# Patient Record
Sex: FEMALE | Race: WHITE | NOT HISPANIC OR LATINO | Employment: OTHER | URBAN - METROPOLITAN AREA
[De-identification: names, ages, dates, MRNs, and addresses within clinical notes are randomized per-mention and may not be internally consistent; named-entity substitution may affect disease eponyms.]

---

## 2017-09-06 ENCOUNTER — LAB (OUTPATIENT)
Dept: LAB | Facility: HOSPITAL | Age: 67
End: 2017-09-06

## 2017-09-06 ENCOUNTER — OFFICE VISIT (OUTPATIENT)
Dept: ORTHOPEDIC SURGERY | Facility: CLINIC | Age: 67
End: 2017-09-06

## 2017-09-06 VITALS — BODY MASS INDEX: 34.93 KG/M2 | WEIGHT: 244 LBS | HEIGHT: 70 IN | TEMPERATURE: 97.8 F

## 2017-09-06 DIAGNOSIS — M25.471 PAIN AND SWELLING OF ANKLE, RIGHT: ICD-10-CM

## 2017-09-06 DIAGNOSIS — T81.31XA WOUND DEHISCENCE, INITIAL ENCOUNTER: ICD-10-CM

## 2017-09-06 DIAGNOSIS — M25.571 PAIN AND SWELLING OF ANKLE, RIGHT: ICD-10-CM

## 2017-09-06 DIAGNOSIS — M25.471 PAIN AND SWELLING OF ANKLE, RIGHT: Primary | ICD-10-CM

## 2017-09-06 DIAGNOSIS — M19.079 ARTHRITIS OF ANKLE: ICD-10-CM

## 2017-09-06 DIAGNOSIS — M25.571 PAIN AND SWELLING OF ANKLE, RIGHT: Primary | ICD-10-CM

## 2017-09-06 PROBLEM — T81.30XA WOUND DEHISCENCE: Status: ACTIVE | Noted: 2017-09-06

## 2017-09-06 LAB
BASOPHILS # BLD AUTO: 0.03 10*3/MM3 (ref 0–0.2)
BASOPHILS NFR BLD AUTO: 0.4 % (ref 0–1.5)
CRP SERPL-MCNC: 0.81 MG/DL (ref 0–0.5)
DEPRECATED RDW RBC AUTO: 49.8 FL (ref 37–54)
EOSINOPHIL # BLD AUTO: 0.28 10*3/MM3 (ref 0–0.7)
EOSINOPHIL NFR BLD AUTO: 4 % (ref 0.3–6.2)
ERYTHROCYTE [DISTWIDTH] IN BLOOD BY AUTOMATED COUNT: 13.9 % (ref 11.7–13)
ERYTHROCYTE [SEDIMENTATION RATE] IN BLOOD: 20 MM/HR (ref 0–30)
HCT VFR BLD AUTO: 38.2 % (ref 35.6–45.5)
HGB BLD-MCNC: 12.2 G/DL (ref 11.9–15.5)
IMM GRANULOCYTES # BLD: 0.02 10*3/MM3 (ref 0–0.03)
IMM GRANULOCYTES NFR BLD: 0.3 % (ref 0–0.5)
LYMPHOCYTES # BLD AUTO: 1.18 10*3/MM3 (ref 0.9–4.8)
LYMPHOCYTES NFR BLD AUTO: 16.9 % (ref 19.6–45.3)
MCH RBC QN AUTO: 31 PG (ref 26.9–32)
MCHC RBC AUTO-ENTMCNC: 31.9 G/DL (ref 32.4–36.3)
MCV RBC AUTO: 97.2 FL (ref 80.5–98.2)
MONOCYTES # BLD AUTO: 0.87 10*3/MM3 (ref 0.2–1.2)
MONOCYTES NFR BLD AUTO: 12.5 % (ref 5–12)
NEUTROPHILS # BLD AUTO: 4.59 10*3/MM3 (ref 1.9–8.1)
NEUTROPHILS NFR BLD AUTO: 65.9 % (ref 42.7–76)
PLATELET # BLD AUTO: 282 10*3/MM3 (ref 140–500)
PMV BLD AUTO: 10.4 FL (ref 6–12)
RBC # BLD AUTO: 3.93 10*6/MM3 (ref 3.9–5.2)
WBC NRBC COR # BLD: 6.97 10*3/MM3 (ref 4.5–10.7)

## 2017-09-06 PROCEDURE — 86140 C-REACTIVE PROTEIN: CPT

## 2017-09-06 PROCEDURE — 73610 X-RAY EXAM OF ANKLE: CPT | Performed by: ORTHOPAEDIC SURGERY

## 2017-09-06 PROCEDURE — 85652 RBC SED RATE AUTOMATED: CPT

## 2017-09-06 PROCEDURE — 36415 COLL VENOUS BLD VENIPUNCTURE: CPT

## 2017-09-06 PROCEDURE — 99204 OFFICE O/P NEW MOD 45 MIN: CPT | Performed by: ORTHOPAEDIC SURGERY

## 2017-09-06 PROCEDURE — 85025 COMPLETE CBC W/AUTO DIFF WBC: CPT

## 2017-09-06 RX ORDER — POLYMYXIN B SULFATE AND TRIMETHOPRIM 1; 10000 MG/ML; [USP'U]/ML
SOLUTION OPHTHALMIC
COMMUNITY
Start: 2017-08-02 | End: 2022-07-11

## 2017-09-06 RX ORDER — METOPROLOL SUCCINATE 25 MG/1
TABLET, EXTENDED RELEASE ORAL
COMMUNITY
Start: 2017-06-30 | End: 2022-07-11

## 2017-09-06 RX ORDER — ESTRADIOL 1 MG/1
1 TABLET ORAL DAILY
COMMUNITY
Start: 2017-06-30

## 2017-09-06 RX ORDER — HYDROCODONE BITARTRATE AND ACETAMINOPHEN 7.5; 325 MG/1; MG/1
1 TABLET ORAL EVERY 6 HOURS PRN
COMMUNITY
Start: 2017-08-07 | End: 2022-08-22 | Stop reason: HOSPADM

## 2017-09-06 RX ORDER — SOTALOL HYDROCHLORIDE 120 MG/1
TABLET ORAL
COMMUNITY
Start: 2017-06-30 | End: 2022-07-11

## 2017-09-07 PROBLEM — M25.473 PAIN AND SWELLING OF ANKLE: Status: ACTIVE | Noted: 2017-09-07

## 2017-09-07 PROBLEM — M25.579 PAIN AND SWELLING OF ANKLE: Status: ACTIVE | Noted: 2017-09-07

## 2017-09-07 NOTE — PROGRESS NOTES
New Patient Complaint      Patient: Fe Arevalo  YOB: 1950 67 y.o. female  Medical Record Number: [id number]    Chief Complaints: My ankle hurts    History of Present Illness:     Patient is had a 5 week history of moderate to severe stabbing aching burning constant pain anterior aspect of her right ankle with swelling worse with standing sitting driving and walking and improved some with ice and rest.    This began about 5 weeks ago when she was doing a lot of camping and had some swelling to her ankle is also stepping awkwardly a lot up onto a step dorsiflexing her ankle.  She subsequently developed some right down of the scar over the anterior aspect of her right ankle.  Active that she had some mild intermittent aching pain associated with arthritis to the ankle.  She's not had any systemic illness or other wounds and has not had any fevers or chills redness or drainage to this area other than some very slight clear drops on a bandage.     She states that the ankle swells throughout the day but swelling essentially resolves overnight with elevation she's been using some very light weight compression hose.    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    HPI    Allergies: Allergies not on file    Medications:   No current outpatient prescriptions on file prior to visit.     No current facility-administered medications on file prior to visit.        Past Medical History:   Diagnosis Date   • A-fib    • Hypertension    • Joint pain    • Joint swelling    • Osteomyelitis      Past Surgical History:   Procedure Laterality Date   • FOOT SURGERY      x7   • HYSTERECTOMY  2004     Social History     Occupational History   • Not on file.     Social History Main Topics   • Smoking status: Never Smoker   • Smokeless tobacco: Not on file   • Alcohol use No   • Drug use: Not on file   •  "Sexual activity: Not on file      Social History     Social History Narrative   • No narrative on file     Family History   Problem Relation Age of Onset   • Heart disease Mother    • Lung disease Father    • Emphysema Father        Review of Systems: 14 point review of systems performed, positive pertinent findings identified in HPI. All remaining systems negative     Review of Systems      Physical Exam:   Vitals:    09/06/17 1042   Temp: 97.8 °F (36.6 °C)   Weight: 244 lb (111 kg)   Height: 70\" (177.8 cm)     Physical Exam   Constitutional: pleasant, well developed   Eyes: sclera non icteric  Hearing : adequate for exam  Cardiovascular: palpable pulses in right foot, right calf/ thigh NT without sign of DVT  Respiratoy: breathing unlabored   Neurological: grossly sensate to LT throughout right LE  Psychiatric: oriented with normal mood and affect.   Lymphatic: No palpable popliteal lymphadenopathy right LE  Skin: intact throughout right leg/foot  Musculoskeletal: Nonantalgic gait.  Right ankle shows only mild swelling to the area of her central scar anteriorly has about 1 mm area of breakdown in width over about a 15 mm length area and is very superficial and only minimally tender to palpation there is some slight induration around this but no erythema or evidence of cellulitis.  She has 5° of dorsiflexion beyond neutral and 35-40° of plantarflexion without significant discomfort or irritability to the ankle.  She is nontender over the fibula  Physical Exam  Ortho Exam    Radiology: 3 views the right ankle ordered to evaluate pain reviewed and compared with x-rays from 2012.  There is mild to moderate arthritic changes some spurring over the anterior aspect of the ankle.  I don't see any change in the fibula as far as lucencies.  There is some plantar calcaneal spurring and calcification at the insertion of the Achilles as well as some arthritic changes dorsum of the naviculocuneiform and talonavicular joints " that is unchanged    Assessment/Plan: 1.  Right ankle swelling with anterior wound breakdown.    Had a long discussion with her today that at this point is been going on for about 5 weeks and I don't see any clinical evidence of septic arthritis and I would think if this were in fact the case that would've gotten significantly and progressively worse which it has not.  I think it is due to some local swelling from activity and continued wound irritation.    I went ahead and checked some baseline labs on her CBC with differential, sedimentation rate, C-reactive protein.    She was fitted with medium weight compression hose today for daytime use and we'll pain this area twice daily with Betadine apply small bandage to it.  I've also given her the number and placed referral to [facility].    We will check her back in 2 weeks no x-rays are necessary unless she's had increased pain.  If anything worsens in the interim she'll let me know

## 2017-09-13 ENCOUNTER — APPOINTMENT (OUTPATIENT)
Dept: WOUND CARE | Facility: HOSPITAL | Age: 67
End: 2017-09-13
Attending: SURGERY

## 2022-07-11 ENCOUNTER — PRE-ADMISSION TESTING (OUTPATIENT)
Dept: PREADMISSION TESTING | Facility: HOSPITAL | Age: 72
End: 2022-07-11

## 2022-07-11 ENCOUNTER — HOSPITAL ENCOUNTER (OUTPATIENT)
Dept: GENERAL RADIOLOGY | Facility: HOSPITAL | Age: 72
Discharge: HOME | End: 2022-07-11

## 2022-07-11 VITALS
WEIGHT: 255.1 LBS | HEART RATE: 73 BPM | OXYGEN SATURATION: 100 % | BODY MASS INDEX: 37.78 KG/M2 | SYSTOLIC BLOOD PRESSURE: 144 MMHG | HEIGHT: 69 IN | TEMPERATURE: 97.7 F | DIASTOLIC BLOOD PRESSURE: 73 MMHG

## 2022-07-11 LAB
ALBUMIN SERPL-MCNC: 4 G/DL (ref 3.5–5.2)
ALBUMIN/GLOB SERPL: 1.3 G/DL
ALP SERPL-CCNC: 61 U/L (ref 39–117)
ALT SERPL W P-5'-P-CCNC: 12 U/L (ref 1–33)
ANION GAP SERPL CALCULATED.3IONS-SCNC: 7 MMOL/L (ref 5–15)
APTT PPP: 38.8 SECONDS (ref 22.7–35.4)
AST SERPL-CCNC: 12 U/L (ref 1–32)
BACTERIA UR QL AUTO: ABNORMAL /HPF
BASOPHILS # BLD AUTO: 0.04 10*3/MM3 (ref 0–0.2)
BASOPHILS NFR BLD AUTO: 0.5 % (ref 0–1.5)
BILIRUB SERPL-MCNC: 0.3 MG/DL (ref 0–1.2)
BILIRUB UR QL STRIP: NEGATIVE
BUN SERPL-MCNC: 38 MG/DL (ref 8–23)
BUN/CREAT SERPL: 33.3 (ref 7–25)
CALCIUM SPEC-SCNC: 10.5 MG/DL (ref 8.6–10.5)
CHLORIDE SERPL-SCNC: 102 MMOL/L (ref 98–107)
CLARITY UR: ABNORMAL
CO2 SERPL-SCNC: 27 MMOL/L (ref 22–29)
COLOR UR: YELLOW
CREAT SERPL-MCNC: 1.14 MG/DL (ref 0.57–1)
DEPRECATED RDW RBC AUTO: 42.2 FL (ref 37–54)
EGFRCR SERPLBLD CKD-EPI 2021: 51.9 ML/MIN/1.73
EOSINOPHIL # BLD AUTO: 0.47 10*3/MM3 (ref 0–0.4)
EOSINOPHIL NFR BLD AUTO: 6.2 % (ref 0.3–6.2)
ERYTHROCYTE [DISTWIDTH] IN BLOOD BY AUTOMATED COUNT: 12.1 % (ref 12.3–15.4)
GLOBULIN UR ELPH-MCNC: 3.1 GM/DL
GLUCOSE SERPL-MCNC: 81 MG/DL (ref 65–99)
GLUCOSE UR STRIP-MCNC: NEGATIVE MG/DL
HBA1C MFR BLD: 5.4 % (ref 4.8–5.6)
HCT VFR BLD AUTO: 33.9 % (ref 34–46.6)
HGB BLD-MCNC: 10.8 G/DL (ref 12–15.9)
HGB UR QL STRIP.AUTO: NEGATIVE
HYALINE CASTS UR QL AUTO: ABNORMAL /LPF
IMM GRANULOCYTES # BLD AUTO: 0.04 10*3/MM3 (ref 0–0.05)
IMM GRANULOCYTES NFR BLD AUTO: 0.5 % (ref 0–0.5)
INR PPP: 2.33 (ref 0.9–1.1)
KETONES UR QL STRIP: NEGATIVE
LEUKOCYTE ESTERASE UR QL STRIP.AUTO: ABNORMAL
LYMPHOCYTES # BLD AUTO: 0.83 10*3/MM3 (ref 0.7–3.1)
LYMPHOCYTES NFR BLD AUTO: 10.9 % (ref 19.6–45.3)
MCH RBC QN AUTO: 30.5 PG (ref 26.6–33)
MCHC RBC AUTO-ENTMCNC: 31.9 G/DL (ref 31.5–35.7)
MCV RBC AUTO: 95.8 FL (ref 79–97)
MONOCYTES # BLD AUTO: 0.75 10*3/MM3 (ref 0.1–0.9)
MONOCYTES NFR BLD AUTO: 9.8 % (ref 5–12)
NEUTROPHILS NFR BLD AUTO: 5.49 10*3/MM3 (ref 1.7–7)
NEUTROPHILS NFR BLD AUTO: 72.1 % (ref 42.7–76)
NITRITE UR QL STRIP: NEGATIVE
NRBC BLD AUTO-RTO: 0 /100 WBC (ref 0–0.2)
PH UR STRIP.AUTO: <=5 [PH] (ref 5–8)
PLATELET # BLD AUTO: 250 10*3/MM3 (ref 140–450)
PMV BLD AUTO: 10.2 FL (ref 6–12)
POTASSIUM SERPL-SCNC: 4.3 MMOL/L (ref 3.5–5.2)
PROT SERPL-MCNC: 7.1 G/DL (ref 6–8.5)
PROT UR QL STRIP: NEGATIVE
PROTHROMBIN TIME: 25 SECONDS (ref 11.7–14.2)
RBC # BLD AUTO: 3.54 10*6/MM3 (ref 3.77–5.28)
RBC # UR STRIP: ABNORMAL /HPF
REF LAB TEST METHOD: ABNORMAL
SODIUM SERPL-SCNC: 136 MMOL/L (ref 136–145)
SP GR UR STRIP: 1.01 (ref 1–1.03)
SQUAMOUS #/AREA URNS HPF: ABNORMAL /HPF
UROBILINOGEN UR QL STRIP: ABNORMAL
WBC # UR STRIP: ABNORMAL /HPF
WBC NRBC COR # BLD: 7.62 10*3/MM3 (ref 3.4–10.8)

## 2022-07-11 PROCEDURE — 73560 X-RAY EXAM OF KNEE 1 OR 2: CPT

## 2022-07-11 PROCEDURE — 83036 HEMOGLOBIN GLYCOSYLATED A1C: CPT

## 2022-07-11 PROCEDURE — 85730 THROMBOPLASTIN TIME PARTIAL: CPT

## 2022-07-11 PROCEDURE — 80053 COMPREHEN METABOLIC PANEL: CPT

## 2022-07-11 PROCEDURE — 85610 PROTHROMBIN TIME: CPT

## 2022-07-11 PROCEDURE — 85025 COMPLETE CBC W/AUTO DIFF WBC: CPT

## 2022-07-11 PROCEDURE — 81001 URINALYSIS AUTO W/SCOPE: CPT

## 2022-07-11 PROCEDURE — 36415 COLL VENOUS BLD VENIPUNCTURE: CPT

## 2022-07-11 RX ORDER — HYDRALAZINE HYDROCHLORIDE 25 MG/1
25 TABLET, FILM COATED ORAL 2 TIMES DAILY
COMMUNITY

## 2022-07-11 RX ORDER — MECLIZINE HCL 25MG 25 MG/1
25 TABLET, CHEWABLE ORAL DAILY
COMMUNITY
End: 2022-07-11

## 2022-07-11 RX ORDER — WARFARIN SODIUM 5 MG/1
7.5 TABLET ORAL
COMMUNITY

## 2022-07-11 RX ORDER — METOPROLOL TARTRATE 50 MG/1
50 TABLET, FILM COATED ORAL 2 TIMES DAILY
COMMUNITY

## 2022-07-11 RX ORDER — MECLIZINE HYDROCHLORIDE 25 MG/1
25 TABLET ORAL DAILY
COMMUNITY

## 2022-07-11 RX ORDER — MULTIPLE VITAMINS W/ MINERALS TAB 9MG-400MCG
1 TAB ORAL DAILY
COMMUNITY

## 2022-07-11 RX ORDER — ACETAMINOPHEN 160 MG
2000 TABLET,DISINTEGRATING ORAL DAILY
COMMUNITY

## 2022-07-11 RX ORDER — WARFARIN SODIUM 5 MG/1
5 TABLET ORAL
COMMUNITY

## 2022-07-11 RX ORDER — LISINOPRIL AND HYDROCHLOROTHIAZIDE 20; 12.5 MG/1; MG/1
1 TABLET ORAL DAILY
Status: ON HOLD | COMMUNITY
End: 2022-09-16

## 2022-07-11 ASSESSMENT — KOOS JR
KOOS JR SCORE: 22
KOOS JR SCORE: 31.307

## 2022-07-11 NOTE — DISCHARGE INSTRUCTIONS
Take the following medications the morning of surgery:  MECLIZINE, HYDRALAZINE, METORPOLOL      ARRIVE TO OUTPATIENT SURGERY CENTER 7/15/22 AT 5:15AM    If you are on prescription narcotic pain medication to control your pain you may also take that medication the morning of surgery.    General Instructions:  Do not eat solid food after midnight the night before surgery.  You may drink clear liquids day of surgery but must stop at least one hour before your hospital arrival time.  It is beneficial for you to have a clear drink that contains carbohydrates the day of surgery.  We suggest a 12 to 20 ounce bottle of Gatorade or Powerade for non-diabetic patients or a 12 to 20 ounce bottle of G2 or Powerade Zero for diabetic patients. (Pediatric patients, are not advised to drink a 12 to 20 ounce carbohydrate drink)    Clear liquids are liquids you can see through.  Nothing red in color.     Plain water                               Sports drinks  Sodas                                   Gelatin (Jell-O)  Fruit juices without pulp such as white grape juice and apple juice  Popsicles that contain no fruit or yogurt  Tea or coffee (no cream or milk added)  Gatorade / Powerade  G2 / Powerade Zero    Infants may have breast milk up to four hours before surgery.  Infants drinking formula may drink formula up to six hours before surgery.   Patients who avoid smoking, chewing tobacco and alcohol for 4 weeks prior to surgery have a reduced risk of post-operative complications.  Quit smoking as many days before surgery as you can.  Do not smoke, use chewing tobacco or drink alcohol the day of surgery.   If applicable bring your C-PAP/ BI-PAP machine.  Bring any papers given to you in the doctor’s office.  Wear clean comfortable clothes.  Do not wear contact lenses, false eyelashes or make-up.  Bring a case for your glasses.   Bring crutches or walker if applicable.  Remove all piercings.  Leave jewelry and any other valuables at  home.  Hair extensions with metal clips must be removed prior to surgery.  The Pre-Admission Testing nurse will instruct you to bring medications if unable to obtain an accurate list in Pre-Admission Testing.      Preventing a Surgical Site Infection:  For 2 to 3 days before surgery, avoid shaving with a razor because the razor can irritate skin and make it easier to develop an infection.    Any areas of open skin can increase the risk of a post-operative wound infection by allowing bacteria to enter and travel throughout the body.  Notify your surgeon if you have any skin wounds / rashes even if it is not near the expected surgical site.  The area will need assessed to determine if surgery should be delayed until it is healed.  The night prior to surgery shower using a fresh bar of anti-bacterial soap (such as Dial) and clean washcloth.  Sleep in a clean bed with clean clothing.  Do not allow pets to sleep with you.  Shower on the morning of surgery using a fresh bar of anti-bacterial soap (such as Dial) and clean washcloth.  Dry with a clean towel and dress in clean clothing.  Ask your surgeon if you will be receiving antibiotics prior to surgery.  Make sure you, your family, and all healthcare providers clean their hands with soap and water or an alcohol based hand  before caring for you or your wound.    Day of surgery:  Your arrival time is approximately two hours before your scheduled surgery time.  Upon arrival, a Pre-op nurse and Anesthesiologist will review your health history, obtain vital signs, and answer questions you may have.  The only belongings needed at this time will be a list of your home medications and if applicable your C-PAP/BI-PAP machine.  A Pre-op nurse will start an IV and you may receive medication in preparation for surgery, including something to help you relax.     Please be aware that surgery does come with discomfort.  We want to make every effort to control your discomfort  so please discuss any uncontrolled symptoms with your nurse.   Your doctor will most likely have prescribed pain medications.      If you are going home after surgery you will receive individualized written care instructions before being discharged.  A responsible adult must drive you to and from the hospital on the day of your surgery and stay with you for 24 hours.  Discharge prescriptions can be filled by the hospital pharmacy during regular pharmacy hours.  If you are having surgery late in the day/evening your prescription may be e-prescribed to your pharmacy.  Please verify your pharmacy hours or chose a 24 hour pharmacy to avoid not having access to your prescription because your pharmacy has closed for the day.    If you are staying overnight following surgery, you will be transported to your hospital room following the recovery period.  [facility] has all private rooms.    If you have any questions please call Pre-Admission Testing at [phone].  Deductibles and co-payments are collected on the day of service. Please be prepared to pay the required co-pay, deductible or deposit on the day of service as defined by your plan.    Patient Education for Self-Quarantine Process    Following your COVID testing, we strongly recommend that you wear a mask when you are with other people and practice social distancing.   Limit your activities to only required outings.  Wash your hands with soap and water frequently for at least 20 seconds.   Avoid touching your eyes, nose and mouth with unwashed hands.  Do not share anything - utensils, drinking glasses, food from the same bowl.   Sanitize household surfaces daily. Include all high touch areas (door handles, light switches, phones, countertops, etc.)    Call your surgeon immediately if you experience any of the following symptoms:  Sore Throat  Shortness of Breath or difficulty breathing  Cough  Chills  Body soreness or muscle  pain  Headache  Fever  New loss of taste or smell  Do not arrive for your surgery ill.  Your procedure will need to be rescheduled to another time.  You will need to call your physician before the day of surgery to avoid any unnecessary exposure to hospital staff as well as other patients.       CHLORHEXIDINE CLOTH INSTRUCTIONS  The morning of surgery follow these instructions using the Chlorhexidine cloths you've been given.  These steps reduce bacteria on the body.  Do not use the cloths near your eyes, ears mouth, genitalia or on open wounds.  Throw the cloths away after use but do not try to flush them down a toilet.      Open and remove one cloth at a time from the package.    Leave the cloth unfolded and begin the bathing.  Massage the skin with the cloths using gentle pressure to remove bacteria.  Do not scrub harshly.   Follow the steps below with one 2% CHG cloth per area (6 total cloths).  One cloth for neck, shoulders and chest.  One cloth for both arms, hands, fingers and underarms (do underarms last).  One cloth for the abdomen followed by groin.  One cloth for right leg and foot including between the toes.  One cloth for left leg and foot including between the toes.  The last cloth is to be used for the back of the neck, back and buttocks.    Allow the CHG to air dry 3 minutes on the skin which will give it time to work and decrease the chance of irritation.  The skin may feel sticky until it is dry.  Do not rinse with water or any other liquid or you will lose the beneficial effects of the CHG.  If mild skin irritation occurs, do rinse the skin to remove the CHG.  Report this to the nurse at time of admission.  Do not apply lotions, creams, ointments, deodorants or perfumes after using the clothes. Dress in clean clothes before coming to the hospital.    BACTROBAN NASAL OINTMENT  There are many germs normally in your nose. Bactroban is an ointment that will help reduce these germs. Please follow these  instructions for Bactroban use:      ____The day before surgery in the morning  Date________    ____The day before surgery in the evening              Date________    ____The day of surgery in the morning    Date________    **Squirt ½ package of Bactroban Ointment onto a cotton applicator and apply to inside of 1st nostril.  Squirt the remaining Bactroban and apply to the inside of the other nostril.

## 2022-07-13 ENCOUNTER — LAB (OUTPATIENT)
Dept: LAB | Facility: HOSPITAL | Age: 72
End: 2022-07-13

## 2022-07-13 LAB — SARS-COV-2 ORF1AB RESP QL NAA+PROBE: NOT DETECTED

## 2022-07-13 PROCEDURE — U0005 INFEC AGEN DETEC AMPLI PROBE: HCPCS

## 2022-07-13 PROCEDURE — C9803 HOPD COVID-19 SPEC COLLECT: HCPCS

## 2022-07-13 PROCEDURE — U0004 COV-19 TEST NON-CDC HGH THRU: HCPCS

## 2022-07-15 ENCOUNTER — ANESTHESIA (OUTPATIENT)
Dept: PERIOP | Facility: HOSPITAL | Age: 72
End: 2022-07-15

## 2022-07-15 ENCOUNTER — APPOINTMENT (OUTPATIENT)
Dept: GENERAL RADIOLOGY | Facility: HOSPITAL | Age: 72
End: 2022-07-15

## 2022-07-15 ENCOUNTER — HOSPITAL ENCOUNTER (OUTPATIENT)
Facility: HOSPITAL | Age: 72
Discharge: HOME | End: 2022-07-16
Attending: ORTHOPAEDIC SURGERY | Admitting: ORTHOPAEDIC SURGERY

## 2022-07-15 ENCOUNTER — ANESTHESIA EVENT (OUTPATIENT)
Dept: PERIOP | Facility: HOSPITAL | Age: 72
End: 2022-07-15

## 2022-07-15 PROBLEM — Z96.659 STATUS POST KNEE REPLACEMENT: Status: ACTIVE | Noted: 2022-07-15

## 2022-07-15 LAB
ANION GAP SERPL CALCULATED.3IONS-SCNC: 12 MMOL/L (ref 5–15)
BUN SERPL-MCNC: 37 MG/DL (ref 8–23)
BUN/CREAT SERPL: 22 (ref 7–25)
CALCIUM SPEC-SCNC: 9.7 MG/DL (ref 8.6–10.5)
CHLORIDE SERPL-SCNC: 102 MMOL/L (ref 98–107)
CO2 SERPL-SCNC: 18 MMOL/L (ref 22–29)
CREAT SERPL-MCNC: 1.68 MG/DL (ref 0.57–1)
DEPRECATED RDW RBC AUTO: 42 FL (ref 37–54)
EGFRCR SERPLBLD CKD-EPI 2021: 32.6 ML/MIN/1.73
ERYTHROCYTE [DISTWIDTH] IN BLOOD BY AUTOMATED COUNT: 12.1 % (ref 12.3–15.4)
GLUCOSE BLDC GLUCOMTR-MCNC: 163 MG/DL (ref 70–130)
GLUCOSE SERPL-MCNC: 182 MG/DL (ref 65–99)
HCT VFR BLD AUTO: 32.6 % (ref 34–46.6)
HGB BLD-MCNC: 10.4 G/DL (ref 12–15.9)
INR PPP: 1.18 (ref 0.9–1.1)
INR PPP: 1.23 (ref 0.9–1.1)
MCH RBC QN AUTO: 30.7 PG (ref 26.6–33)
MCHC RBC AUTO-ENTMCNC: 31.9 G/DL (ref 31.5–35.7)
MCV RBC AUTO: 96.2 FL (ref 79–97)
PLATELET # BLD AUTO: 211 10*3/MM3 (ref 140–450)
PMV BLD AUTO: 10.6 FL (ref 6–12)
POTASSIUM SERPL-SCNC: 5.2 MMOL/L (ref 3.5–5.2)
PROTHROMBIN TIME: 14.8 SECONDS (ref 11.7–14.2)
PROTHROMBIN TIME: 15.3 SECONDS (ref 11.7–14.2)
RBC # BLD AUTO: 3.39 10*6/MM3 (ref 3.77–5.28)
SODIUM SERPL-SCNC: 132 MMOL/L (ref 136–145)
WBC NRBC COR # BLD: 10.3 10*3/MM3 (ref 3.4–10.8)

## 2022-07-15 PROCEDURE — 97161 PT EVAL LOW COMPLEX 20 MIN: CPT

## 2022-07-15 PROCEDURE — C1776 JOINT DEVICE (IMPLANTABLE): HCPCS | Performed by: ORTHOPAEDIC SURGERY

## 2022-07-15 PROCEDURE — 80048 BASIC METABOLIC PNL TOTAL CA: CPT | Performed by: ORTHOPAEDIC SURGERY

## 2022-07-15 PROCEDURE — 25010000002 HYDROMORPHONE PER 4 MG: Performed by: NURSE ANESTHETIST, CERTIFIED REGISTERED

## 2022-07-15 PROCEDURE — 85027 COMPLETE CBC AUTOMATED: CPT | Performed by: ORTHOPAEDIC SURGERY

## 2022-07-15 PROCEDURE — 25010000002 FENTANYL CITRATE (PF) 50 MCG/ML SOLUTION: Performed by: NURSE ANESTHETIST, CERTIFIED REGISTERED

## 2022-07-15 PROCEDURE — 25010000002 CEFAZOLIN IN DEXTROSE 2-4 GM/100ML-% SOLUTION: Performed by: ORTHOPAEDIC SURGERY

## 2022-07-15 PROCEDURE — 97116 GAIT TRAINING THERAPY: CPT

## 2022-07-15 PROCEDURE — 25010000002 ROPIVACAINE PER 1 MG: Performed by: ORTHOPAEDIC SURGERY

## 2022-07-15 PROCEDURE — 25010000002 NEOSTIGMINE 5 MG/10ML SOLUTION: Performed by: NURSE ANESTHETIST, CERTIFIED REGISTERED

## 2022-07-15 PROCEDURE — 25010000002 PROPOFOL 10 MG/ML EMULSION: Performed by: NURSE ANESTHETIST, CERTIFIED REGISTERED

## 2022-07-15 PROCEDURE — C1713 ANCHOR/SCREW BN/BN,TIS/BN: HCPCS | Performed by: ORTHOPAEDIC SURGERY

## 2022-07-15 PROCEDURE — G0378 HOSPITAL OBSERVATION PER HR: HCPCS

## 2022-07-15 PROCEDURE — 73560 X-RAY EXAM OF KNEE 1 OR 2: CPT

## 2022-07-15 PROCEDURE — 25010000002 EPINEPHRINE 1 MG/ML SOLUTION 30 ML VIAL: Performed by: ORTHOPAEDIC SURGERY

## 2022-07-15 PROCEDURE — 85610 PROTHROMBIN TIME: CPT | Performed by: ORTHOPAEDIC SURGERY

## 2022-07-15 PROCEDURE — 25010000002 CLONIDINE PER 1 MG: Performed by: ORTHOPAEDIC SURGERY

## 2022-07-15 PROCEDURE — 25010000002 CEFAZOLIN PER 500 MG: Performed by: ORTHOPAEDIC SURGERY

## 2022-07-15 PROCEDURE — 25010000002 KETOROLAC TROMETHAMINE PER 15 MG: Performed by: ORTHOPAEDIC SURGERY

## 2022-07-15 PROCEDURE — 82962 GLUCOSE BLOOD TEST: CPT

## 2022-07-15 PROCEDURE — 25010000002 DEXAMETHASONE PER 1 MG: Performed by: NURSE ANESTHETIST, CERTIFIED REGISTERED

## 2022-07-15 DEVICE — JOURNEY 7.5 ROUND RESURF PAT 32MM STANDARD
Type: IMPLANTABLE DEVICE | Site: KNEE | Status: FUNCTIONAL
Brand: JOURNEY

## 2022-07-15 DEVICE — DEV CONTRL TISS STRATAFIX SPIRAL MNCRYL UD 3/0 PLS 30CM: Type: IMPLANTABLE DEVICE | Site: KNEE | Status: FUNCTIONAL

## 2022-07-15 DEVICE — JOURNEY II BCS XLPE ARTICULAR                                    INSERT SIZE 3-4 RIGHT 11MM
Type: IMPLANTABLE DEVICE | Site: KNEE | Status: FUNCTIONAL
Brand: JOURNEY

## 2022-07-15 DEVICE — CMT BONE PALACOS R HI/VISC 1X40: Type: IMPLANTABLE DEVICE | Site: KNEE | Status: FUNCTIONAL

## 2022-07-15 DEVICE — IMPLANTABLE DEVICE: Type: IMPLANTABLE DEVICE | Site: KNEE | Status: FUNCTIONAL

## 2022-07-15 DEVICE — JOURNEY TIBIAL BASEPLATE NONPOROUS                                    RIGHT SIZE 4
Type: IMPLANTABLE DEVICE | Site: KNEE | Status: FUNCTIONAL
Brand: JOURNEY

## 2022-07-15 DEVICE — JOURNEY II BCS FEMORAL OXINIUM                                    RIGHT SIZE 6
Type: IMPLANTABLE DEVICE | Site: KNEE | Status: FUNCTIONAL
Brand: JOURNEY

## 2022-07-15 RX ORDER — OXYCODONE AND ACETAMINOPHEN 7.5; 325 MG/1; MG/1
1 TABLET ORAL EVERY 4 HOURS PRN
Status: DISCONTINUED | OUTPATIENT
Start: 2022-07-15 | End: 2022-07-15 | Stop reason: HOSPADM

## 2022-07-15 RX ORDER — ONDANSETRON 4 MG/1
4 TABLET, FILM COATED ORAL EVERY 6 HOURS PRN
Status: DISCONTINUED | OUTPATIENT
Start: 2022-07-15 | End: 2022-07-16 | Stop reason: HOSPADM

## 2022-07-15 RX ORDER — GLYCOPYRROLATE 0.2 MG/ML
INJECTION INTRAMUSCULAR; INTRAVENOUS AS NEEDED
Status: DISCONTINUED | OUTPATIENT
Start: 2022-07-15 | End: 2022-07-15 | Stop reason: SURG

## 2022-07-15 RX ORDER — CEFAZOLIN SODIUM 2 G/100ML
2 INJECTION, SOLUTION INTRAVENOUS EVERY 8 HOURS
Status: COMPLETED | OUTPATIENT
Start: 2022-07-15 | End: 2022-07-15

## 2022-07-15 RX ORDER — HYDROCODONE BITARTRATE AND ACETAMINOPHEN 7.5; 325 MG/1; MG/1
1 TABLET ORAL ONCE AS NEEDED
Status: COMPLETED | OUTPATIENT
Start: 2022-07-15 | End: 2022-07-15

## 2022-07-15 RX ORDER — DOCUSATE SODIUM 100 MG/1
100 CAPSULE, LIQUID FILLED ORAL 2 TIMES DAILY PRN
Status: DISCONTINUED | OUTPATIENT
Start: 2022-07-15 | End: 2022-07-16 | Stop reason: HOSPADM

## 2022-07-15 RX ORDER — WARFARIN SODIUM 5 MG/1
5 TABLET ORAL
Status: DISCONTINUED | OUTPATIENT
Start: 2022-07-17 | End: 2022-07-16 | Stop reason: HOSPADM

## 2022-07-15 RX ORDER — FLUMAZENIL 0.1 MG/ML
0.2 INJECTION INTRAVENOUS AS NEEDED
Status: DISCONTINUED | OUTPATIENT
Start: 2022-07-15 | End: 2022-07-15 | Stop reason: HOSPADM

## 2022-07-15 RX ORDER — TRANEXAMIC ACID 100 MG/ML
INJECTION, SOLUTION INTRAVENOUS AS NEEDED
Status: DISCONTINUED | OUTPATIENT
Start: 2022-07-15 | End: 2022-07-15 | Stop reason: SURG

## 2022-07-15 RX ORDER — PROPOFOL 10 MG/ML
VIAL (ML) INTRAVENOUS AS NEEDED
Status: DISCONTINUED | OUTPATIENT
Start: 2022-07-15 | End: 2022-07-15 | Stop reason: SURG

## 2022-07-15 RX ORDER — CEFAZOLIN SODIUM 2 G/100ML
2 INJECTION, SOLUTION INTRAVENOUS ONCE
Status: COMPLETED | OUTPATIENT
Start: 2022-07-15 | End: 2022-07-15

## 2022-07-15 RX ORDER — ENOXAPARIN SODIUM 100 MG/ML
30 INJECTION SUBCUTANEOUS EVERY 24 HOURS
Status: DISCONTINUED | OUTPATIENT
Start: 2022-07-16 | End: 2022-07-16 | Stop reason: HOSPADM

## 2022-07-15 RX ORDER — UREA 10 %
1 LOTION (ML) TOPICAL NIGHTLY PRN
Status: DISCONTINUED | OUTPATIENT
Start: 2022-07-15 | End: 2022-07-16 | Stop reason: HOSPADM

## 2022-07-15 RX ORDER — FENTANYL CITRATE 50 UG/ML
INJECTION, SOLUTION INTRAMUSCULAR; INTRAVENOUS AS NEEDED
Status: DISCONTINUED | OUTPATIENT
Start: 2022-07-15 | End: 2022-07-15 | Stop reason: SURG

## 2022-07-15 RX ORDER — PROMETHAZINE HYDROCHLORIDE 25 MG/1
25 SUPPOSITORY RECTAL ONCE AS NEEDED
Status: DISCONTINUED | OUTPATIENT
Start: 2022-07-15 | End: 2022-07-15 | Stop reason: HOSPADM

## 2022-07-15 RX ORDER — ONDANSETRON 2 MG/ML
4 INJECTION INTRAMUSCULAR; INTRAVENOUS EVERY 6 HOURS PRN
Status: DISCONTINUED | OUTPATIENT
Start: 2022-07-15 | End: 2022-07-16 | Stop reason: HOSPADM

## 2022-07-15 RX ORDER — SODIUM CHLORIDE 9 MG/ML
100 INJECTION, SOLUTION INTRAVENOUS CONTINUOUS
Status: DISCONTINUED | OUTPATIENT
Start: 2022-07-15 | End: 2022-07-16 | Stop reason: HOSPADM

## 2022-07-15 RX ORDER — ONDANSETRON 2 MG/ML
4 INJECTION INTRAMUSCULAR; INTRAVENOUS ONCE AS NEEDED
Status: DISCONTINUED | OUTPATIENT
Start: 2022-07-15 | End: 2022-07-15 | Stop reason: HOSPADM

## 2022-07-15 RX ORDER — LIDOCAINE HYDROCHLORIDE 20 MG/ML
INJECTION, SOLUTION INFILTRATION; PERINEURAL AS NEEDED
Status: DISCONTINUED | OUTPATIENT
Start: 2022-07-15 | End: 2022-07-15 | Stop reason: SURG

## 2022-07-15 RX ORDER — SODIUM CHLORIDE 0.9 % (FLUSH) 0.9 %
3 SYRINGE (ML) INJECTION EVERY 12 HOURS SCHEDULED
Status: DISCONTINUED | OUTPATIENT
Start: 2022-07-15 | End: 2022-07-15 | Stop reason: HOSPADM

## 2022-07-15 RX ORDER — WARFARIN SODIUM 7.5 MG/1
7.5 TABLET ORAL
Status: DISCONTINUED | OUTPATIENT
Start: 2022-07-16 | End: 2022-07-16 | Stop reason: HOSPADM

## 2022-07-15 RX ORDER — EPHEDRINE SULFATE 50 MG/ML
5 INJECTION, SOLUTION INTRAVENOUS ONCE AS NEEDED
Status: DISCONTINUED | OUTPATIENT
Start: 2022-07-15 | End: 2022-07-15 | Stop reason: HOSPADM

## 2022-07-15 RX ORDER — DIPHENHYDRAMINE HYDROCHLORIDE 50 MG/ML
12.5 INJECTION INTRAMUSCULAR; INTRAVENOUS
Status: DISCONTINUED | OUTPATIENT
Start: 2022-07-15 | End: 2022-07-15 | Stop reason: HOSPADM

## 2022-07-15 RX ORDER — DIPHENHYDRAMINE HCL 25 MG
25 CAPSULE ORAL
Status: DISCONTINUED | OUTPATIENT
Start: 2022-07-15 | End: 2022-07-15 | Stop reason: HOSPADM

## 2022-07-15 RX ORDER — OXYCODONE HYDROCHLORIDE 5 MG/1
5 TABLET ORAL
Status: ACTIVE | OUTPATIENT
Start: 2022-07-15 | End: 2022-07-16

## 2022-07-15 RX ORDER — FAMOTIDINE 20 MG/1
40 TABLET, FILM COATED ORAL DAILY
Status: DISCONTINUED | OUTPATIENT
Start: 2022-07-15 | End: 2022-07-16 | Stop reason: HOSPADM

## 2022-07-15 RX ORDER — HYDRALAZINE HYDROCHLORIDE 20 MG/ML
5 INJECTION INTRAMUSCULAR; INTRAVENOUS
Status: DISCONTINUED | OUTPATIENT
Start: 2022-07-15 | End: 2022-07-15 | Stop reason: HOSPADM

## 2022-07-15 RX ORDER — LABETALOL HYDROCHLORIDE 5 MG/ML
5 INJECTION, SOLUTION INTRAVENOUS
Status: DISCONTINUED | OUTPATIENT
Start: 2022-07-15 | End: 2022-07-15 | Stop reason: HOSPADM

## 2022-07-15 RX ORDER — NALOXONE HCL 0.4 MG/ML
0.2 VIAL (ML) INJECTION AS NEEDED
Status: DISCONTINUED | OUTPATIENT
Start: 2022-07-15 | End: 2022-07-15 | Stop reason: HOSPADM

## 2022-07-15 RX ORDER — CELECOXIB 200 MG/1
200 CAPSULE ORAL ONCE
Status: COMPLETED | OUTPATIENT
Start: 2022-07-15 | End: 2022-07-15

## 2022-07-15 RX ORDER — FAMOTIDINE 10 MG/ML
20 INJECTION, SOLUTION INTRAVENOUS ONCE
Status: COMPLETED | OUTPATIENT
Start: 2022-07-15 | End: 2022-07-15

## 2022-07-15 RX ORDER — DIPHENHYDRAMINE HCL 25 MG
50 CAPSULE ORAL EVERY 6 HOURS PRN
Status: DISCONTINUED | OUTPATIENT
Start: 2022-07-15 | End: 2022-07-16 | Stop reason: HOSPADM

## 2022-07-15 RX ORDER — DEXAMETHASONE SODIUM PHOSPHATE 4 MG/ML
INJECTION, SOLUTION INTRA-ARTICULAR; INTRALESIONAL; INTRAMUSCULAR; INTRAVENOUS; SOFT TISSUE AS NEEDED
Status: DISCONTINUED | OUTPATIENT
Start: 2022-07-15 | End: 2022-07-15 | Stop reason: SURG

## 2022-07-15 RX ORDER — PROMETHAZINE HYDROCHLORIDE 25 MG/1
25 TABLET ORAL ONCE AS NEEDED
Status: DISCONTINUED | OUTPATIENT
Start: 2022-07-15 | End: 2022-07-15 | Stop reason: HOSPADM

## 2022-07-15 RX ORDER — WARFARIN SODIUM 10 MG/1
10 TABLET ORAL
Status: COMPLETED | OUTPATIENT
Start: 2022-07-15 | End: 2022-07-15

## 2022-07-15 RX ORDER — DIPHENHYDRAMINE HYDROCHLORIDE 50 MG/ML
25 INJECTION INTRAMUSCULAR; INTRAVENOUS EVERY 6 HOURS PRN
Status: DISCONTINUED | OUTPATIENT
Start: 2022-07-15 | End: 2022-07-16 | Stop reason: HOSPADM

## 2022-07-15 RX ORDER — NALOXONE HCL 0.4 MG/ML
0.1 VIAL (ML) INJECTION
Status: DISCONTINUED | OUTPATIENT
Start: 2022-07-15 | End: 2022-07-16 | Stop reason: HOSPADM

## 2022-07-15 RX ORDER — NEOSTIGMINE METHYLSULFATE 0.5 MG/ML
INJECTION, SOLUTION INTRAVENOUS AS NEEDED
Status: DISCONTINUED | OUTPATIENT
Start: 2022-07-15 | End: 2022-07-15 | Stop reason: SURG

## 2022-07-15 RX ORDER — OXYCODONE HYDROCHLORIDE AND ACETAMINOPHEN 5; 325 MG/1; MG/1
1 TABLET ORAL EVERY 4 HOURS PRN
Status: DISCONTINUED | OUTPATIENT
Start: 2022-07-15 | End: 2022-07-16 | Stop reason: HOSPADM

## 2022-07-15 RX ORDER — SODIUM CHLORIDE 0.9 % (FLUSH) 0.9 %
3-10 SYRINGE (ML) INJECTION AS NEEDED
Status: DISCONTINUED | OUTPATIENT
Start: 2022-07-15 | End: 2022-07-15 | Stop reason: HOSPADM

## 2022-07-15 RX ORDER — MAGNESIUM HYDROXIDE 1200 MG/15ML
LIQUID ORAL AS NEEDED
Status: DISCONTINUED | OUTPATIENT
Start: 2022-07-15 | End: 2022-07-15 | Stop reason: HOSPADM

## 2022-07-15 RX ORDER — FENTANYL CITRATE 50 UG/ML
50 INJECTION, SOLUTION INTRAMUSCULAR; INTRAVENOUS
Status: DISCONTINUED | OUTPATIENT
Start: 2022-07-15 | End: 2022-07-15 | Stop reason: HOSPADM

## 2022-07-15 RX ORDER — MIDAZOLAM HYDROCHLORIDE 1 MG/ML
0.5 INJECTION INTRAMUSCULAR; INTRAVENOUS
Status: DISCONTINUED | OUTPATIENT
Start: 2022-07-15 | End: 2022-07-15 | Stop reason: HOSPADM

## 2022-07-15 RX ORDER — ACETAMINOPHEN 500 MG
1000 TABLET ORAL ONCE
Status: COMPLETED | OUTPATIENT
Start: 2022-07-15 | End: 2022-07-15

## 2022-07-15 RX ORDER — ROCURONIUM BROMIDE 10 MG/ML
INJECTION, SOLUTION INTRAVENOUS AS NEEDED
Status: DISCONTINUED | OUTPATIENT
Start: 2022-07-15 | End: 2022-07-15 | Stop reason: SURG

## 2022-07-15 RX ORDER — OXYCODONE HYDROCHLORIDE AND ACETAMINOPHEN 5; 325 MG/1; MG/1
2 TABLET ORAL EVERY 4 HOURS PRN
Status: DISCONTINUED | OUTPATIENT
Start: 2022-07-15 | End: 2022-07-16 | Stop reason: HOSPADM

## 2022-07-15 RX ORDER — SODIUM CHLORIDE, SODIUM LACTATE, POTASSIUM CHLORIDE, CALCIUM CHLORIDE 600; 310; 30; 20 MG/100ML; MG/100ML; MG/100ML; MG/100ML
9 INJECTION, SOLUTION INTRAVENOUS CONTINUOUS
Status: DISCONTINUED | OUTPATIENT
Start: 2022-07-15 | End: 2022-07-16 | Stop reason: HOSPADM

## 2022-07-15 RX ORDER — HYDRALAZINE HYDROCHLORIDE 25 MG/1
25 TABLET, FILM COATED ORAL 2 TIMES DAILY
Status: DISCONTINUED | OUTPATIENT
Start: 2022-07-15 | End: 2022-07-16 | Stop reason: HOSPADM

## 2022-07-15 RX ORDER — HYDROMORPHONE HYDROCHLORIDE 1 MG/ML
0.5 INJECTION, SOLUTION INTRAMUSCULAR; INTRAVENOUS; SUBCUTANEOUS
Status: DISCONTINUED | OUTPATIENT
Start: 2022-07-15 | End: 2022-07-15 | Stop reason: HOSPADM

## 2022-07-15 RX ORDER — METOPROLOL TARTRATE 50 MG/1
50 TABLET, FILM COATED ORAL 2 TIMES DAILY
Status: DISCONTINUED | OUTPATIENT
Start: 2022-07-15 | End: 2022-07-16 | Stop reason: HOSPADM

## 2022-07-15 RX ADMIN — CEFAZOLIN 2 G: 10 INJECTION, POWDER, FOR SOLUTION INTRAVENOUS at 07:02

## 2022-07-15 RX ADMIN — WARFARIN 10 MG: 10 TABLET ORAL at 17:54

## 2022-07-15 RX ADMIN — HYDROMORPHONE HYDROCHLORIDE 0.5 MG: 1 INJECTION, SOLUTION INTRAMUSCULAR; INTRAVENOUS; SUBCUTANEOUS at 11:18

## 2022-07-15 RX ADMIN — CEFAZOLIN SODIUM 2 G: 2 INJECTION, SOLUTION INTRAVENOUS at 16:10

## 2022-07-15 RX ADMIN — HYDROCODONE BITARTRATE AND ACETAMINOPHEN 1 TABLET: 7.5; 325 TABLET ORAL at 08:59

## 2022-07-15 RX ADMIN — CEFAZOLIN SODIUM 2 G: 2 INJECTION, SOLUTION INTRAVENOUS at 22:28

## 2022-07-15 RX ADMIN — FENTANYL CITRATE 50 MCG: 0.05 INJECTION, SOLUTION INTRAMUSCULAR; INTRAVENOUS at 07:12

## 2022-07-15 RX ADMIN — DEXAMETHASONE SODIUM PHOSPHATE 8 MG: 4 INJECTION, SOLUTION INTRAMUSCULAR; INTRAVENOUS at 07:23

## 2022-07-15 RX ADMIN — OXYCODONE AND ACETAMINOPHEN 2 TABLET: 5; 325 TABLET ORAL at 19:51

## 2022-07-15 RX ADMIN — METOPROLOL TARTRATE 50 MG: 50 TABLET, FILM COATED ORAL at 22:27

## 2022-07-15 RX ADMIN — FENTANYL CITRATE 50 MCG: 50 INJECTION INTRAMUSCULAR; INTRAVENOUS at 08:59

## 2022-07-15 RX ADMIN — FAMOTIDINE 20 MG: 10 INJECTION, SOLUTION INTRAVENOUS at 06:24

## 2022-07-15 RX ADMIN — ACETAMINOPHEN 1000 MG: 500 TABLET ORAL at 06:24

## 2022-07-15 RX ADMIN — PROPOFOL 200 MG: 10 INJECTION, EMULSION INTRAVENOUS at 07:12

## 2022-07-15 RX ADMIN — SODIUM CHLORIDE, POTASSIUM CHLORIDE, SODIUM LACTATE AND CALCIUM CHLORIDE 9 ML/HR: 600; 310; 30; 20 INJECTION, SOLUTION INTRAVENOUS at 06:09

## 2022-07-15 RX ADMIN — GLYCOPYRROLATE 0.2 MG: 0.2 INJECTION INTRAMUSCULAR; INTRAVENOUS at 08:22

## 2022-07-15 RX ADMIN — NEOSTIGMINE METHYLSULFATE 4 MG: 0.5 INJECTION INTRAVENOUS at 08:24

## 2022-07-15 RX ADMIN — TRANEXAMIC ACID 1000 MG: 1 INJECTION, SOLUTION INTRAVENOUS at 07:23

## 2022-07-15 RX ADMIN — OXYCODONE AND ACETAMINOPHEN 2 TABLET: 5; 325 TABLET ORAL at 14:39

## 2022-07-15 RX ADMIN — CELECOXIB 200 MG: 200 CAPSULE ORAL at 06:24

## 2022-07-15 RX ADMIN — PROPOFOL 25 MCG/KG/MIN: 10 INJECTION, EMULSION INTRAVENOUS at 07:12

## 2022-07-15 RX ADMIN — LISINOPRIL: 20 TABLET ORAL at 16:11

## 2022-07-15 RX ADMIN — ROCURONIUM BROMIDE 50 MG: 50 INJECTION INTRAVENOUS at 07:12

## 2022-07-15 RX ADMIN — FENTANYL CITRATE 50 MCG: 0.05 INJECTION, SOLUTION INTRAMUSCULAR; INTRAVENOUS at 07:32

## 2022-07-15 RX ADMIN — HYDRALAZINE HYDROCHLORIDE 25 MG: 25 TABLET, FILM COATED ORAL at 22:27

## 2022-07-15 RX ADMIN — LIDOCAINE HYDROCHLORIDE 80 MG: 20 INJECTION, SOLUTION INFILTRATION; PERINEURAL at 07:12

## 2022-07-15 RX ADMIN — GLYCOPYRROLATE 0.4 MG: 0.2 INJECTION INTRAMUSCULAR; INTRAVENOUS at 08:24

## 2022-07-15 NOTE — PROGRESS NOTES
[facility] Clinical Pharmacy Services: Warfarin Dosing/Monitoring Consult    Fe Arevalo is a 72 y.o. female, estimated creatinine clearance is 62.4 mL/min (A) (by C-G formula based on SCr of 1.14 mg/dL (H)). weighing  .    Results from last 7 days   Lab Units 07/15/22  0559 07/11/22  0741   INR  1.23* 2.33*   APTT seconds  --  38.8*   HEMOGLOBIN g/dL  --  10.8*   HEMATOCRIT %  --  33.9*   PLATELETS 10*3/mm3  --  250     Prior to admission anticoagulation: per patient warfarin 5 mg on W/Sun and warfarin 7.5 mg all other days     Hospital Anticoagulation:  Consulting provider: Dr. Hammonds   Start date: PTA medication; resumed 7/15/22  Indication: Atrial fibrillation   Target INR: 2 - 3  Expected duration: Indefinite    Bridge Therapy: No      Potential food or drug interactions:     Education complete?/Date: No; plan for follow up TBD    Assessment/Plan:  INR subtherapeutic at 1.23 (Goal 2-3) after holding since 7/10 for planned orthopedic surgical intervention. Based on normal home regimen and duration of hold, I will plan for warfarin 10 mg x 1 this evening with likely resumption of home regimen tomorrow.   Monitor for any signs or symptoms of bleeding  Follow up daily INRs and dose adjustments    Pharmacy will continue to follow until discharge or discontinuation of warfarin.     Evette Barillas [facility]  Clinical Pharmacist

## 2022-07-15 NOTE — ANESTHESIA PROCEDURE NOTES
Airway  Urgency: elective    Date/Time: 7/15/2022 7:15 AM  Airway not difficult    General Information and Staff    Patient location during procedure: OR  CRNA/CAA: Connie Trujillo CRNA    Indications and Patient Condition  Indications for airway management: airway protection    Preoxygenated: yes  Mask difficulty assessment: 2 - vent by mask + OA or adjuvant +/- NMBA    Final Airway Details  Final airway type: endotracheal airway      Successful airway: ETT  Cuffed: yes   Successful intubation technique: direct laryngoscopy  Endotracheal tube insertion site: oral  Blade: Vaz  Blade size: 2  ETT size (mm): 7.0  Cormack-Lehane Classification: grade I - full view of glottis  Placement verified by: chest auscultation and capnometry   Cuff volume (mL): 8  Number of attempts at approach: 1  Assessment: lips, teeth, and gum same as pre-op and atraumatic intubation    Additional Comments  PreO2 with 100% O2;  FeO2 >85%;  sniff position; easy mask ventilation;  Intubated with no difficultly after eyes taped; Appears atraumatic;  Lips and teeth intact as preop condition;  Airway secured. Connected to ventilator.

## 2022-07-15 NOTE — THERAPY EVALUATION
Patient Name: Fe Arevalo  : 1950    MRN: [id number]                              Today's Date: 7/15/2022       Admit Date: 7/15/2022    Visit Dx: No diagnosis found.  Patient Active Problem List   Diagnosis   • Arthritis of ankle   • Wound dehiscence   • Pain and swelling of ankle   • Status post knee replacement     Past Medical History:   Diagnosis Date   • A-fib (HCC)    • Histoplasmosis     STATES BILAT EYES:  INJECTIONS INTO HER EYES BILAT EVERY 7 WEEKS.  DENIES VISION LOSS   • History of blood clots 2021    PT STATES WAS DIAGNOSED WITH BLOOD CLOT BEHIND HER HEART AUG 2021-PT STATES THIS IS RESOLVED AND HAD CARDIAC ABLATION 2022   • Hypertension    • Lab test positive for detection of COVID-19 virus 2022   • Osteoarthritis of right knee    • Osteomyelitis (HCC) 2006    MRSA RIGHT FOOT   • PONV (postoperative nausea and vomiting)    • Right knee pain     WEAKNESS AND LIMITED MOBILITY     Past Surgical History:   Procedure Laterality Date   • CARDIAC ABLATION  04/29/2022    X2.  PT HAS ATRICLIP DANISHA EXCLUSION SYSTEM IN PLACE THAT PT STATES IS NOT WORKING   • CHOLECYSTECTOMY     • COLONOSCOPY     • FOOT SURGERY Right     x7 DT OSTEOMYELITIS   • HYSTERECTOMY     •  SHUNT INSERTION        General Information     Row Name 07/15/22 1451          Physical Therapy Time and Intention    Document Type evaluation  -     Mode of Treatment individual therapy;physical therapy  -     Row Name 07/15/22 1451          General Information    Patient Profile Reviewed yes  -     Prior Level of Function independent:;gait;transfer;bed mobility  -     Existing Precautions/Restrictions fall  -     Barriers to Rehab none identified  -     Row Name 07/15/22 1451          Living Environment    People in Home spouse  -     Row Name 07/15/22 1451          Home Main Entrance    Number of Stairs, Main Entrance one  -     Row Name 07/15/22 1451          Stairs Within Home, Primary    Number of  Stairs, Within Home, Primary none  -     Row Name 07/15/22 1451          Cognition    Orientation Status (Cognition) oriented x 4  -     Row Name 07/15/22 1451          Safety Issues, Functional Mobility    Impairments Affecting Function (Mobility) endurance/activity tolerance;strength;pain;range of motion (ROM)  -           User Key  (r) = Recorded By, (t) = Taken By, (c) = Cosigned By    Initials Name Provider Type     Yvette May, PT Physical Therapist               Mobility     Row Name 07/15/22 1452          Bed Mobility    Bed Mobility supine-sit  -     Supine-Sit [place] (Bed Mobility) modified independence;verbal cues  -     Assistive Device (Bed Mobility) bed rails;head of bed elevated  -     Row Name 07/15/22 1452          Sit-Stand Transfer    Sit-Stand [place] (Transfers) minimum assist (75% patient effort);2 person assist;verbal cues  -     Assistive Device (Sit-Stand Transfers) walker, front-wheeled  -     Comment, (Sit-Stand Transfer) Bed raised as pt reports her bed at home is higher, still required min A x2 and cues to push up from bed  -[facility] Name 07/15/22 1452          Gait/Stairs (Locomotion)    [place] Level (Gait) verbal cues;contact guard;1 person to manage equipment  -     Assistive Device (Gait) walker, front-wheeled  -     Distance in Feet (Gait) 25ft  [facility]     Deviations/Abnormal Patterns (Gait) antalgic;monica decreased;gait speed decreased;stride length decreased;weight shifting decreased  -     Bilateral Gait Deviations forward flexed posture;heel strike decreased  -     [place] Level (Stairs) not tested  -     Comment, (Gait/Stairs) Tolerated gait well, distance limited 2/2 fatigue and increased pain. Noted forward flexed posture and short step length with cues for sequencing and taking a larger step with LLE  -[facility] Name 07/15/22 1452          Mobility    Extremity Weight-bearing Status right lower extremity  -     Right  Lower Extremity (Weight-bearing Status) weight-bearing as tolerated (WBAT)  -           User Key  (r) = Recorded By, (t) = Taken By, (c) = Cosigned By    Initials Name Provider Type     Yvette May PT Physical Therapist               Obj/Interventions     Row Name 07/15/22 1456          Range of Motion Comprehensive    General Range of Motion lower extremity range of motion deficits identified  -     Comment, General Range of Motion Expected post-op ROM deficits  -BH     Row Name 07/15/22 1456          Strength Comprehensive (MMT)    General Manual Muscle Testing (MMT) Assessment lower extremity strength deficits identified  -     Comment, General Manual Muscle Testing (MMT) Assessment Expected post-op strength deficits, BLE  -BH     Row Name 07/15/22 1456          Motor Skills    Therapeutic Exercise --  10 reps TKA protocol  -BH     Row Name 07/15/22 1456          Balance    Balance Assessment sitting static balance;sitting dynamic balance;standing static balance;standing dynamic balance  -     Static Sitting Balance modified independence  -     Dynamic Sitting Balance modified independence  -     Position, Sitting Balance unsupported;sitting edge of bed  -     Static Standing Balance standby assist  -     Dynamic Standing Balance contact guard  -     Position/Device Used, Standing Balance walker, front-wheeled  -BH     Row Name 07/15/22 1456          Sensory Assessment (Somatosensory)    Sensory Assessment (Somatosensory) LE sensation intact  -           User Key  (r) = Recorded By, (t) = Taken By, (c) = Cosigned By    Initials Name Provider Type     Yvette May PT Physical Therapist               Goals/Plan     Row Name 07/15/22 1501          Bed Mobility Goal 1 (PT)    Activity/Assistive Device (Bed Mobility Goal 1, PT) bed mobility activities, all  -     [place] Level/Cues Needed (Bed Mobility Goal 1, PT) independent  -     Time Frame (Bed Mobility Goal 1, PT) 3  days  -[facility] Name 07/15/22 1501          Transfer Goal 1 (PT)    Activity/Assistive Device (Transfer Goal 1, PT) transfers, all  -     [place] Level/Cues Needed (Transfer Goal 1, PT) supervision required  -     Time Frame (Transfer Goal 1, PT) 3 days  -[facility] Name 07/15/22 1501          Gait Training Goal 1 (PT)    Activity/Assistive Device (Gait Training Goal 1, PT) gait (walking locomotion)  -     [place] Level (Gait Training Goal 1, PT) modified independence  -     Distance (Gait Training Goal 1, PT) 120ft  -     Time Frame (Gait Training Goal 1, PT) 3 days  -[facility] Name 07/15/22 1501          Stairs Goal 1 (PT)    Activity/Assistive Device (Stairs Goal 1, PT) stairs, all skills  -     [place] Level/Cues Needed (Stairs Goal 1, PT) standby assist  -     Number of Stairs (Stairs Goal 1, PT) 1  -     Time Frame (Stairs Goal 1, PT) 3 days  -[facility] Name 07/15/22 1501          Therapy Assessment/Plan (PT)    Planned Therapy Interventions (PT) balance training;bed mobility training;gait training;home exercise program;patient/family education;strengthening;stair training;ROM (range of motion);transfer training  -           User Key  (r) = Recorded By, (t) = Taken By, (c) = Cosigned By    Initials Name Provider Type     Yvette May, PT Physical Therapist               Clinical Impression     [facility] Name 07/15/22 2483          Pain    Pretreatment Pain Rating 7/10  -     Posttreatment Pain Rating 9/10  -     Pain Location - Side/Orientation Right  -     Pain Location incisional  -     Pain Location - knee  -     Pain Intervention(s) Repositioned;Cold applied;Ambulation/increased activity;Nursing Notified;Medication (See MAR)  -[facility] Name 07/15/22 9729          Plan of Care Review    Plan of Care Reviewed With patient;son  -     Outcome Evaluation Pt is a 71 yo F POD 0 R TKA. Pt lives with her  and reports independence at BL with no AD, but has a  rwx. Pt has 1 LORI with no steps inside and denies any recent falls. Pt presents to PT with impaired strength, endurance, and pain limiting overall mobility. Pt tolerated TKA exercises well, and transferred to EOB with mod I. Pt stood with min A x2 - bed raise to mimic bed at home and cues to push up from bed instead of pull on rwx. Pt ambulated 25ft in room with rwx and CGA - cues for upright posture and increasing step length on LLE as well as sequencing with movement of rwx. Pt agreeable to sitting UIC and left with all needs met - fresh ice and nsg present with pain meds. Pt will continue to benefit from skilled PT to address functional deficits and improve overall mobility. Anticipate D/C home with HHPT.  -     Row Name 07/15/22 1454          Therapy Assessment/Plan (PT)    Patient/Family Therapy Goals Statement (PT) Return to OF  -     Rehab Potential (PT) good, to achieve stated therapy goals  -     Criteria for Skilled Interventions Met (PT) yes  -     Therapy Frequency (PT) daily  -     Row Name 07/15/22 1455          Vital Signs    O2 Delivery Pre Treatment room air  -     O2 Delivery Intra Treatment room air  -     O2 Delivery Post Treatment room air  -     Row Name 07/15/22 1450          Positioning and Restraints    Pre-Treatment Position in bed  -     Post Treatment Position chair  -     In Chair reclined;call light within reach;encouraged to call for assist;exit alarm on;with family/caregiver;with nsg  -           User Key  (r) = Recorded By, (t) = Taken By, (c) = Cosigned By    Initials Name Provider Type     Yvette May, PT Physical Therapist               Outcome Measures     Row Name 07/15/22 8732          How much help from another person do you currently need...    Turning from your back to your side while in flat bed without using bedrails? 3  -BH     Moving from lying on back to sitting on the side of a flat bed without bedrails? 3  -BH     Moving to and from a  bed to a chair (including a wheelchair)? 3  -     Standing up from a chair using your arms (e.g., wheelchair, bedside chair)? 3  -     Climbing 3-5 steps with a railing? 3  -     To walk in hospital room? 3  -     AM-PAC 6 Clicks Score (PT) 18  -     Highest level of mobility 6 --> Walked 10 steps or more  -     Row Name 07/15/22 1501          Functional Assessment    Outcome Measure Options AM-PAC 6 Clicks Basic Mobility (PT)  -           User Key  (r) = Recorded By, (t) = Taken By, (c) = Cosigned By    Initials Name Provider Type     Yvette May PT Physical Therapist                             Physical Therapy Education                 Title: PT OT SLP Therapies (Done)     Topic: Physical Therapy (Done)     Point: Mobility training (Done)     Learning Progress Summary           Patient Acceptance, E,TB,D, VU,NR by  at 7/15/2022 1501                   Point: Home exercise program (Done)     Learning Progress Summary           Patient Acceptance, E,TB,D, VU,NR by  at 7/15/2022 1501                   Point: Body mechanics (Done)     Learning Progress Summary           Patient Acceptance, E,TB,D, VU,NR by  at 7/15/2022 1501                   Point: Precautions (Done)     Learning Progress Summary           Patient Acceptance, E,TB,D, VU,NR by  at 7/15/2022 1501                               User Key     Initials Effective Dates Name Provider Type [facility] 04/08/22 -  Yvette May PT Physical Therapist PT              PT Recommendation and Plan  Planned Therapy Interventions (PT): balance training, bed mobility training, gait training, home exercise program, patient/family education, strengthening, stair training, ROM (range of motion), transfer training  Plan of Care Reviewed With: patient, son  Outcome Evaluation: Pt is a 71 yo F POD 0 R TKA. Pt lives with her  and reports independence at BL with no AD, but has a rwx. Pt has 1 LORI with no steps inside and denies any  recent falls. Pt presents to PT with impaired strength, endurance, and pain limiting overall mobility. Pt tolerated TKA exercises well, and transferred to EOB with mod I. Pt stood with min A x2 - bed raise to mimic bed at home and cues to push up from bed instead of pull on rwx. Pt ambulated 25ft in room with rwx and CGA - cues for upright posture and increasing step length on LLE as well as sequencing with movement of rwx. Pt agreeable to sitting UIC and left with all needs met - fresh ice and nsg present with pain meds. Pt will continue to benefit from skilled PT to address functional deficits and improve overall mobility. Anticipate D/C home with HHPT.     Time Calculation:    PT Charges     Row Name 07/15/22 1502             Time Calculation    Start Time 1425  -      Stop Time 1444  -      Time Calculation (min) 19 min  -BH      PT Received On 07/15/22  -      PT - Next Appointment 07/16/22  -      PT Goal Re-Cert Due Date 07/18/22  -              Time Calculation- PT    Total Timed Code Minutes- PT 15 minute(s)  -              Timed Charges    94475 - PT Therapeutic Exercise Minutes 5  -      77176 - Gait Training Minutes  8  -BH      80145 - PT Therapeutic Activity Minutes 2  -BH              Untimed Charges    PT Eval/Re-eval Minutes 5  -              Total Minutes    Timed Charges Total Minutes 15  -BH      Untimed Charges Total Minutes 5  -BH       Total Minutes 20  -BH            User Key  (r) = Recorded By, (t) = Taken By, (c) = Cosigned By    Initials Name Provider Type     Yvette May, PT Physical Therapist              Therapy Charges for Today     Code Description Service Date Service Provider Modifiers Qty    09143078189 HC GAIT TRAINING EA 15 MIN 7/15/2022 Yvette May, PT GP 1    26507397560 HC PT EVAL LOW COMPLEXITY 3 7/15/2022 Yvette May, PT GP 1    69394474685 HC PT THER SUPP EA 15 MIN 7/15/2022 Yvette May, PT GP 1          PT G-Codes  Outcome Measure  Options: AM-PAC 6 Clicks Basic Mobility (PT)  -[facility] 6 Clicks Score (PT): 18    Yvette May, PT  7/15/2022

## 2022-07-15 NOTE — CASE MANAGEMENT/SOCIAL WORK
Discharge Planning Assessment  [facility]     Patient Name: Fe Arevalo  MRN: [id number]  Today's Date: 7/15/2022    Admit Date: 7/15/2022     Discharge Needs Assessment    No documentation.                Discharge Plan     Row Name 07/15/22 6231       Plan    Plan Home with family support & [facility].    Patient/Family in Agreement with Plan yes    Plan Comments Spoke with the patient, verified current information and explained the role of the CCP. Patient lives with her /Jensen and has family support. She plans to d/c home with family support & HH. Careplan received from [facility] which plans for [facility]. Discussed with the pt who's agreeable. Referral sent in [place]. Patient said her family will transport her home at d/c. No other needs identified. CCP will follow.              Continued Care and Services - Admitted Since 7/15/2022     Home Medical Care     Service Provider Request Status Selected Services Address Phone Fax Patient Preferred    [facility]  Pending - Request Sent N/A [address] [facility] [address] [place] [address] [phone] --                Loulou SHELL, RN

## 2022-07-15 NOTE — PLAN OF CARE
Goal Outcome Evaluation:  Plan of Care Reviewed With: patient, son           Outcome Evaluation: Pt is a 71 yo F POD 0 R TKA. Pt lives with her  and reports independence at BL with no AD, but has a rwx. Pt has 1 LORI with no steps inside and denies any recent falls. Pt presents to PT with impaired strength, endurance, and pain limiting overall mobility. Pt tolerated TKA exercises well, and transferred to EOB with mod I. Pt stood with min A x2 - bed raise to mimic bed at home and cues to push up from bed instead of pull on rwx. Pt ambulated 25ft in room with rwx and CGA - cues for upright posture and increasing step length on LLE as well as sequencing with movement of rwx. Pt agreeable to sitting UIC and left with all needs met - fresh ice and nsg present with pain meds. Pt will continue to benefit from skilled PT to address functional deficits and improve overall mobility. Anticipate D/C home with HHPT.

## 2022-07-15 NOTE — OP NOTE
ROBOTIC Total Knee Replacement Operative Note  Dr. SHIRA Hammonds II  [phone]    PATIENT NAME: Fe Arevalo  MRN: [id number]  : 1950 AGE: 72 y.o. GENDER: female  DATE OF OPERATION: 7/15/2022  PREOPERATIVE DIAGNOSIS: End Stage Arthritis  POSTOPERATIVE DIAGNOSIS: Same  OPERATION PERFORMED: Right Robotic Assisted Total Knee Arthroplasty  SURGEON: Nacho Hammonds MD  Circulator: Ivania Matos RN  Scrub Person: Joselito Jones; Aundrea Wolfe  Vendor Representative: Apolinar Rios  ANESTHESIA: General  ASSISTANT: Gabriela Zavala. This case would not have been possible without another set of skilled surgical hands for retraction, use of instrumentation, and general assistance.  This assistance was vital to the success of the case.   ESTIMATED BLOOD LOSS: 50cc  SPONGE AND NEEDLE COUNT: Correct  INDICATIONS:   A discussion of operative versus nonoperative treatment was had with the patient and they failed conservative management. They elected to undergo total knee arthroplasty. The risks of surgery were discussed and included the risk of anesthesia, infection, damage to neurovascular structures, implant loosening/failure, fracture, hardware prominence, continued pain, early failure, the need for further procedures, medical complications, and others. No guarantees were made. The patient wished to proceed with surgery and a surgical consent was signed.    COMPONENTS:   · Journey II BCS Oxinium Femoral Component: Size 6  · Journey II Tibial Baseplate: 4   · Posterior Stabilized Insert: 11  · Patella: 32mm    POSTOP ROBOTIC ROM: Extension: 0 Flexion: 125    PERTINENT FINDINGS: Degenerative Arthritis    DETAILS OF PROCEDURE:  The patient was met in the preoperative area. The site was marked. The consent and H&P were reviewed. The patient was then wheeled back to the operative suite and transferred to the operative table. The patient underwent anesthesia. A tourniquet was placed on the upper thigh. Surgical  alcohol was used to thoroughly clean the entire operative extremity.     The leg was then prepped in the normal sterile fashion and surgical space suits were used for the entire operative team. New outer gloves were used by all sterile surgical team members after final draping. After a surgical timeout, the tourniquet was inflated.     I began by inserting 2 pins into the tibial and femoral shafts and attaching the tibial robotic .    In flexion, a midline knee incision was utilized centered on the patella and ending medial to the tibial tubercle. Dissection was carried down to the knee capsule. A medial parapatellar ararthrotomy was completed. The patellar fat pad was excised. The MCL was minimally elevated to gain adequate exposure to the knee.  The suprapatellar fat pad was also excised.  The patella was subluxed laterally. The patella was held vertical using 2 clamps, and was then cut using a saw. The patella was then sized, and the lug holes were drilled. Excess patellar bone was removed using a saw. The patella was then protected during this case using the metal patella shield.    The tibial and femoral guides were then attached to the pins.  I did utilize a femoral button but not a tibial button.    The ACL, meniscus, and PCL were then resected.  Next I proceeded with a standard mapping of the knee including all relevant anatomic positions, range of motion, and stressing of ligaments.  I then planned out the knee including implant sizes, rotation, and bony resection to appropriately balance the knee as needed.      After the mapping and planning was complete, I turned my attention to the distal femur.  Using the bur, I was able to remove the distal femoral bone and create the initial lug holes.  I then used the punch to create the pinholes for the 5-in-1 cutting block.  The 5-in-1 cutting block was then snapped into place and pinned.  I used a saw to resect the anterior posterior and chamfer cuts.   These were all removed using a rongeur.    I then turned my attention to the tibia.  Retractors were placed appropriately.  Using the robot for guidance, a cutting jig was attached to the tibia using 2 pins.  This was done just above the level of measured resection.  I then used a saw to cut the tibia and remove this bone.  At that point, I was able to use the bur to resect down to the actual intended target tibial resection line.  This was verified to be accurate afterwards on the robot screen.    At that point, the remaining meniscus and osteophytes were removed.  I then attached the femoral component which was well-seated. The femoral BCS box was then prepared for.  I then trialed the knee and was noted to be in excellent balance with good range of motion.    We next turned our attention back to the tibia to finish the tibial preparation. The tibia was measured and sized. The tibial plate was aligned with the rotation from the trialing process and verified to be positioned near the medial third of the tibial tubercle. The tibial surface was then prepared for the keel.     The knee was thoroughly irrigated with sterile saline using a pulse-lavage system while the final tibial baseplate, femoral component and patellar component were opened. Cement was prepared and mixed using standard techniques. Outer gloves were changed before implant handling to ensure no soft tissue or oily material was exposed to the surfaces of the final implants. The bony knee surfaces were dried and the implants were cemented in place, starting with the tibia, then the femur and finally the patella. Excess cement was removed at each step. A trial poly was utilized during cementation for compression. The tourniquet was taken down and adequate hemostasis was achieved. The knee was thoroughly irrigated once again.     The soft tissues about the knee were then injected with an anesthetic cocktail. Care was taken to avoid the peroneal nerve and  "the neurovascular bundle posteriorly. The cement was allowed to harden.  While the cement was hardening, I did remove all 4 pins and the and femoral button.  The tibial and femoral pin sites were closed.    After the cement was fully set, the knee was ranged with various thickness of polyethylene trials to ensure that the balance was appropriate after robotic resection. The knee was inspected for excess cement, which was removed. The real poly, of corresponding thickness was then opened and inserted into the knee. One final range of motion and stability test showed the knee to be in good condition with a well tracking patellar component.    The knee capsule was then closed with a running barbed suture as well as interrupted Ethibond sutures. The knee was then closed in layers.  A sterile dressing was applied.    The patient was awoken from anesthesia, moved to the [facility] and taken to the recovery room in stable condition. Sponge and needle count were correct. There were no complications. Patient tolerated the procedure well.    R \"Stevan\" Cassius DAVIES MD  Orthopaedic Surgery  [facility]  [phone]                0  "

## 2022-07-15 NOTE — DISCHARGE PLACEMENT REQUEST
"J Carlos Hernadez (72 y.o. Female)             Date of Birth   1950    Social Security Number       Address   [address] [place] [address]    Home Phone   [phone]    MRN   [id number]       Worship   Sikhism    Marital Status                               Admission Date   7/15/22    Admission Type   Elective    Admitting Provider   Nacho Hammonds II, MD    Attending Provider   Nacho Hammonds II, MD    Department, Room/Bed   [address], P876/1       Discharge Date       Discharge Disposition       Discharge Destination                               Attending Provider: Nacho Hammonds II, MD    Allergies: Pantoprazole, Morphine And Related, Adhesive Tape, Benadryl [Diphenhydramine], Ciprofloxacin, Codeine    Isolation: None   Infection: None   Code Status: CPR   Advance Care Planning Activity    Ht: 175.3 cm (69\")   Wt: 116 kg (255 lb)    Admission Cmt: None   Principal Problem: None                Active Insurance as of 7/15/2022     Primary Coverage     Payor Plan Insurance Group Employer/Plan Group    MEDICARE MEDICARE A & B      Payor Plan Address Payor Plan Phone Number Payor Plan Fax Number Effective Dates    [address] [phone]  3/1/2009 - None Entered    [facility] [id number]       Subscriber Name Subscriber Birth Date Member ID       J CARLOS HERNADEZ 1950 [id number]           Secondary Coverage     Payor Plan Insurance Group Employer/Plan Group    [place] [facility]      Payor Plan Address Payor Plan Phone Number Payor Plan Fax Number Effective Dates    [address] [place] [address] [place] [phone]  5/1/2017 - None Entered    [facility] [id number]       Subscriber Name Subscriber Birth Date Member ID       J CARLOS HERNADEZ 1950 [id number]                 Emergency Contacts      (Rel.) Home Phone Work Phone Mobile Phone    robert hernadez (Son) [phone] -- [phone]    Priya Lynn " (Daughter) [phone] -- --

## 2022-07-15 NOTE — PLAN OF CARE
Goal Outcome Evaluation:  Plan of Care Reviewed With: patient        Progress: improving  Outcome Evaluation: Pt is 72/F S/P of right TKA. PRN pain meds. VSS. Dressing C/D/I and MATTHEW flashing green. Up with assist x1 and walker. Voiding function intact. Plan to d/c home tomorrow with family. Will continue to monitor

## 2022-07-15 NOTE — H&P
Orthopaedic Surgery  History & Physical For Elective Total Knee  Dr. SHIRA Hammonds II  [phone]    HPI:  Patient is a 72 y.o. Not  or  female who presents with End-stage arthritis of the right knee. They failed conservative treatment of their knee pain and a thorough discussion of the risks and benefits of surgery was had. The patient wishes to continue with elective total knee replacement, they were scheduled and are here for surgery. They did get medical clearance as well as a thorough preoperative workup.    MEDICAL HISTORY  Past Medical History:   Diagnosis Date   • A-fib ([facility])    • Histoplasmosis     STATES BILAT EYES:  INJECTIONS INTO HER EYES BILAT EVERY 7 WEEKS.  DENIES VISION LOSS   • History of blood clots 08/2021    PT STATES WAS DIAGNOSED WITH BLOOD CLOT BEHIND HER HEART AUG 2021-PT STATES THIS IS RESOLVED AND HAD CARDIAC ABLATION APRIL 2022   • Hypertension    • Lab test positive for detection of COVID-19 virus 02/2022   • Osteoarthritis of right knee    • Osteomyelitis (HCC) 2006    MRSA RIGHT FOOT   • PONV (postoperative nausea and vomiting)    • Right knee pain     WEAKNESS AND LIMITED MOBILITY   ·   Past Surgical History:   Procedure Laterality Date   • CARDIAC ABLATION  04/29/2022    X2.  PT HAS ATRICLIP DANISHA EXCLUSION SYSTEM IN PLACE THAT PT STATES IS NOT WORKING   • CHOLECYSTECTOMY     • COLONOSCOPY     • FOOT SURGERY Right     x7 DT OSTEOMYELITIS   • HYSTERECTOMY  2004   •  SHUNT INSERTION     ·   Prior to Admission medications    Medication Sig Start Date End Date Taking? Authorizing Provider   Chlorhexidine Gluconate 2 % pads Apply  topically. AS DIRECTED PAT   Yes Herman Dye MD   hydrALAZINE (APRESOLINE) 25 MG tablet Take 25 mg by mouth 2 (Two) Times a Day.   Yes ProviderHerman MD   HYDROcodone-acetaminophen (NORCO) 7.5-325 MG per tablet Take 1 tablet by mouth Every 6 (Six) Hours As Needed. STATES TAKING THREE TIMES DAILY 8/7/17  Yes Marnie  MD Herman   lisinopril-hydrochlorothiazide (PRINZIDE,ZESTORETIC) 20-12.5 MG per tablet Take 1 tablet by mouth Daily.   Yes Herman Dye MD   meclizine (ANTIVERT) 25 MG tablet Take 25 mg by mouth Daily. MAY TAKE DURING THE DAY IF NEEDED   Yes Herman Dye MD   metoprolol tartrate (LOPRESSOR) 50 MG tablet Take 50 mg by mouth 2 (Two) Times a Day.   Yes Herman Dye MD   mupirocin (BACTROBAN) 2 % ointment Apply 1 application topically to the appropriate area as directed 3 (Three) Times a Day. AS DIRECTED PAT   Yes Herman Dye MD   Cholecalciferol (Vitamin D3) 50 MCG (2000 UT) capsule Take 2,000 Units by mouth Daily.    Herman Dye MD   estradiol (ESTRACE) 1 MG tablet Take 1 mg by mouth Daily. 6/30/17   Herman Dye MD   multivitamin with minerals tablet tablet Take 1 tablet by mouth Daily. HOLDING FOR SURGERY    Herman Dye MD   NON FORMULARY 1 dose Every Night. TOPICAL CREAM-USES AT NIGHT FOR RIGHT KNEE PAIN, STATES THIS ESSENTIAL OILS  HOLD FOR SURGERY    Herman Dye MD   warfarin (COUMADIN) 5 MG tablet Take 5 mg by mouth Daily. WEDNESDAY AND SUNDAY    Herman Dye MD   warfarin (COUMADIN) 5 MG tablet Take 7.5 mg by mouth Daily. ALL DAYS EXCEPT WEDNESDAY AND SUNDAY    Herman Dye MD   ·   Allergies   Allergen Reactions   • Pantoprazole Anaphylaxis and Angioedema   • Morphine And Related Other (See Comments) and Palpitations     Pt does not like the way it makes her feel.  Does not want it..     • Adhesive Tape Other (See Comments)     BLISTER   • Benadryl [Diphenhydramine] Palpitations   • Ciprofloxacin Rash   • Codeine Other (See Comments)     CHEST PAIN   ·   Most Recent Immunizations   Administered Date(s) Administered   • COVID-19 (MODERNA) 1st, 2nd, 3rd Dose Only 03/26/2021   • COVID-19 (MODERNA) BOOSTER 11/10/2021   ·   Social History     Tobacco Use   • Smoking status: Former Smoker     Packs/day: 0.50     Years:  10.00     Pack years: 5.00     Types: Cigarettes     Quit date:      Years since quittin.5   • Smokeless tobacco: Never Used   • Tobacco comment: SOCIALLY SMOKED   Substance Use Topics   • Alcohol use: Yes     Comment: RARELY   ·    Social History     Substance and Sexual Activity   Drug Use Never   ·     REVIEW OF SYSTEMS:  · Head: negative for headache  · Respiratory: negative for shortness of breath.   · Cardiovascular: negative for chest pain.   · Gastrointestinal: negative abdominal pain.   · Neurological: negative for LOC  · Psychiatric/Behavioral: negative for memory loss.   · All other systems reviewed and are negative    VITALS: /61 (BP Location: Left arm, Patient Position: Lying)   Pulse 78   Temp 97.6 °F (36.4 °C) (Oral)   Resp 16   SpO2 100%  There is no height or weight on file to calculate BMI.    PHYSICAL EXAM:   · CONSTITUTIONAL: A&Ox3, No acute distress  · LUNGS: Equal chest rise, no shortness of air  · CARDIOVASCULAR: palpable peripheral pulses  · SKIN: no skin lesions in the area examined  · LYMPH: no lymphadenopathy in the area examined  · EXTREMITY: Knee  · Pulses:  Brisk Capillary Refill  · Sensation: Intact to Saphenous, Sural, Deep Peroneal, Superficial Peroneal, and Tibial Nerves and grossly throughout extremity  · Motor: 5/5 EHL/FHL/TA/GS motor complexes    RADIOLOGY REVIEW:   XR Knee 1 or 2 View Right    Result Date: 2022  Advanced osteoarthritis at the right knee.  This report was finalized on 2022 10:19 AM by Dr. Apolinar David M.D.        LABS:   Results for the past 24 hours:   Recent Results (from the past 24 hour(s))   Protime-INR    Collection Time: 07/15/22  5:59 AM    Specimen: Arm, Left; Blood   Result Value Ref Range    Protime 15.3 (H) 11.7 - 14.2 Seconds    INR 1.23 (H) 0.90 - 1.10       IMPRESSION:  Patient is a 72 y.o. Not  or  female with end-stage arthritis of the right knee    PLAN:   · Surgery: Elective total knee  arthroplasty  · Consent: The risks and benefits of operative versus nonoperative treatment were discussed. The patient elected to undergo operative treatment of their knee arthritis. The risks discussed included but were not limited to blood clots, MI, stroke, other medical complications, infection, damage to neurovascular structures, continued pain, hardware prominence, loss of range of motion, need for further procedures, and and risk of anesthesia..  No guarantees were made   · Disposition: Elective right Total Knee Arthroplasty today.    Nacho Hammonds II, MD  Orthopaedic Surgery  [facility]

## 2022-07-15 NOTE — ANESTHESIA POSTPROCEDURE EVALUATION
Patient: Fe Arevalo    Procedure Summary     Date: 07/15/22 Room / Location:  [facility]  [facility]    Anesthesia Start: 0708 Anesthesia Stop: 0838    Procedure: TOTAL KNEE ARTHROPLASTY WITH ROBOT (Right Knee) Diagnosis:     Surgeons: Nacho Hammonds II, MD Provider: Alex Salmeron MD    Anesthesia Type: general ASA Status: 3          Anesthesia Type: general    Vitals  Vitals Value Taken Time   /54 07/15/22 1145   Temp 36.4 °C (97.5 °F) 07/15/22 0835   Pulse 72 07/15/22 1152   Resp 10 07/15/22 1130   SpO2 100 % 07/15/22 1152   Vitals shown include unvalidated device data.        Post Anesthesia Care and Evaluation    Patient location during evaluation: bedside  Patient participation: complete - patient participated  Level of consciousness: awake  Pain management: adequate    Airway patency: patent  Anesthetic complications: No anesthetic complications    Cardiovascular status: acceptable  Respiratory status: acceptable  Hydration status: acceptable    Comments: */73   Pulse 55   Temp 36.4 °C (97.5 °F) (Oral)   Resp 10   SpO2 98%

## 2022-07-15 NOTE — ANESTHESIA PREPROCEDURE EVALUATION
Anesthesia Evaluation     history of anesthetic complications: PONV  NPO Solid Status: > 8 hours  NPO Liquid Status: > 2 hours           Airway   Mallampati: II  Neck ROM: full  no difficulty expected  Dental    (+) upper dentures    Comment: Lower partial    Pulmonary - normal exam   (+) a smoker Former,   (-) COPD, asthma, sleep apnea    PE comment: nonlabored  Cardiovascular - normal exam    Rhythm: regular  Rate: normal    (+) hypertension, dysrhythmias (A-fib--s/p successful ablation in April '22, still on blood thinners (Lovenox bridge for surgery)) Atrial Fib,   (-) valvular problems/murmurs, past MI, CAD, angina      Neuro/Psych- negative ROS  (-) seizures, TIA, CVA  GI/Hepatic/Renal/Endo - negative ROS   (-) GERD, liver disease, no renal disease, diabetes, no thyroid disorder    Musculoskeletal     (+) arthralgias,       ROS comment: Osteomyelitis   Abdominal    Substance History      OB/GYN          Other   arthritis,      ROS/Med Hx Other: Occular histoplasmosis;  H/o asymptomatic COVID                  Anesthesia Plan    ASA 3     general     (Plan multi-modal PONV prophylaxis )  intravenous induction     Anesthetic plan, risks, benefits, and alternatives have been provided, discussed and informed consent has been obtained with: patient.        CODE STATUS:

## 2022-07-16 VITALS
SYSTOLIC BLOOD PRESSURE: 111 MMHG | HEIGHT: 69 IN | WEIGHT: 255 LBS | TEMPERATURE: 96.9 F | DIASTOLIC BLOOD PRESSURE: 61 MMHG | HEART RATE: 70 BPM | OXYGEN SATURATION: 100 % | BODY MASS INDEX: 37.77 KG/M2 | RESPIRATION RATE: 18 BRPM

## 2022-07-16 LAB
INR PPP: 1.29 (ref 0.9–1.1)
PROTHROMBIN TIME: 15.9 SECONDS (ref 11.7–14.2)

## 2022-07-16 PROCEDURE — 97530 THERAPEUTIC ACTIVITIES: CPT | Performed by: PHYSICAL THERAPIST

## 2022-07-16 PROCEDURE — 25010000002 ENOXAPARIN PER 10 MG: Performed by: ORTHOPAEDIC SURGERY

## 2022-07-16 PROCEDURE — 85610 PROTHROMBIN TIME: CPT | Performed by: ORTHOPAEDIC SURGERY

## 2022-07-16 PROCEDURE — G0378 HOSPITAL OBSERVATION PER HR: HCPCS

## 2022-07-16 RX ORDER — OXYCODONE HYDROCHLORIDE AND ACETAMINOPHEN 5; 325 MG/1; MG/1
1 TABLET ORAL EVERY 4 HOURS PRN
Qty: 50 TABLET | Refills: 0 | Status: SHIPPED | OUTPATIENT
Start: 2022-07-16 | End: 2022-08-22 | Stop reason: HOSPADM

## 2022-07-16 RX ADMIN — ENOXAPARIN SODIUM 30 MG: 30 INJECTION SUBCUTANEOUS at 09:24

## 2022-07-16 RX ADMIN — FAMOTIDINE 40 MG: 20 TABLET ORAL at 09:24

## 2022-07-16 RX ADMIN — OXYCODONE AND ACETAMINOPHEN 2 TABLET: 5; 325 TABLET ORAL at 11:49

## 2022-07-16 RX ADMIN — METOPROLOL TARTRATE 50 MG: 50 TABLET, FILM COATED ORAL at 09:24

## 2022-07-16 RX ADMIN — HYDRALAZINE HYDROCHLORIDE 25 MG: 25 TABLET, FILM COATED ORAL at 09:24

## 2022-07-16 RX ADMIN — OXYCODONE AND ACETAMINOPHEN 2 TABLET: 5; 325 TABLET ORAL at 00:10

## 2022-07-16 RX ADMIN — OXYCODONE AND ACETAMINOPHEN 2 TABLET: 5; 325 TABLET ORAL at 07:54

## 2022-07-16 RX ADMIN — LISINOPRIL: 20 TABLET ORAL at 09:24

## 2022-07-16 NOTE — THERAPY TREATMENT NOTE
Patient Name: Fe Arevalo  : 1950    MRN: [id number]                              Today's Date: 2022       Admit Date: 7/15/2022    Visit Dx: No diagnosis found.  Patient Active Problem List   Diagnosis   • Arthritis of ankle   • Wound dehiscence   • Pain and swelling of ankle   • Status post knee replacement     Past Medical History:   Diagnosis Date   • A-fib (HCC)    • Histoplasmosis     STATES BILAT EYES:  INJECTIONS INTO HER EYES BILAT EVERY 7 WEEKS.  DENIES VISION LOSS   • History of blood clots 2021    PT STATES WAS DIAGNOSED WITH BLOOD CLOT BEHIND HER HEART AUG 2021-PT STATES THIS IS RESOLVED AND HAD CARDIAC ABLATION 2022   • Hypertension    • Lab test positive for detection of COVID-19 virus 2022   • Osteoarthritis of right knee    • Osteomyelitis (HCC) 2006    MRSA RIGHT FOOT   • PONV (postoperative nausea and vomiting)    • Right knee pain     WEAKNESS AND LIMITED MOBILITY     Past Surgical History:   Procedure Laterality Date   • CARDIAC ABLATION  04/29/2022    X2.  PT HAS ATRICLIP DANISHA EXCLUSION SYSTEM IN PLACE THAT PT STATES IS NOT WORKING   • CHOLECYSTECTOMY     • COLONOSCOPY     • FOOT SURGERY Right     x7 DT OSTEOMYELITIS   • HYSTERECTOMY     •  SHUNT INSERTION        General Information     Row Name 22 1307          Physical Therapy Time and Intention    Document Type therapy note (daily note)  -GJ     Mode of Treatment individual therapy;physical therapy  -     Row Name 22 1300          General Information    Patient Profile Reviewed yes  -GJ     Existing Precautions/Restrictions no known precautions/restrictions  -GJ     Barriers to Rehab none identified  -GJ     Row Name 22 1301          Cognition    Orientation Status (Cognition) oriented x 3  -GJ           User Key  (r) = Recorded By, (t) = Taken By, (c) = Cosigned By    Initials Name Provider Type    Patricio Cutler, PT Physical Therapist               Mobility     Row Name 22  1307          Bed Mobility    Supine-Sit [place] (Bed Mobility) modified independence  -GJ     Row Name 07/16/22 1307          Sit-Stand Transfer    Sit-Stand [place] (Transfers) modified independence  -GJ     Assistive Device (Sit-Stand Transfers) walker, front-wheeled  -GJ     Row Name 07/16/22 1307          Gait/Stairs (Locomotion)    [place] Level (Gait) modified independence  -GJ     Assistive Device (Gait) walker, front-wheeled  -GJ     Distance in Feet (Gait) 250  -GJ           User Key  (r) = Recorded By, (t) = Taken By, (c) = Cosigned By    Initials Name Provider Type    Patricio Cutler, PT Physical Therapist               Obj/Interventions     Row Name 07/16/22 1308          Motor Skills    Therapeutic Exercise knee;ankle  -[facility] Name 07/16/22 1308          Knee (Therapeutic Exercise)    Knee (Therapeutic Exercise) AROM (active range of motion)  -GJ     Knee AROM (Therapeutic Exercise) bilateral;LAQ (long arc quad);15 repititions;sitting  -[facility] Name 07/16/22 1308          Ankle (Therapeutic Exercise)    Ankle (Therapeutic Exercise) AROM (active range of motion)  -GJ     Ankle AROM (Therapeutic Exercise) bilateral;dorsiflexion;plantarflexion;15 repititions;sitting  -           User Key  (r) = Recorded By, (t) = Taken By, (c) = Cosigned By    Initials Name Provider Type    Patricio Cutler, PT Physical Therapist               Goals/Plan    No documentation.                Clinical Impression     [facility] Name 07/16/22 1309          Pain    Pain Intervention(s) Medication (See MAR)  -     Row Name 07/16/22 1309          Plan of Care Review    Plan of Care Reviewed With patient;family  -[facility] Name 07/16/22 1309          Positioning and Restraints    Pre-Treatment Position in bed  -GJ     Post Treatment Position chair  -GJ     In Chair notified nsg;reclined;call light within reach;encouraged to call for assist;with family/caregiver;legs elevated  -           User Key  (r) =  Recorded By, (t) = Taken By, (c) = Cosigned By    Initials Name Provider Type    Patricio Cutler, PT Physical Therapist               Outcome Measures     Row Name 07/16/22 1310          How much help from another person do you currently need...    Turning from your back to your side while in flat bed without using bedrails? 3  -GJ     Moving from lying on back to sitting on the side of a flat bed without bedrails? 3  -GJ     Moving to and from a bed to a chair (including a wheelchair)? 3  -GJ     Standing up from a chair using your arms (e.g., wheelchair, bedside chair)? 3  -GJ     Climbing 3-5 steps with a railing? 3  -GJ     To walk in hospital room? 3  -GJ     AM-PAC 6 Clicks Score (PT) 18  -GJ     Highest level of mobility 6 --> Walked 10 steps or more  -     Row Name 07/16/22 1310          Functional Assessment    Outcome Measure Options AM-PAC 6 Clicks Basic Mobility (PT)  -           User Key  (r) = Recorded By, (t) = Taken By, (c) = Cosigned By    Initials Name Provider Type    Patricio Cutler, PT Physical Therapist                             Physical Therapy Education                 Title: PT OT SLP Therapies (Done)     Topic: Physical Therapy (Done)     Point: Mobility training (Done)     Learning Progress Summary           Patient Acceptance, E, VU by  at 7/16/2022 1310    Comment: answered questions    Acceptance, E,TB,D, VU,NR by  at 7/15/2022 1501   Family Acceptance, E, VU by  at 7/16/2022 1310    Comment: answered questions                   Point: Home exercise program (Done)     Learning Progress Summary           Patient Acceptance, E, VU by  at 7/16/2022 1310    Comment: answered questions    Acceptance, E,TB,D, VU,NR by  at 7/15/2022 1501   Family Acceptance, E, VU by  at 7/16/2022 1310    Comment: answered questions                   Point: Body mechanics (Done)     Learning Progress Summary           Patient Acceptance, E, VU by  at 7/16/2022 1310    Comment:  answered questions    Acceptance, E,TB,D, VU,NR by  at 7/15/2022 1501   Family Acceptance, E, VU by  at 7/16/2022 1310    Comment: answered questions                   Point: Precautions (Done)     Learning Progress Summary           Patient Acceptance, E, VU by  at 7/16/2022 1310    Comment: answered questions    Acceptance, E,TB,D, VU,NR by  at 7/15/2022 1501   Family Acceptance, E, VU by  at 7/16/2022 1310    Comment: answered questions                               User Key     Initials Effective Dates Name Provider Type Discipline     06/16/21 -  Patricio Mathew, PT Physical Therapist PT     04/08/22 -  Yvette May PT Physical Therapist PT              PT Recommendation and Plan     Plan of Care Reviewed With: patient, family     Time Calculation:    PT Charges     Row Name 07/16/22 1312             Time Calculation    Start Time 0855  -GJ      Stop Time 0912  -GJ      Time Calculation (min) 17 min  -GJ      PT Received On 07/16/22  -              Timed Charges    85338 - PT Therapeutic Activity Minutes 15  -GJ              Total Minutes    Timed Charges Total Minutes 15  -GJ       Total Minutes 15  -GJ            User Key  (r) = Recorded By, (t) = Taken By, (c) = Cosigned By    Initials Name Provider Type     Patricio Mathew, PT Physical Therapist              Therapy Charges for Today     Code Description Service Date Service Provider Modifiers Qty    78075010682 HC PT THERAPEUTIC ACT EA 15 MIN 7/16/2022 Patricio Mathew, PT GP 1          PT G-Codes  Outcome Measure Options: AM-PAC 6 Clicks Basic Mobility (PT)  AM-PAC 6 Clicks Score (PT): 18    Patricio Mathew, PT  7/16/2022

## 2022-07-16 NOTE — DISCHARGE SUMMARY
Orthopaedic Discharge Summary  Dr. SILVA “Stevan” Cassius II  [phone]    NAME: Fe LAUREANO Irina PCP: Dion Curry MD   :  MRN: [id number]  [id number] LOS:  ADMIT: 0 days  7/15/2022   AGE/SEX: 72 y.o. female DC:  today             · Admitting Diagnosis: Status post knee replacement [Z96.659]    · Surgery Performed: ND TOTAL KNEE ARTHROPLASTY [35314] (TOTAL KNEE ARTHROPLASTY WITH ROBOT)    · Discharge Medications:         Discharge Medications      New Medications      Instructions Start Date   oxyCODONE-acetaminophen 5-325 MG per tablet  Commonly known as: PERCOCET   1 tablet, Oral, Every 4 Hours PRN         Continue These Medications      Instructions Start Date   Chlorhexidine Gluconate 2 % pads   Apply externally, AS DIRECTED PAT      estradiol 1 MG tablet  Commonly known as: ESTRACE   1 mg, Oral, Daily      hydrALAZINE 25 MG tablet  Commonly known as: APRESOLINE   25 mg, Oral, 2 Times Daily      HYDROcodone-acetaminophen 7.5-325 MG per tablet  Commonly known as: NORCO   1 tablet, Oral, Every 6 Hours PRN, STATES TAKING THREE TIMES DAILY      lisinopril-hydrochlorothiazide 20-12.5 MG per tablet  Commonly known as: PRINZIDE,ZESTORETIC   1 tablet, Oral, Daily      meclizine 25 MG tablet  Commonly known as: ANTIVERT   25 mg, Oral, Daily, MAY TAKE DURING THE DAY IF NEEDED      metoprolol tartrate 50 MG tablet  Commonly known as: LOPRESSOR   50 mg, Oral, 2 Times Daily      multivitamin with minerals tablet tablet   1 tablet, Oral, Daily, HOLDING FOR SURGERY      mupirocin 2 % ointment  Commonly known as: BACTROBAN   1 application, Topical, 3 Times Daily, AS DIRECTED PAT      NON FORMULARY   1 dose, Nightly, TOPICAL CREAM-USES AT NIGHT FOR RIGHT KNEE PAIN, STATES THIS ESSENTIAL OILS HOLD FOR SURGERY      Vitamin D3 50 MCG (2000) capsule   2,000 Units, Oral, Daily      warfarin 5 MG tablet  Commonly known as: COUMADIN   5 mg, Oral, Daily Warfarin, WEDNESDAY AND       warfarin 5 MG tablet  Commonly known  "as: COUMADIN   7.5 mg, Oral, Daily Warfarin, ALL DAYS EXCEPT WEDNESDAY AND SUNDAY             · Vitals:     Vitals:    07/15/22 1316 07/15/22 1441 07/15/22 1900 07/15/22 2304   BP: 135/63 143/53 142/70 121/65   BP Location:   Right arm Right arm   Patient Position:   Lying Lying   Pulse: 58 73 71 76   Resp: 16 16 18 18   Temp: 96.9 °F (36.1 °C) 96.8 °F (36 °C) 97 °F (36.1 °C) 96.6 °F (35.9 °C)   TempSrc: Oral Oral Oral Oral   SpO2: 100% 100% 95% 99%   Weight:  116 kg (255 lb)     Height:  175.3 cm (69\")         · Labs:      Admission on 07/15/2022   Component Date Value Ref Range Status   • Protime 07/15/2022 15.3 (A) 11.7 - 14.2 Seconds Final   • INR 07/15/2022 1.23 (A) 0.90 - 1.10 Final   • Glucose 07/15/2022 182 (A) 65 - 99 mg/dL Final   • BUN 07/15/2022 37 (A) 8 - 23 mg/dL Final   • Creatinine 07/15/2022 1.68 (A) 0.57 - 1.00 mg/dL Final   • Sodium 07/15/2022 132 (A) 136 - 145 mmol/L Final   • Potassium 07/15/2022 5.2  3.5 - 5.2 mmol/L Final    Slight hemolysis detected by analyzer. Results may be affected.   • Chloride 07/15/2022 102  98 - 107 mmol/L Final   • CO2 07/15/2022 18.0 (A) 22.0 - 29.0 mmol/L Final   • Calcium 07/15/2022 9.7  8.6 - 10.5 mg/dL Final   • BUN/Creatinine Ratio 07/15/2022 22.0  7.0 - 25.0 Final   • Anion Gap 07/15/2022 12.0  5.0 - 15.0 mmol/L Final   • eGFR 07/15/2022 32.6 (A) >60.0 mL/min/1.73 Final    National Kidney Foundation and American Society of Nephrology (ASN) Task Force recommended calculation based on the Chronic Kidney Disease Epidemiology Collaboration (CKD-EPI) equation refit without adjustment for race.   • WBC 07/15/2022 10.30  3.40 - 10.80 10*3/mm3 Final   • RBC 07/15/2022 3.39 (A) 3.77 - 5.28 10*6/mm3 Final   • Hemoglobin 07/15/2022 10.4 (A) 12.0 - 15.9 g/dL Final   • Hematocrit 07/15/2022 32.6 (A) 34.0 - 46.6 % Final   • MCV 07/15/2022 96.2  79.0 - 97.0 fL Final   • MCH 07/15/2022 30.7  26.6 - 33.0 pg Final   • MCHC 07/15/2022 31.9  31.5 - 35.7 g/dL Final   • RDW " "07/15/2022 12.1 (A) 12.3 - 15.4 % Final   • RDW-SD 07/15/2022 42.0  37.0 - 54.0 fl Final   • MPV 07/15/2022 10.6  6.0 - 12.0 fL Final   • Platelets 07/15/2022 211  140 - 450 10*3/mm3 Final   • Protime 07/15/2022 14.8 (A) 11.7 - 14.2 Seconds Final   • INR 07/15/2022 1.18 (A) 0.90 - 1.10 Final   • Glucose 07/15/2022 163 (A) 70 - 130 mg/dL Final    Meter: [id number] : [id number] Vitaly MONROE   • Protime 07/16/2022 15.9 (A) 11.7 - 14.2 Seconds Final   • INR 07/16/2022 1.29 (A) 0.90 - 1.10 Final        No results found.    · Hospital Course:   72 y.o. female was admitted to [facility], [place] to services of Nacho Hammonds II, MD with Status post knee replacement [Z96.659] on 7/15/2022 and underwent NE TOTAL KNEE ARTHROPLASTY [03248] (TOTAL KNEE ARTHROPLASTY WITH ROBOT). Post-operatively the patient transferred to the floor where the patient underwent mobilization therapy. Opioids were titrated to achieve appropriate pain management to allow for participation in mobilization exercises. Vital signs and laboratory values are now within safe parameters for discharge. The dressings and/or incision is intact without signs or symptoms of active infection. Operative extremity neurovascular status remains intact as compared to the preoperative exam. Appropriate education re: incision care, activity levels, medications, and follow up visits was completed and all questions were answered. The patient is now deemed stable for discharge.    HOME: The patient progressed well with physical therapy. There were cleared for discharge to home. The patietn was sent home in good condition}.       R \"Stevan\" Cassius DAVIES MD  Orthopaedic Surgery  [facility]  [phone]                                               "

## 2022-07-16 NOTE — PLAN OF CARE
Goal Outcome Evaluation:  Plan of Care Reviewed With: patient, family            Ms. Arevalo is POD1 R TKA.  She modified independent with gait to 250 ft, demonstrated modified independent bed mobility. Anticipated DC location home with home health.

## 2022-07-16 NOTE — PLAN OF CARE
Goal Outcome Evaluation:               Pt is a post op day 1 of a rt total knee. Dressing is clean dry and intact. Pt continues with the ACE and MATTHEW. Pt has ambulated to the bathroom with an assist x1, voiding function intact. Pt continues with PO pain meds that provide relief. Pt educated on the importance of monitoring blood pressures related to comorbidity to HTN. Pt voiced understanding. Daughter remains at bedside. Pt is resting at this time, will continue to monitor.

## 2022-07-16 NOTE — DISCHARGE INSTRUCTIONS
Total Knee  Discharge Instructions  Dr. SHIRA Harris” Cassius II  [phone]    INCISION CARE  Wash your hands prior to dressing changes  MATTHEW Wound VAC: Postoperatively you had a MATTHEW Wound Vac placed on the incision. This was placed under sterile conditions in the operating room. It remains in place for 7 days postoperatively. After 7 days, the entire dressing must be removed, including all of the sticky adhesive. The dressing and battery pack provide gentle suction to the incision and provide several benefits over a traditional dressing:  It maintains the sterile environment of the OR and reduces the risk of infection  The suction removes unwanted buildup of blood/hematoma under the skin to reduce swelling  The suction also promotes fresh blood supply to the skin and soft tissue to speed up healing  The postoperative scar is reduced in size  Showering is permitted immediately after surgery, but the battery pack must be protected or removed during the shower.   After 7 days the MATTHEW Wound Vac is removed. If there is no drainage, no dressing is required. If there is some scant drainage a dry bandage can be applied and changed daily until seen in the office or until the drainage stops.   No creams or ointments to the incisions until 4 weeks post op.  Do not touch or pick at the incision  Check incision every day and notify surgeon immediately if any of the following signs or symptoms are seen:  Increase in redness  Increase in swelling around the incision and of the entire extremity  Increase in pain  NEW drainage or oozing from the incision  Pulling apart of the edges of the incision  Increase in overall body temperature (greater than 100.4 degrees)  Zip-Line: your incision was closed with a state of the art device.   Is a non-invasive and easy to use wound closure device that replaces sutures, staples and glue for surgical incisions  It minimizes scarring and eliminating “railroad” marks that come with staples or  sutures  It makes removal as atraumatic as peeling off a bandage  Can be removed at home or by a physical therapist or nurse at 14 days postoperatively    ACTIVITIES  Exercises:  Physical therapy will begin immediately while in the hospital. Patients going to a nursing home will get therapy as part of their care at the SNU/SNF facility. Patients going home may also have a therapist come to the house to help them mobilize until they can safely get to an outpatient therapy facility.  Elevate the affected leg most of the day during the first week post operatively. Caution must be taken to avoid pillow placement directly under the heel of the leg, as this can cause pressure ulcers even with a soft pillow. All pillows and blankets should be placed underneath of the thigh and calf so that the heel is free-floating.  Use cold packs for 20-30 minutes approximately 5 times per day.  You should perform the daily stretching and strengthening exercises as taught by the therapist as often as possible. This can be done many times a day.  Full weight bearing is allowed after surgery. It will be sore/painful to put weight on the leg, but this will help the bone to heal and prevent complications such as pneumonia, bed sores and blood clots. Mobilization is vital to the recovery process.  Activities of Daily Living:  No tub baths, hot tubs, or swimming pools for 4 weeks.  May shower and let water run over the incision immediately after surgery. The battery pack of the MATTHEW Wound Vac must be protected or removed while in the shower. After the MATTHEW is removed 7 days after surgery showering is permitted as long as there is no drainage from the wound.     Restrictions  Weight: It is ok to allow full weight bearing after surgery. Weight on the leg actually quickens the recovery process. While it will be sore/painful to put weight on the leg, it is safe to do so. Hip replacement after hip fracture has a much slower recover process. It can  take months to heal fully from a hip fracture and patients even make some slow benefits up to a year afterwards.   Driving: Many patients have questions about when it is safe to return to driving. The answer is that this is extremely variable. It depends on the extent of the surgery, as well as how quickly you heal. Certainly left leg surgeries make returning to driving easier while right leg surgeries require more extensive rehabilitation before driving can be safe. Until you can press down on the brake hard, and are off of all narcotics, driving is not permitted. Your surgeon cannot “clear” you to return to driving, only you can make the decision when you feel it is safe.    Medications  Anticoagulants: After upper extremity surgery most patients do not require an anticoagulant unless you have another injury that will be keeping you from mobilizing. Lower extremity surgery typically does require use of an anticoagulation medicine.   IF YOU HAD LOWER EXTREMITY SURGERY AND ARE NOT DISCHARGED HOME WITH ANY ANTICOAGULANT MEDICINE YOU SHOULD TAKE ASPIRIN 325mg DAILY FOR 30 DAYS POSTOPERATIVELY.  If you are discharged home with an anticoagulant such as Aspirin, Xarelto, Eliquis, Coumadin, or Lovenox, follow these simple instructions:   Notify surgeon immediately if any patsy bleeding is noted in the urine, stool, emesis, or from the nose or the incision. Blood in the stool will often appear as black rather than red. Blood in urine may appear as pink. Blood in emesis may appear as brown/black like coffee grounds.  You will need to apply pressure for longer periods of time to any cuts or abrasions to stop bleeding  Avoid alcohol while taking anticoagulants  Most anticoagulants are to be taken for 30 days postoperatively. After this time, you may stop using them unless instructed otherwise.   If you were already taking an anticoagulant (commonly Aspirin, Coumadin, or Plavix) you will likely be resuming your normal dose  postoperatively and will be continuing that medication at the discretion of the prescribing physician.  Stool Softeners: You will be at greater risk of constipation after surgery due to being less mobile and the pain medications.  Take stool softeners as needed. Over the counter Colace 100 mg 1-2 capsules twice daily can be taken.  If stools become too loose or too frequent, please decreases the dosage or stop the stool softener.  If constipation occurs despite use of stool softeners, you are to continue the stool softeners and add a laxative (Milk of Magnesia 1 ounce daily as needed)  Drink plenty of fluids, and eat fruits and vegetables during your recovery time. Getting up and mobilizing will help the bowels to recover their regular function, as will weaning off of all narcotics when the pain becomes tolerable.  Pain Medications: Utilized after surgery are narcotics. This is some general information about these medications.  CLASSIFICATION: Pain medications are called Opioids and are narcotics  LEGALITIES: It is illegal to share narcotics with others  DRIVING: it is illegal to drive while under the influence of narcotics. Doing so is a DUI.  POTENTIAL SIDE EFFECTS: nausea, vomiting, itching, dizziness, drowsiness, dry mouth, constipation, and difficulty urinating.  POTENTIAL ADVERSE EFFECTS:  Opioid tolerance can develop with use of pain medications and this simply means that it requires more and more of the medication to control pain. However, this is seen more in patients that use opioids for longer periods of time.  Opioid dependence can develop with use of Opioids. People with opioid dependence will experience withdrawal symptoms upon cessation of the medication.  Opioid addiction can develop with use of Opioids. The incidence of this is very unlikely in patients who take the medications as ordered and stop the medications as instructed.  Opioid overdose can be dangerous, but is unlikely when the medication  is taken as ordered and stopped when ordered. It is important not to mix opioids with alcohol as this can lead to over sedation and respiratory difficulty.  DOSAGE:  After the initial surgical pain begins to resolve, you may begin to decrease the pain medication. By the end of a few weeks, you should be off of pain medications.  Refills will not be given by the office during evening hours, on weekends, or after 6 weeks post-op. You are responsible for weaning off of pain medication. You can increase the time between narcotic pills, taking one every 4 then 6 then 8 hours and so on.  To seek refills on pain medications during the initial 6-week post-operative period, you must call the office to request the refill. The office will then notify you when to  the prescription. DO NOT wait until you are out of the medication to request a refill. Prescriptions will not be filled over the weekend and depending on the schedule, it may take a couple days for the prescription to be available. Someone will have to pick the prescription in person at the office.    FOLLOW-UP VISITS  You will need to follow up in the office with your surgeon in 3 weeks, or as instructed elsewhere in your discharge paperwork. Please call this number [phone] to schedule this appointment. If you are going to an SNF/SNU facility, they will arrange for you to follow up in the office.  If you have any concerns or suspected complications prior to your follow up visit, please call the office. Do not wait until your appointment time if you suspect complications. These will need to be addressed in the office promptly.      Nacho Hammonds II, MD  Orthopaedic Surgery  [facility]

## 2022-07-18 NOTE — CASE MANAGEMENT/SOCIAL WORK
Case Management Discharge Note      Final Note: Home with [facility] .         Selected Continued Care - Discharged on 7/16/2022 Admission date: 7/15/2022 - Discharge disposition: Home or Self Care    Destination    No services have been selected for the patient.              Durable Medical Equipment    No services have been selected for the patient.              Dialysis/Infusion    No services have been selected for the patient.              Home Medical Care Coordination complete.    Service Provider Selected Services Address Phone Fax Patient Preferred    [facility] [address] [address] [phone] --          Therapy    No services have been selected for the patient.              Community Resources    No services have been selected for the patient.              Community & DME    No services have been selected for the patient.                       Final Discharge Disposition Code: 06 - home with home health care

## 2022-08-16 ENCOUNTER — HOSPITAL ENCOUNTER (INPATIENT)
Facility: HOSPITAL | Age: 72
LOS: 5 days | Discharge: NURSING, REHAB OR LONG-TERM CARE | End: 2022-08-22
Attending: ORTHOPAEDIC SURGERY | Admitting: ORTHOPAEDIC SURGERY

## 2022-08-16 DIAGNOSIS — Z96.659 STATUS POST KNEE REPLACEMENT, UNSPECIFIED LATERALITY: Primary | ICD-10-CM

## 2022-08-16 LAB
ANION GAP SERPL CALCULATED.3IONS-SCNC: 10.1 MMOL/L (ref 5–15)
BASOPHILS # BLD AUTO: 0.05 10*3/MM3 (ref 0–0.2)
BASOPHILS NFR BLD AUTO: 0.6 % (ref 0–1.5)
BUN SERPL-MCNC: 41 MG/DL (ref 8–23)
BUN/CREAT SERPL: 24.3 (ref 7–25)
CALCIUM SPEC-SCNC: 10.8 MG/DL (ref 8.6–10.5)
CHLORIDE SERPL-SCNC: 96 MMOL/L (ref 98–107)
CO2 SERPL-SCNC: 23.9 MMOL/L (ref 22–29)
CREAT SERPL-MCNC: 1.69 MG/DL (ref 0.57–1)
CRP SERPL-MCNC: 4.72 MG/DL (ref 0–0.5)
DEPRECATED RDW RBC AUTO: 40.6 FL (ref 37–54)
EGFRCR SERPLBLD CKD-EPI 2021: 32.4 ML/MIN/1.73
EOSINOPHIL # BLD AUTO: 0.19 10*3/MM3 (ref 0–0.4)
EOSINOPHIL NFR BLD AUTO: 2.4 % (ref 0.3–6.2)
ERYTHROCYTE [DISTWIDTH] IN BLOOD BY AUTOMATED COUNT: 12.3 % (ref 12.3–15.4)
ERYTHROCYTE [SEDIMENTATION RATE] IN BLOOD: 48 MM/HR (ref 0–30)
GLUCOSE SERPL-MCNC: 108 MG/DL (ref 65–99)
HCT VFR BLD AUTO: 29.4 % (ref 34–46.6)
HGB BLD-MCNC: 9.7 G/DL (ref 12–15.9)
IMM GRANULOCYTES # BLD AUTO: 0.03 10*3/MM3 (ref 0–0.05)
IMM GRANULOCYTES NFR BLD AUTO: 0.4 % (ref 0–0.5)
INR PPP: 2.7 (ref 0.9–1.1)
LYMPHOCYTES # BLD AUTO: 0.76 10*3/MM3 (ref 0.7–3.1)
LYMPHOCYTES NFR BLD AUTO: 9.4 % (ref 19.6–45.3)
MCH RBC QN AUTO: 30.4 PG (ref 26.6–33)
MCHC RBC AUTO-ENTMCNC: 33 G/DL (ref 31.5–35.7)
MCV RBC AUTO: 92.2 FL (ref 79–97)
MONOCYTES # BLD AUTO: 0.81 10*3/MM3 (ref 0.1–0.9)
MONOCYTES NFR BLD AUTO: 10.1 % (ref 5–12)
NEUTROPHILS NFR BLD AUTO: 6.21 10*3/MM3 (ref 1.7–7)
NEUTROPHILS NFR BLD AUTO: 77.1 % (ref 42.7–76)
NRBC BLD AUTO-RTO: 0 /100 WBC (ref 0–0.2)
PLATELET # BLD AUTO: 314 10*3/MM3 (ref 140–450)
PMV BLD AUTO: 10.3 FL (ref 6–12)
POTASSIUM SERPL-SCNC: 4 MMOL/L (ref 3.5–5.2)
PROTHROMBIN TIME: 28 SECONDS (ref 11.7–14.2)
RBC # BLD AUTO: 3.19 10*6/MM3 (ref 3.77–5.28)
SODIUM SERPL-SCNC: 130 MMOL/L (ref 136–145)
WBC NRBC COR # BLD: 8.05 10*3/MM3 (ref 3.4–10.8)

## 2022-08-16 PROCEDURE — 87077 CULTURE AEROBIC IDENTIFY: CPT | Performed by: ORTHOPAEDIC SURGERY

## 2022-08-16 PROCEDURE — 85652 RBC SED RATE AUTOMATED: CPT | Performed by: ORTHOPAEDIC SURGERY

## 2022-08-16 PROCEDURE — 85025 COMPLETE CBC W/AUTO DIFF WBC: CPT | Performed by: ORTHOPAEDIC SURGERY

## 2022-08-16 PROCEDURE — 80048 BASIC METABOLIC PNL TOTAL CA: CPT | Performed by: ORTHOPAEDIC SURGERY

## 2022-08-16 PROCEDURE — 86140 C-REACTIVE PROTEIN: CPT | Performed by: ORTHOPAEDIC SURGERY

## 2022-08-16 PROCEDURE — G0378 HOSPITAL OBSERVATION PER HR: HCPCS

## 2022-08-16 PROCEDURE — U0005 INFEC AGEN DETEC AMPLI PROBE: HCPCS | Performed by: INTERNAL MEDICINE

## 2022-08-16 PROCEDURE — 87186 SC STD MICRODIL/AGAR DIL: CPT | Performed by: ORTHOPAEDIC SURGERY

## 2022-08-16 PROCEDURE — U0004 COV-19 TEST NON-CDC HGH THRU: HCPCS | Performed by: INTERNAL MEDICINE

## 2022-08-16 PROCEDURE — 87205 SMEAR GRAM STAIN: CPT | Performed by: ORTHOPAEDIC SURGERY

## 2022-08-16 PROCEDURE — 85610 PROTHROMBIN TIME: CPT | Performed by: ORTHOPAEDIC SURGERY

## 2022-08-16 PROCEDURE — 87070 CULTURE OTHR SPECIMN AEROBIC: CPT | Performed by: ORTHOPAEDIC SURGERY

## 2022-08-16 RX ORDER — MECLIZINE HYDROCHLORIDE 25 MG/1
25 TABLET ORAL DAILY
Status: DISCONTINUED | OUTPATIENT
Start: 2022-08-16 | End: 2022-08-22 | Stop reason: HOSPADM

## 2022-08-16 RX ORDER — METOPROLOL TARTRATE 50 MG/1
50 TABLET, FILM COATED ORAL 2 TIMES DAILY
Status: DISCONTINUED | OUTPATIENT
Start: 2022-08-16 | End: 2022-08-22 | Stop reason: HOSPADM

## 2022-08-16 RX ORDER — HYDROCODONE BITARTRATE AND ACETAMINOPHEN 7.5; 325 MG/1; MG/1
1 TABLET ORAL EVERY 4 HOURS PRN
Status: DISCONTINUED | OUTPATIENT
Start: 2022-08-16 | End: 2022-08-19

## 2022-08-16 RX ORDER — HYDRALAZINE HYDROCHLORIDE 25 MG/1
25 TABLET, FILM COATED ORAL 2 TIMES DAILY
Status: DISCONTINUED | OUTPATIENT
Start: 2022-08-16 | End: 2022-08-22 | Stop reason: HOSPADM

## 2022-08-16 RX ORDER — OXYCODONE HYDROCHLORIDE AND ACETAMINOPHEN 5; 325 MG/1; MG/1
1 TABLET ORAL EVERY 4 HOURS PRN
Status: DISCONTINUED | OUTPATIENT
Start: 2022-08-16 | End: 2022-08-16

## 2022-08-16 RX ADMIN — LISINOPRIL: 20 TABLET ORAL at 17:46

## 2022-08-16 RX ADMIN — MECLIZINE HYDROCHLORIDE 25 MG: 25 TABLET ORAL at 17:46

## 2022-08-16 RX ADMIN — HYDROCODONE BITARTRATE AND ACETAMINOPHEN 1 TABLET: 7.5; 325 TABLET ORAL at 19:42

## 2022-08-16 RX ADMIN — HYDROCODONE BITARTRATE AND ACETAMINOPHEN 1 TABLET: 7.5; 325 TABLET ORAL at 23:54

## 2022-08-16 RX ADMIN — METOPROLOL TARTRATE 50 MG: 50 TABLET, FILM COATED ORAL at 21:00

## 2022-08-16 NOTE — PLAN OF CARE
Goal Outcome Evaluation:  Plan of Care Reviewed With: patient        Progress: no change  Outcome Evaluation: Patient direct admit.  VSS. Educated on room and call light use.  Will continue to monitor.

## 2022-08-17 ENCOUNTER — ANESTHESIA EVENT (OUTPATIENT)
Dept: PERIOP | Facility: HOSPITAL | Age: 72
End: 2022-08-17

## 2022-08-17 PROBLEM — Z96.659 STATUS POST KNEE REPLACEMENT, UNSPECIFIED LATERALITY: Status: ACTIVE | Noted: 2022-08-17

## 2022-08-17 LAB
INR PPP: 2.58 (ref 0.9–1.1)
PROTHROMBIN TIME: 27 SECONDS (ref 11.7–14.2)
SARS-COV-2 ORF1AB RESP QL NAA+PROBE: NOT DETECTED

## 2022-08-17 PROCEDURE — 25010000002 VANCOMYCIN 5 G RECONSTITUTED SOLUTION: Performed by: ORTHOPAEDIC SURGERY

## 2022-08-17 PROCEDURE — 85610 PROTHROMBIN TIME: CPT | Performed by: ORTHOPAEDIC SURGERY

## 2022-08-17 PROCEDURE — 99222 1ST HOSP IP/OBS MODERATE 55: CPT | Performed by: NURSE PRACTITIONER

## 2022-08-17 RX ORDER — PHYTONADIONE 5 MG/1
5 TABLET ORAL ONCE
Status: DISCONTINUED | OUTPATIENT
Start: 2022-08-17 | End: 2022-08-17

## 2022-08-17 RX ORDER — LISINOPRIL 20 MG/1
20 TABLET ORAL
Status: DISCONTINUED | OUTPATIENT
Start: 2022-08-17 | End: 2022-08-17

## 2022-08-17 RX ORDER — PHYTONADIONE 5 MG/1
5 TABLET ORAL ONCE
Status: COMPLETED | OUTPATIENT
Start: 2022-08-17 | End: 2022-08-17

## 2022-08-17 RX ORDER — VANCOMYCIN HYDROCHLORIDE 1 G/200ML
1000 INJECTION, SOLUTION INTRAVENOUS EVERY 24 HOURS
Status: DISCONTINUED | OUTPATIENT
Start: 2022-08-18 | End: 2022-08-19

## 2022-08-17 RX ADMIN — HYDROCODONE BITARTRATE AND ACETAMINOPHEN 1 TABLET: 7.5; 325 TABLET ORAL at 12:20

## 2022-08-17 RX ADMIN — HYDROCODONE BITARTRATE AND ACETAMINOPHEN 1 TABLET: 7.5; 325 TABLET ORAL at 18:13

## 2022-08-17 RX ADMIN — HYDROCODONE BITARTRATE AND ACETAMINOPHEN 1 TABLET: 7.5; 325 TABLET ORAL at 08:36

## 2022-08-17 RX ADMIN — Medication 1500 MG: at 00:00

## 2022-08-17 RX ADMIN — HYDROCODONE BITARTRATE AND ACETAMINOPHEN 1 TABLET: 7.5; 325 TABLET ORAL at 04:37

## 2022-08-17 RX ADMIN — METOPROLOL TARTRATE 50 MG: 50 TABLET, FILM COATED ORAL at 08:36

## 2022-08-17 RX ADMIN — HYDROCODONE BITARTRATE AND ACETAMINOPHEN 1 TABLET: 7.5; 325 TABLET ORAL at 22:34

## 2022-08-17 RX ADMIN — PHYTONADIONE 5 MG: 5 TABLET ORAL at 21:26

## 2022-08-17 NOTE — PLAN OF CARE
Goal Outcome Evaluation:           Progress: no change   Pt was a direct admit from Dr Hammonds office. Pt originally has a total rt knee done on July 15th. Pt now admitted for a possible infection to the knee. Pt was very frustrated when she first arrived because she did not have any orders. Rt kneeis red and swollen with some drainage noted. Wound cultured and sent to the lab. Pt continues with PO pain meds that provide relief. Pt educated on the importance of monitoring blood pressures related to comorbidity of HTN. Pt voiced understanding. Pt is resting at this time, will continue to monitor.

## 2022-08-17 NOTE — CONSULTS
Patient Name: Fe Arevalo  :1950  72 y.o.    Date of Admission: 2022  Date of Consultation:  22  Encounter Provider: AFIA Toussaint  Place of Service: [facility] CARDIOLOGY  Referring Provider: Nacho Hammonds*  Patient Care Team:  Dion Curry MD as PCP - General Gonzalez, Guillermo Epps MD as Consulting Physician (Cardiac Electrophysiology)      Chief complaint: s/p TKA with infection    Reason for Consult: cardiac clearance     History of Present Illness: Ms. Arevalo is a 72 year old woman who is followed by Dr. Gonzalez at [facility] for atrial fibrillation. She had several cancelled ablations due to suspected DANISHA thrombus despite adequate anticoagulation. She was eventually referred to [facility] with a CT performed demonstrating no evidence of a DANISHA thrombus, just sluggish flow, and she ultimately underwent successful ablation 22 with Dr. Guillermo Gonzalez and discharged on Coumadin. She later followed up in clinic 22 where she had maintained SR and reported significant symptom improvement. She also required cardiac clearance for an upcoming TKA at that time.     She later underwent a R TKA with Dr. Hammonds 7/15/22, but developed a postoperatively infection/hematoma, initially treated with oral antibiotics. She was on lovenox post operatively. She later follow up with Dr. Hammonds 8/15/22 where she continued to have drainage from her incision, and was directly admitted [facility] for [facility] antibiotics.     Would cultures taken were positive for staph.     Sodium 130. Creatinine 1.69 (creatinine in July 1.68 - however prior to that 1.14) . CRP 4.72. INR 2.70. Hgb 9.7.     Vitamin K has been ordered for surgical intervention planned for tomorrow.     She has no PND or orthopnea. No palpitations. No CP or pressure.    Previous Cardiac Testing:    REGIS 21  Normal LV structure and systolic function motion  abnormalities observed.   The estimated ejection fraction is 55-60%.   Moderately dilated left atrium.   Spontaneous echo contrast was present in left atrial cavity and left atrial appendage.   Moderate pedunculated mobile thrombus was visualized in LA appendage.     Patent foramen ovale demonstrated by color flow Doppler and agitated saline   There is moderate right atrial enlargement.    Mild to moderate mitral regurgitation is present.      Moderate to severe tricuspid regurgitation      Past Medical History:   Diagnosis Date   • A-fib (HCC)    • Histoplasmosis     STATES BILAT EYES:  INJECTIONS INTO HER EYES BILAT EVERY 7 WEEKS.  DENIES VISION LOSS   • History of blood clots 08/2021    PT STATES WAS DIAGNOSED WITH BLOOD CLOT BEHIND HER HEART AUG 2021-PT STATES THIS IS RESOLVED AND HAD CARDIAC ABLATION APRIL 2022   • Hypertension    • Lab test positive for detection of COVID-19 virus 02/2022   • Osteoarthritis of right knee    • Osteomyelitis (HCC) 2006    MRSA RIGHT FOOT   • PONV (postoperative nausea and vomiting)    • Right knee pain     WEAKNESS AND LIMITED MOBILITY       Past Surgical History:   Procedure Laterality Date   • CARDIAC ABLATION  04/29/2022    X2.  PT HAS ATRICLIP DANISHA EXCLUSION SYSTEM IN PLACE THAT PT STATES IS NOT WORKING   • CHOLECYSTECTOMY     • COLONOSCOPY     • FOOT SURGERY Right     x7 DT OSTEOMYELITIS   • HYSTERECTOMY  2004   • TOTAL KNEE ARTHROPLASTY Right 7/15/2022    Procedure: TOTAL KNEE ARTHROPLASTY WITH ROBOT;  Surgeon: Nacho Hammonds II, MD;  Location: [facility];  Service: Robotics - Ortho;  Laterality: Right;   •  SHUNT INSERTION           Prior to Admission medications    Medication Sig Start Date End Date Taking? Authorizing Provider   Chlorhexidine Gluconate 2 % pads Apply  topically. AS DIRECTED PAT    Herman Dye MD   Cholecalciferol (Vitamin D3) 50 MCG (2000 UT) capsule Take 2,000 Units by mouth Daily.    Provider, MD Herman   estradiol  (ESTRACE) 1 MG tablet Take 1 mg by mouth Daily. 6/30/17   Herman Dye MD   hydrALAZINE (APRESOLINE) 25 MG tablet Take 25 mg by mouth 2 (Two) Times a Day.    Herman Dye MD   HYDROcodone-acetaminophen (NORCO) 7.5-325 MG per tablet Take 1 tablet by mouth Every 6 (Six) Hours As Needed. STATES TAKING THREE TIMES DAILY 8/7/17   Herman Dye MD   lisinopril-hydrochlorothiazide (PRINZIDE,ZESTORETIC) 20-12.5 MG per tablet Take 1 tablet by mouth Daily.    Herman Dye MD   meclizine (ANTIVERT) 25 MG tablet Take 25 mg by mouth Daily. MAY TAKE DURING THE DAY IF NEEDED    Herman Dye MD   metoprolol tartrate (LOPRESSOR) 50 MG tablet Take 50 mg by mouth 2 (Two) Times a Day.    Herman Dye MD   multivitamin with minerals tablet tablet Take 1 tablet by mouth Daily. HOLDING FOR SURGERY    Herman Dye MD   mupirocin (BACTROBAN) 2 % ointment Apply 1 application topically to the appropriate area as directed 3 (Three) Times a Day. AS DIRECTED PAT    Herman Dye MD   NON FORMULARY 1 dose Every Night. TOPICAL CREAM-USES AT NIGHT FOR RIGHT KNEE PAIN, STATES THIS ESSENTIAL OILS  HOLD FOR SURGERY    Herman Dye MD   oxyCODONE-acetaminophen (PERCOCET) 5-325 MG per tablet Take 1 tablet by mouth Every 4 (Four) Hours As Needed for Severe Pain . 7/16/22   Nacho Hammonds II, MD   warfarin (COUMADIN) 5 MG tablet Take 5 mg by mouth Daily. WEDNESDAY AND SUNDAY    Herman Dye MD   warfarin (COUMADIN) 5 MG tablet Take 7.5 mg by mouth Daily. ALL DAYS EXCEPT WEDNESDAY AND SUNDAY    Herman Dye MD       Allergies   Allergen Reactions   • Keflex [Cephalexin] Hives   • Pantoprazole Anaphylaxis and Angioedema   • Morphine And Related Other (See Comments) and Palpitations     Pt does not like the way it makes her feel.  Does not want it..     • Adhesive Tape Other (See Comments)     BLISTER   • Benadryl [Diphenhydramine] Palpitations   •  Ciprofloxacin Rash   • Codeine Other (See Comments)     CHEST PAIN       Social History     Socioeconomic History   • Marital status:    Tobacco Use   • Smoking status: Former Smoker     Packs/day: 0.50     Years: 10.00     Pack years: 5.00     Types: Cigarettes     Quit date:      Years since quittin.6   • Smokeless tobacco: Never Used   • Tobacco comment: SOCIALLY SMOKED   Vaping Use   • Vaping Use: Never used   Substance and Sexual Activity   • Alcohol use: Yes     Comment: RARELY   • Drug use: Never   • Sexual activity: Defer       Family History   Problem Relation Age of Onset   • Heart disease Mother    • Lung disease Father    • Emphysema Father    • Malig Hyperthermia Neg Hx        REVIEW OF SYSTEMS:   All systems reviewed.  Pertinent positives identified in HPI.  All other systems are negative.    I have reviewed and confirmed the accuracy of the ROS as documented by the MA/CIARA/RN AFIA Toussaint    Constitutional: She is oriented to person, place, and time. She appears well-developed. She does not appear ill.   HENT:   Head: Normocephalic and atraumatic. Head is without contusion.   Right Ear: Hearing normal. No drainage.   Left Ear: Hearing normal. No drainage.   Nose: No nasal deformity. No epistaxis.   Eyes: Lids are normal. Right eye exhibits no exudate. Left eye exhibits no exudate.  Neck: No JVD present.   Cardiovascular: Normal rate, regular rhythm and normal heart sounds.    Pulses:       Posterior tibial pulses are 2+ on the right side, and 2+ on the left side.   Pulmonary/Chest: Effort normal and breath sounds normal.   Abdominal: Soft. Normal appearance and bowel sounds are normal. There is no tenderness.   Musculoskeletal: Normal range of motion.        Right shoulder: She exhibits no deformity.        Left shoulder: She exhibits no deformity.   Neurological: She is alert and oriented to person, place, and time. She has normal strength.   Skin: Skin is warm, dry and  intact. No rash noted.   Psychiatric: She has a normal mood and affect. Her behavior is normal. Thought content normal.   Vitals reviewed      Objective:     Vitals:    08/16/22 2251 08/17/22 0309 08/17/22 0700 08/17/22 1100   BP: 119/56 120/55 112/66 92/43   BP Location: Left arm Left arm Left arm Left arm   Patient Position: Lying Lying Lying Lying   Pulse: 83 71 74 55   Resp: 18 18 16 16   Temp: 98.3 °F (36.8 °C) 98.3 °F (36.8 °C) 97 °F (36.1 °C) 97 °F (36.1 °C)   TempSrc: Oral Oral Oral Oral   SpO2: 99% 100% 99% 96%   Weight:       Height:         Body mass index is 33.99 kg/m².    Lab Review:     Results from last 7 days   Lab Units 08/16/22  1630   SODIUM mmol/L 130*   POTASSIUM mmol/L 4.0   CHLORIDE mmol/L 96*   CO2 mmol/L 23.9   BUN mg/dL 41*   CREATININE mg/dL 1.69*   CALCIUM mg/dL 10.8*   GLUCOSE mg/dL 108*         Results from last 7 days   Lab Units 08/16/22  1630   WBC 10*3/mm3 8.05   HEMOGLOBIN g/dL 9.7*   HEMATOCRIT % 29.4*   PLATELETS 10*3/mm3 314     Results from last 7 days   Lab Units 08/16/22  1630   INR  2.70*                        Previous EKG 7/6/16        Assessment and Plan:      1. Wound infection, recent right total knee replacement - for I&D tomorrow  2. Paroxysmal atrial fibrillation  3. Hypertension  4. Renal insufficiency   5. Hyponatremia   6. Mild to moderate mitral valve regurgitation  7. Moderate to severe tricuspid valve regurgitation  8. Chronic anticoagulation INR therapeutic.     She has a history of suspected DANISHA thrombus on REGIS. Sent to [facility] and cardiac CT just showed sluggish flow, no clot. No history of stroke. She had AF ablation in April.    Vitamin K IV ordered. Prefer oral. Will change. Repeat INR this evening and can repeat oral dose if INR remains above 2. Discussed with Dr. Ramires.     She is hypotensive and sodium low. She also has some renal insufficiency that appears new in July. She has never been told she has any kidney problems. Will stop  lisinopril/HCTZ. Repeat labs tomorrow.       Guadalupe Vizcaino, AFIA  08/17/22  15:04 EDT

## 2022-08-17 NOTE — PROGRESS NOTES
"[facility] Clinical Pharmacy Services: Vancomycin Pharmacokinetic Initial Consult Note    Fe Arevalo is a 72 y.o. female who is on day 1 of pharmacy to dose vancomycin.    Indication: Surgical Prophylaxis  Consulting Provider: Cassius  Planned Duration of Therapy: 4 days  Loading Dose Ordered or Given: 1500 mg on 8/17 at 0000  Target: -600 mg/L.hr   Other Antimicrobials: none    Vitals/Labs  Ht: 175.3 cm (69\"); Wt: 104 kg (230 lb 2.6 oz)  Temp Readings from Last 1 Encounters:   08/17/22 97 °F (36.1 °C) (Oral)    Estimated Creatinine Clearance: 39.8 mL/min (A) (by C-G formula based on SCr of 1.69 mg/dL (H)).    Results from last 7 days   Lab Units 08/16/22  1630   CREATININE mg/dL 1.69*   WBC 10*3/mm3 8.05     Assessment/Plan:    Vancomycin Dose: 1000 mg IV every  24  hours  Predictive AUC level for the dose ordered is 429 mg/L.hr, which is within the target of 400-600 mg/L.hr  Vanc Trough has been ordered for 8/19/22 at 1530     Pharmacy will follow patient's kidney function and will adjust doses and obtain levels as necessary. Thank you for involving pharmacy in this patient's care. Please contact pharmacy with any questions or concerns.                           Jensen De Jesus, [facility]  Clinical Pharmacist   "

## 2022-08-17 NOTE — H&P
Orthopaedic Surgery  History & Physical  Dr. SHIRA Hammonds II  [phone]    HPI:  Patient is a 72 y.o. Not  or  female who presents with concerns of the right knee infection.  This patient underwent right total knee arthroplasty 1 month ago.  She has had problems with superficial wound healing.  She has been put on oral antibiotics.  Due to some foul appearance of the wound is sent to the hospital from clinic yesterday.  She was placed on IV vancomycin.  A wound culture was taken that was positive for staph, sensitivities pending.  CRP and sedimentation rate was moderately elevated.  Patient with no deep knee pain and still has the ability to bend and extend the knee freely.  Only minimal amount of drainage noted.    MEDICAL HISTORY  Past Medical History:   Diagnosis Date   • A-fib (HCC)    • Histoplasmosis     STATES BILAT EYES:  INJECTIONS INTO HER EYES BILAT EVERY 7 WEEKS.  DENIES VISION LOSS   • History of blood clots 08/2021    PT STATES WAS DIAGNOSED WITH BLOOD CLOT BEHIND HER HEART AUG 2021-PT STATES THIS IS RESOLVED AND HAD CARDIAC ABLATION APRIL 2022   • Hypertension    • Lab test positive for detection of COVID-19 virus 02/2022   • Osteoarthritis of right knee    • Osteomyelitis (HCC) 2006    MRSA RIGHT FOOT   • PONV (postoperative nausea and vomiting)    • Right knee pain     WEAKNESS AND LIMITED MOBILITY   ·   Past Surgical History:   Procedure Laterality Date   • CARDIAC ABLATION  04/29/2022    X2.  PT HAS ATRICLIP DANISHA EXCLUSION SYSTEM IN PLACE THAT PT STATES IS NOT WORKING   • CHOLECYSTECTOMY     • COLONOSCOPY     • FOOT SURGERY Right     x7 DT OSTEOMYELITIS   • HYSTERECTOMY  2004   • TOTAL KNEE ARTHROPLASTY Right 7/15/2022    Procedure: TOTAL KNEE ARTHROPLASTY WITH ROBOT;  Surgeon: Nacho Hammonds II, MD;  Location: [facility] OR [facility] [place];  Service: Robotics - Ortho;  Laterality: Right;   •  SHUNT INSERTION     ·   Prior to Admission medications    Medication Sig Start Date End  Date Taking? Authorizing Provider   Chlorhexidine Gluconate 2 % pads Apply  topically. AS DIRECTED PAT    Herman Dye MD   Cholecalciferol (Vitamin D3) 50 MCG (2000 UT) capsule Take 2,000 Units by mouth Daily.    Herman Dye MD   estradiol (ESTRACE) 1 MG tablet Take 1 mg by mouth Daily. 6/30/17   Herman Dye MD   hydrALAZINE (APRESOLINE) 25 MG tablet Take 25 mg by mouth 2 (Two) Times a Day.    Herman Dye MD   HYDROcodone-acetaminophen (NORCO) 7.5-325 MG per tablet Take 1 tablet by mouth Every 6 (Six) Hours As Needed. STATES TAKING THREE TIMES DAILY 8/7/17   Herman Dye MD   lisinopril-hydrochlorothiazide (PRINZIDE,ZESTORETIC) 20-12.5 MG per tablet Take 1 tablet by mouth Daily.    Herman Dye MD   meclizine (ANTIVERT) 25 MG tablet Take 25 mg by mouth Daily. MAY TAKE DURING THE DAY IF NEEDED    Herman Dye MD   metoprolol tartrate (LOPRESSOR) 50 MG tablet Take 50 mg by mouth 2 (Two) Times a Day.    Herman Dye MD   multivitamin with minerals tablet tablet Take 1 tablet by mouth Daily. HOLDING FOR SURGERY    Herman Dye MD   mupirocin (BACTROBAN) 2 % ointment Apply 1 application topically to the appropriate area as directed 3 (Three) Times a Day. AS DIRECTED PAT    Herman Dye MD   NON FORMULARY 1 dose Every Night. TOPICAL CREAM-USES AT NIGHT FOR RIGHT KNEE PAIN, STATES THIS ESSENTIAL OILS  HOLD FOR SURGERY    Herman Dye MD   oxyCODONE-acetaminophen (PERCOCET) 5-325 MG per tablet Take 1 tablet by mouth Every 4 (Four) Hours As Needed for Severe Pain . 7/16/22   Nacho Hammonds II, MD   warfarin (COUMADIN) 5 MG tablet Take 5 mg by mouth Daily. WEDNESDAY AND SUNDAY    Herman Dye MD   warfarin (COUMADIN) 5 MG tablet Take 7.5 mg by mouth Daily. ALL DAYS EXCEPT WEDNESDAY AND SUNDAY    Herman Dye MD   ·   Allergies   Allergen Reactions   • Keflex [Cephalexin] Hives   • Pantoprazole  "Anaphylaxis and Angioedema   • Morphine And Related Other (See Comments) and Palpitations     Pt does not like the way it makes her feel.  Does not want it..     • Adhesive Tape Other (See Comments)     BLISTER   • Benadryl [Diphenhydramine] Palpitations   • Ciprofloxacin Rash   • Codeine Other (See Comments)     CHEST PAIN   ·   Most Recent Immunizations   Administered Date(s) Administered   • COVID-19 (MODERNA) 1st, 2nd, 3rd Dose Only 2021   • COVID-19 (MODERNA) BOOSTER 11/10/2021   • Pneumococcal Polysaccharide (PPSV23) 2022   • Tdap 2022   ·   Social History     Tobacco Use   • Smoking status: Former Smoker     Packs/day: 0.50     Years: 10.00     Pack years: 5.00     Types: Cigarettes     Quit date:      Years since quittin.6   • Smokeless tobacco: Never Used   • Tobacco comment: SOCIALLY SMOKED   Substance Use Topics   • Alcohol use: Yes     Comment: RARELY   ·    Social History     Substance and Sexual Activity   Drug Use Never   ·     REVIEW OF SYSTEMS:  · Head: negative for headache  · Respiratory: negative for shortness of breath.   · Cardiovascular: negative for chest pain.   · Gastrointestinal: negative abdominal pain.   · Neurological: negative for LOC  · Psychiatric/Behavioral: negative for memory loss.   · All other systems reviewed and are negative    VITALS: /55 (BP Location: Left arm, Patient Position: Lying)   Pulse 71   Temp 98.3 °F (36.8 °C) (Oral)   Resp 18   Ht 175.3 cm (69\")   Wt 104 kg (230 lb 2.6 oz)   SpO2 100%   BMI 33.99 kg/m²  Body mass index is 33.99 kg/m².    PHYSICAL EXAM:   · CONSTITUTIONAL: A&Ox3, No acute distress  · LUNGS: Equal chest rise, no shortness of air  · CARDIOVASCULAR: palpable peripheral pulses  · SKIN: no skin lesions in the area examined  · LYMPH: no lymphadenopathy in the area examined  · EXTREMITY: Right Lower Extremity  · Tenderness to Palpation: Tenderness to palpation at the Knee anteriorly  · Gross Deformity: There is a " large prepatellar swelling and hematoma with wound breakdown anteriorly extending up to 2 cm  · Pulses:  Brisk Capillary Refill  · Sensation: Intact to Saphenous, Sural, Deep Peroneal, Superficial Peroneal, and Tibial Nerves and grossly throughout extremity  · Motor: 5/5 EHL/FHL/TA/GS motor complexes    RADIOLOGY REVIEW:   No radiology results for the last 7 days    LABS:   Results for the past 24 hours:   Recent Results (from the past 24 hour(s))   Protime-INR    Collection Time: 08/16/22  4:30 PM    Specimen: Blood   Result Value Ref Range    Protime 28.0 (H) 11.7 - 14.2 Seconds    INR 2.70 (H) 0.90 - 1.10   Basic Metabolic Panel    Collection Time: 08/16/22  4:30 PM    Specimen: Blood   Result Value Ref Range    Glucose 108 (H) 65 - 99 mg/dL    BUN 41 (H) 8 - 23 mg/dL    Creatinine 1.69 (H) 0.57 - 1.00 mg/dL    Sodium 130 (L) 136 - 145 mmol/L    Potassium 4.0 3.5 - 5.2 mmol/L    Chloride 96 (L) 98 - 107 mmol/L    CO2 23.9 22.0 - 29.0 mmol/L    Calcium 10.8 (H) 8.6 - 10.5 mg/dL    BUN/Creatinine Ratio 24.3 7.0 - 25.0    Anion Gap 10.1 5.0 - 15.0 mmol/L    eGFR 32.4 (L) >60.0 mL/min/1.73   C-reactive Protein    Collection Time: 08/16/22  4:30 PM    Specimen: Blood   Result Value Ref Range    C-Reactive Protein 4.72 (H) 0.00 - 0.50 mg/dL   Sedimentation Rate    Collection Time: 08/16/22  4:30 PM    Specimen: Blood   Result Value Ref Range    Sed Rate 48 (H) 0 - 30 mm/hr   CBC Auto Differential    Collection Time: 08/16/22  4:30 PM    Specimen: Blood   Result Value Ref Range    WBC 8.05 3.40 - 10.80 10*3/mm3    RBC 3.19 (L) 3.77 - 5.28 10*6/mm3    Hemoglobin 9.7 (L) 12.0 - 15.9 g/dL    Hematocrit 29.4 (L) 34.0 - 46.6 %    MCV 92.2 79.0 - 97.0 fL    MCH 30.4 26.6 - 33.0 pg    MCHC 33.0 31.5 - 35.7 g/dL    RDW 12.3 12.3 - 15.4 %    RDW-SD 40.6 37.0 - 54.0 fl    MPV 10.3 6.0 - 12.0 fL    Platelets 314 140 - 450 10*3/mm3    Neutrophil % 77.1 (H) 42.7 - 76.0 %    Lymphocyte % 9.4 (L) 19.6 - 45.3 %    Monocyte % 10.1 5.0 -  "12.0 %    Eosinophil % 2.4 0.3 - 6.2 %    Basophil % 0.6 0.0 - 1.5 %    Immature Grans % 0.4 0.0 - 0.5 %    Neutrophils, Absolute 6.21 1.70 - 7.00 10*3/mm3    Lymphocytes, Absolute 0.76 0.70 - 3.10 10*3/mm3    Monocytes, Absolute 0.81 0.10 - 0.90 10*3/mm3    Eosinophils, Absolute 0.19 0.00 - 0.40 10*3/mm3    Basophils, Absolute 0.05 0.00 - 0.20 10*3/mm3    Immature Grans, Absolute 0.03 0.00 - 0.05 10*3/mm3    nRBC 0.0 0.0 - 0.2 /100 WBC   Wound Culture - Wound, Knee    Collection Time: 08/16/22  7:40 PM    Specimen: Knee; Wound   Result Value Ref Range    Gram Stain Rare (1+) WBCs per low power field     Gram Stain Rare (1+) Gram positive cocci     Gram Stain Occasional Gram variable bacilli    COVID-19,APTIMA PANTHER(ENDER),[facility], NP/OP SWAB IN UTM/VTM/SALINE TRANSPORT MEDIA,24 HR TAT - Swab, Nasopharynx    Collection Time: 08/16/22  7:40 PM    Specimen: Nasopharynx; Swab   Result Value Ref Range    COVID19 Not Detected Not Detected - Ref. Range       IMPRESSION:  Patient is a 72 y.o. Not  or  female with concern for right knee infection    PLAN:   · Admited to: Nacho Hammonds II, MD   · Disposition: I think based on the appearance of the wound she will require irrigation and debridement.  If the wound is deep, I may consider an arthrotomy with polyethylene exchange.  This has the appearance of more of a superficial wound, but I cannot know that for sure until getting into the knee.  I have tried to add her onto the surgery schedule today, but I have not been told whether or not there will be time available.  That is still pending.  If not, I will get her on for surgery tomorrow morning at 7 AM.  She will be kept n.p.o. for now just in case surgery time becomes available, I should know before lunchtime.  We will have her skip breakfast today    R \"Stevan\" Cassius DAVIES MD  Orthopaedic Surgery  [facility]  [phone] - [facility]  [phone] - [facility]" Office

## 2022-08-18 ENCOUNTER — ANESTHESIA (OUTPATIENT)
Dept: PERIOP | Facility: HOSPITAL | Age: 72
End: 2022-08-18

## 2022-08-18 LAB
INR PPP: 1.83 (ref 0.9–1.1)
INR PPP: 2.36 (ref 0.9–1.1)
PROTHROMBIN TIME: 20.8 SECONDS (ref 11.7–14.2)
PROTHROMBIN TIME: 25.2 SECONDS (ref 11.7–14.2)

## 2022-08-18 PROCEDURE — 25010000002 NEOSTIGMINE 5 MG/10ML SOLUTION

## 2022-08-18 PROCEDURE — 25010000002 FENTANYL CITRATE (PF) 50 MCG/ML SOLUTION

## 2022-08-18 PROCEDURE — 99231 SBSQ HOSP IP/OBS SF/LOW 25: CPT | Performed by: INTERNAL MEDICINE

## 2022-08-18 PROCEDURE — 25010000002 HYDROMORPHONE PER 4 MG

## 2022-08-18 PROCEDURE — 25010000002 PROPOFOL 10 MG/ML EMULSION

## 2022-08-18 PROCEDURE — 25010000002 ONDANSETRON PER 1 MG

## 2022-08-18 PROCEDURE — 0SPC09Z REMOVAL OF LINER FROM RIGHT KNEE JOINT, OPEN APPROACH: ICD-10-PCS | Performed by: ORTHOPAEDIC SURGERY

## 2022-08-18 PROCEDURE — 25010000002 DEXAMETHASONE PER 1 MG

## 2022-08-18 PROCEDURE — 85610 PROTHROMBIN TIME: CPT | Performed by: ORTHOPAEDIC SURGERY

## 2022-08-18 PROCEDURE — 25010000002 VANCOMYCIN 1 G RECONSTITUTED SOLUTION: Performed by: ORTHOPAEDIC SURGERY

## 2022-08-18 PROCEDURE — 87077 CULTURE AEROBIC IDENTIFY: CPT | Performed by: ORTHOPAEDIC SURGERY

## 2022-08-18 PROCEDURE — C1776 JOINT DEVICE (IMPLANTABLE): HCPCS | Performed by: ORTHOPAEDIC SURGERY

## 2022-08-18 PROCEDURE — 0SBC0ZZ EXCISION OF RIGHT KNEE JOINT, OPEN APPROACH: ICD-10-PCS | Performed by: ORTHOPAEDIC SURGERY

## 2022-08-18 PROCEDURE — 25010000002 VANCOMYCIN PER 500 MG: Performed by: ORTHOPAEDIC SURGERY

## 2022-08-18 PROCEDURE — 0SUV09Z SUPPLEMENT RIGHT KNEE JOINT, TIBIAL SURFACE WITH LINER, OPEN APPROACH: ICD-10-PCS | Performed by: ORTHOPAEDIC SURGERY

## 2022-08-18 PROCEDURE — 87205 SMEAR GRAM STAIN: CPT | Performed by: ORTHOPAEDIC SURGERY

## 2022-08-18 PROCEDURE — 3E1U38Z IRRIGATION OF JOINTS USING IRRIGATING SUBSTANCE, PERCUTANEOUS APPROACH: ICD-10-PCS | Performed by: ORTHOPAEDIC SURGERY

## 2022-08-18 PROCEDURE — 25010000002 DIPHENHYDRAMINE PER 50 MG

## 2022-08-18 PROCEDURE — 63710000001 PROMETHAZINE PER 12.5 MG: Performed by: ANESTHESIOLOGY

## 2022-08-18 PROCEDURE — 87070 CULTURE OTHR SPECIMN AEROBIC: CPT | Performed by: ORTHOPAEDIC SURGERY

## 2022-08-18 DEVICE — KNOTLESS TISSUE CONTROL DEVICE, UNDYED UNIDIRECTIONAL (ANTIBACTERIAL) SYNTHETIC ABSORBABLE DEVICE
Type: IMPLANTABLE DEVICE | Site: KNEE | Status: FUNCTIONAL
Brand: STRATAFIX

## 2022-08-18 DEVICE — KNOTLESS TISSUE CONTROL DEVICE, VIOLET UNIDIRECTIONAL (ANTIBACTERIAL) SYNTHETIC ABSORBABLE DEVICE
Type: IMPLANTABLE DEVICE | Site: KNEE | Status: FUNCTIONAL
Brand: STRATAFIX

## 2022-08-18 DEVICE — JOURNEY II BCS XLPE ARTICULAR                                    INSERT SIZE 3-4 RIGHT 11MM
Type: IMPLANTABLE DEVICE | Site: KNEE | Status: FUNCTIONAL
Brand: JOURNEY

## 2022-08-18 RX ORDER — LIDOCAINE HYDROCHLORIDE 20 MG/ML
INJECTION, SOLUTION INFILTRATION; PERINEURAL AS NEEDED
Status: DISCONTINUED | OUTPATIENT
Start: 2022-08-18 | End: 2022-08-18 | Stop reason: SURG

## 2022-08-18 RX ORDER — FAMOTIDINE 10 MG/ML
20 INJECTION, SOLUTION INTRAVENOUS ONCE
Status: COMPLETED | OUTPATIENT
Start: 2022-08-18 | End: 2022-08-18

## 2022-08-18 RX ORDER — HYDRALAZINE HYDROCHLORIDE 20 MG/ML
5 INJECTION INTRAMUSCULAR; INTRAVENOUS
Status: DISCONTINUED | OUTPATIENT
Start: 2022-08-18 | End: 2022-08-18 | Stop reason: HOSPADM

## 2022-08-18 RX ORDER — GLYCOPYRROLATE 0.2 MG/ML
INJECTION INTRAMUSCULAR; INTRAVENOUS AS NEEDED
Status: DISCONTINUED | OUTPATIENT
Start: 2022-08-18 | End: 2022-08-18 | Stop reason: SURG

## 2022-08-18 RX ORDER — FLUMAZENIL 0.1 MG/ML
0.2 INJECTION INTRAVENOUS AS NEEDED
Status: DISCONTINUED | OUTPATIENT
Start: 2022-08-18 | End: 2022-08-18 | Stop reason: HOSPADM

## 2022-08-18 RX ORDER — DIPHENHYDRAMINE HYDROCHLORIDE 50 MG/ML
INJECTION INTRAMUSCULAR; INTRAVENOUS AS NEEDED
Status: DISCONTINUED | OUTPATIENT
Start: 2022-08-18 | End: 2022-08-18 | Stop reason: SURG

## 2022-08-18 RX ORDER — SODIUM CHLORIDE 0.9 % (FLUSH) 0.9 %
10 SYRINGE (ML) INJECTION AS NEEDED
Status: DISCONTINUED | OUTPATIENT
Start: 2022-08-18 | End: 2022-08-18 | Stop reason: HOSPADM

## 2022-08-18 RX ORDER — PROMETHAZINE HYDROCHLORIDE 12.5 MG/1
12.5 TABLET ORAL ONCE
Status: COMPLETED | OUTPATIENT
Start: 2022-08-18 | End: 2022-08-18

## 2022-08-18 RX ORDER — ONDANSETRON 2 MG/ML
4 INJECTION INTRAMUSCULAR; INTRAVENOUS ONCE AS NEEDED
Status: DISCONTINUED | OUTPATIENT
Start: 2022-08-18 | End: 2022-08-18 | Stop reason: HOSPADM

## 2022-08-18 RX ORDER — HYDROMORPHONE HYDROCHLORIDE 1 MG/ML
0.5 INJECTION, SOLUTION INTRAMUSCULAR; INTRAVENOUS; SUBCUTANEOUS
Status: DISCONTINUED | OUTPATIENT
Start: 2022-08-18 | End: 2022-08-18 | Stop reason: HOSPADM

## 2022-08-18 RX ORDER — PROMETHAZINE HYDROCHLORIDE 25 MG/1
25 SUPPOSITORY RECTAL ONCE AS NEEDED
Status: DISCONTINUED | OUTPATIENT
Start: 2022-08-18 | End: 2022-08-18 | Stop reason: HOSPADM

## 2022-08-18 RX ORDER — MAGNESIUM HYDROXIDE 1200 MG/15ML
LIQUID ORAL AS NEEDED
Status: DISCONTINUED | OUTPATIENT
Start: 2022-08-18 | End: 2022-08-18 | Stop reason: HOSPADM

## 2022-08-18 RX ORDER — DIPHENHYDRAMINE HCL 25 MG
25 CAPSULE ORAL
Status: DISCONTINUED | OUTPATIENT
Start: 2022-08-18 | End: 2022-08-18 | Stop reason: HOSPADM

## 2022-08-18 RX ORDER — LABETALOL HYDROCHLORIDE 5 MG/ML
5 INJECTION, SOLUTION INTRAVENOUS
Status: DISCONTINUED | OUTPATIENT
Start: 2022-08-18 | End: 2022-08-18 | Stop reason: HOSPADM

## 2022-08-18 RX ORDER — SODIUM CHLORIDE 0.9 % (FLUSH) 0.9 %
3-10 SYRINGE (ML) INJECTION AS NEEDED
Status: DISCONTINUED | OUTPATIENT
Start: 2022-08-18 | End: 2022-08-18 | Stop reason: HOSPADM

## 2022-08-18 RX ORDER — SODIUM CHLORIDE 9 MG/ML
INJECTION, SOLUTION INTRAVENOUS AS NEEDED
Status: DISCONTINUED | OUTPATIENT
Start: 2022-08-18 | End: 2022-08-18 | Stop reason: HOSPADM

## 2022-08-18 RX ORDER — DIPHENHYDRAMINE HYDROCHLORIDE 50 MG/ML
12.5 INJECTION INTRAMUSCULAR; INTRAVENOUS
Status: DISCONTINUED | OUTPATIENT
Start: 2022-08-18 | End: 2022-08-18 | Stop reason: HOSPADM

## 2022-08-18 RX ORDER — MIDAZOLAM HYDROCHLORIDE 1 MG/ML
0.5 INJECTION INTRAMUSCULAR; INTRAVENOUS
Status: DISCONTINUED | OUTPATIENT
Start: 2022-08-18 | End: 2022-08-18 | Stop reason: HOSPADM

## 2022-08-18 RX ORDER — HYDROCODONE BITARTRATE AND ACETAMINOPHEN 7.5; 325 MG/1; MG/1
2 TABLET ORAL EVERY 4 HOURS PRN
Status: DISCONTINUED | OUTPATIENT
Start: 2022-08-18 | End: 2022-08-19

## 2022-08-18 RX ORDER — METOPROLOL TARTRATE 50 MG/1
50 TABLET, FILM COATED ORAL ONCE
Status: COMPLETED | OUTPATIENT
Start: 2022-08-18 | End: 2022-08-18

## 2022-08-18 RX ORDER — SODIUM CHLORIDE, SODIUM LACTATE, POTASSIUM CHLORIDE, CALCIUM CHLORIDE 600; 310; 30; 20 MG/100ML; MG/100ML; MG/100ML; MG/100ML
9 INJECTION, SOLUTION INTRAVENOUS CONTINUOUS
Status: DISCONTINUED | OUTPATIENT
Start: 2022-08-18 | End: 2022-08-22 | Stop reason: HOSPADM

## 2022-08-18 RX ORDER — WARFARIN SODIUM 5 MG/1
5 TABLET ORAL
Status: DISCONTINUED | OUTPATIENT
Start: 2022-08-21 | End: 2022-08-21

## 2022-08-18 RX ORDER — ALBUTEROL SULFATE 2.5 MG/3ML
2.5 SOLUTION RESPIRATORY (INHALATION) ONCE AS NEEDED
Status: DISCONTINUED | OUTPATIENT
Start: 2022-08-18 | End: 2022-08-18 | Stop reason: HOSPADM

## 2022-08-18 RX ORDER — HYDROCODONE BITARTRATE AND ACETAMINOPHEN 7.5; 325 MG/1; MG/1
1 TABLET ORAL ONCE AS NEEDED
Status: DISCONTINUED | OUTPATIENT
Start: 2022-08-18 | End: 2022-08-18 | Stop reason: HOSPADM

## 2022-08-18 RX ORDER — VANCOMYCIN HYDROCHLORIDE 1 G/20ML
INJECTION, POWDER, LYOPHILIZED, FOR SOLUTION INTRAVENOUS AS NEEDED
Status: DISCONTINUED | OUTPATIENT
Start: 2022-08-18 | End: 2022-08-18 | Stop reason: HOSPADM

## 2022-08-18 RX ORDER — NEOSTIGMINE METHYLSULFATE 0.5 MG/ML
INJECTION, SOLUTION INTRAVENOUS AS NEEDED
Status: DISCONTINUED | OUTPATIENT
Start: 2022-08-18 | End: 2022-08-18 | Stop reason: SURG

## 2022-08-18 RX ORDER — WARFARIN SODIUM 5 MG/1
7.5 TABLET ORAL
Status: DISCONTINUED | OUTPATIENT
Start: 2022-08-18 | End: 2022-08-20

## 2022-08-18 RX ORDER — ACETAMINOPHEN 500 MG
500 TABLET ORAL ONCE
Status: COMPLETED | OUTPATIENT
Start: 2022-08-18 | End: 2022-08-18

## 2022-08-18 RX ORDER — ROCURONIUM BROMIDE 10 MG/ML
INJECTION, SOLUTION INTRAVENOUS AS NEEDED
Status: DISCONTINUED | OUTPATIENT
Start: 2022-08-18 | End: 2022-08-18 | Stop reason: SURG

## 2022-08-18 RX ORDER — NALOXONE HCL 0.4 MG/ML
0.2 VIAL (ML) INJECTION AS NEEDED
Status: DISCONTINUED | OUTPATIENT
Start: 2022-08-18 | End: 2022-08-18 | Stop reason: HOSPADM

## 2022-08-18 RX ORDER — SODIUM CHLORIDE, SODIUM LACTATE, POTASSIUM CHLORIDE, CALCIUM CHLORIDE 600; 310; 30; 20 MG/100ML; MG/100ML; MG/100ML; MG/100ML
9 INJECTION, SOLUTION INTRAVENOUS CONTINUOUS PRN
Status: DISCONTINUED | OUTPATIENT
Start: 2022-08-18 | End: 2022-08-18 | Stop reason: HOSPADM

## 2022-08-18 RX ORDER — ONDANSETRON 2 MG/ML
INJECTION INTRAMUSCULAR; INTRAVENOUS AS NEEDED
Status: DISCONTINUED | OUTPATIENT
Start: 2022-08-18 | End: 2022-08-18 | Stop reason: SURG

## 2022-08-18 RX ORDER — FENTANYL CITRATE 50 UG/ML
INJECTION, SOLUTION INTRAMUSCULAR; INTRAVENOUS AS NEEDED
Status: DISCONTINUED | OUTPATIENT
Start: 2022-08-18 | End: 2022-08-18 | Stop reason: SURG

## 2022-08-18 RX ORDER — IBUPROFEN 600 MG/1
600 TABLET ORAL ONCE AS NEEDED
Status: DISCONTINUED | OUTPATIENT
Start: 2022-08-18 | End: 2022-08-18 | Stop reason: HOSPADM

## 2022-08-18 RX ORDER — PROMETHAZINE HYDROCHLORIDE 25 MG/1
25 TABLET ORAL ONCE AS NEEDED
Status: DISCONTINUED | OUTPATIENT
Start: 2022-08-18 | End: 2022-08-18 | Stop reason: HOSPADM

## 2022-08-18 RX ORDER — PHYTONADIONE 5 MG/1
5 TABLET ORAL ONCE
Status: COMPLETED | OUTPATIENT
Start: 2022-08-18 | End: 2022-08-18

## 2022-08-18 RX ORDER — PROPOFOL 10 MG/ML
VIAL (ML) INTRAVENOUS AS NEEDED
Status: DISCONTINUED | OUTPATIENT
Start: 2022-08-18 | End: 2022-08-18 | Stop reason: SURG

## 2022-08-18 RX ORDER — SODIUM CHLORIDE 0.9 % (FLUSH) 0.9 %
3 SYRINGE (ML) INJECTION EVERY 12 HOURS SCHEDULED
Status: DISCONTINUED | OUTPATIENT
Start: 2022-08-18 | End: 2022-08-18 | Stop reason: HOSPADM

## 2022-08-18 RX ORDER — TRANEXAMIC ACID 100 MG/ML
INJECTION, SOLUTION INTRAVENOUS AS NEEDED
Status: DISCONTINUED | OUTPATIENT
Start: 2022-08-18 | End: 2022-08-18 | Stop reason: SURG

## 2022-08-18 RX ORDER — EPHEDRINE SULFATE 50 MG/ML
5 INJECTION, SOLUTION INTRAVENOUS ONCE AS NEEDED
Status: DISCONTINUED | OUTPATIENT
Start: 2022-08-18 | End: 2022-08-18 | Stop reason: HOSPADM

## 2022-08-18 RX ORDER — OXYCODONE AND ACETAMINOPHEN 7.5; 325 MG/1; MG/1
1 TABLET ORAL EVERY 4 HOURS PRN
Status: DISCONTINUED | OUTPATIENT
Start: 2022-08-18 | End: 2022-08-18 | Stop reason: HOSPADM

## 2022-08-18 RX ORDER — DEXAMETHASONE SODIUM PHOSPHATE 10 MG/ML
INJECTION INTRAMUSCULAR; INTRAVENOUS AS NEEDED
Status: DISCONTINUED | OUTPATIENT
Start: 2022-08-18 | End: 2022-08-18 | Stop reason: SURG

## 2022-08-18 RX ORDER — SODIUM CHLORIDE 0.9 % (FLUSH) 0.9 %
10 SYRINGE (ML) INJECTION EVERY 12 HOURS SCHEDULED
Status: DISCONTINUED | OUTPATIENT
Start: 2022-08-18 | End: 2022-08-18 | Stop reason: HOSPADM

## 2022-08-18 RX ORDER — FENTANYL CITRATE 50 UG/ML
50 INJECTION, SOLUTION INTRAMUSCULAR; INTRAVENOUS
Status: DISCONTINUED | OUTPATIENT
Start: 2022-08-18 | End: 2022-08-18 | Stop reason: HOSPADM

## 2022-08-18 RX ADMIN — METOPROLOL TARTRATE 50 MG: 50 TABLET, FILM COATED ORAL at 06:29

## 2022-08-18 RX ADMIN — LIDOCAINE HYDROCHLORIDE 100 MG: 20 INJECTION, SOLUTION INFILTRATION; PERINEURAL at 07:02

## 2022-08-18 RX ADMIN — HYDROCODONE BITARTRATE AND ACETAMINOPHEN 1 TABLET: 7.5; 325 TABLET ORAL at 14:34

## 2022-08-18 RX ADMIN — FENTANYL CITRATE 50 MCG: 50 INJECTION INTRAMUSCULAR; INTRAVENOUS at 07:16

## 2022-08-18 RX ADMIN — PHYTONADIONE 5 MG: 5 TABLET ORAL at 05:33

## 2022-08-18 RX ADMIN — METOPROLOL TARTRATE 50 MG: 50 TABLET, FILM COATED ORAL at 19:32

## 2022-08-18 RX ADMIN — FENTANYL CITRATE 50 MCG: 50 INJECTION INTRAMUSCULAR; INTRAVENOUS at 07:02

## 2022-08-18 RX ADMIN — HYDROCODONE BITARTRATE AND ACETAMINOPHEN 1 TABLET: 7.5; 325 TABLET ORAL at 10:27

## 2022-08-18 RX ADMIN — ACETAMINOPHEN 500 MG: 500 TABLET, FILM COATED ORAL at 06:46

## 2022-08-18 RX ADMIN — HYDROMORPHONE HYDROCHLORIDE 0.5 MG: 1 INJECTION, SOLUTION INTRAMUSCULAR; INTRAVENOUS; SUBCUTANEOUS at 09:24

## 2022-08-18 RX ADMIN — HYDRALAZINE HYDROCHLORIDE 25 MG: 25 TABLET, FILM COATED ORAL at 10:27

## 2022-08-18 RX ADMIN — ONDANSETRON 4 MG: 2 INJECTION INTRAMUSCULAR; INTRAVENOUS at 08:05

## 2022-08-18 RX ADMIN — HYDROCODONE BITARTRATE AND ACETAMINOPHEN 1 TABLET: 7.5; 325 TABLET ORAL at 23:47

## 2022-08-18 RX ADMIN — HYDROCODONE BITARTRATE AND ACETAMINOPHEN 1 TABLET: 7.5; 325 TABLET ORAL at 02:42

## 2022-08-18 RX ADMIN — FENTANYL CITRATE 50 MCG: 50 INJECTION INTRAMUSCULAR; INTRAVENOUS at 08:26

## 2022-08-18 RX ADMIN — HYDROCODONE BITARTRATE AND ACETAMINOPHEN 1 TABLET: 7.5; 325 TABLET ORAL at 23:26

## 2022-08-18 RX ADMIN — GLYCOPYRROLATE 0.2 MG: 0.2 INJECTION INTRAMUSCULAR; INTRAVENOUS at 07:07

## 2022-08-18 RX ADMIN — PROPOFOL 25 MCG/KG/MIN: 10 INJECTION, EMULSION INTRAVENOUS at 07:07

## 2022-08-18 RX ADMIN — PROPOFOL 200 MG: 10 INJECTION, EMULSION INTRAVENOUS at 07:02

## 2022-08-18 RX ADMIN — HYDRALAZINE HYDROCHLORIDE 25 MG: 25 TABLET, FILM COATED ORAL at 19:32

## 2022-08-18 RX ADMIN — VANCOMYCIN HYDROCHLORIDE 1000 MG: 1 INJECTION, SOLUTION INTRAVENOUS at 17:42

## 2022-08-18 RX ADMIN — DEXAMETHASONE SODIUM PHOSPHATE 8 MG: 10 INJECTION INTRAMUSCULAR; INTRAVENOUS at 07:07

## 2022-08-18 RX ADMIN — ROCURONIUM BROMIDE 50 MG: 50 INJECTION INTRAVENOUS at 07:03

## 2022-08-18 RX ADMIN — TRANEXAMIC ACID 1000 MG: 1 INJECTION, SOLUTION INTRAVENOUS at 07:10

## 2022-08-18 RX ADMIN — DIPHENHYDRAMINE HYDROCHLORIDE 12.5 MG: 50 INJECTION, SOLUTION INTRAMUSCULAR; INTRAVENOUS at 07:07

## 2022-08-18 RX ADMIN — HYDROCODONE BITARTRATE AND ACETAMINOPHEN 1 TABLET: 7.5; 325 TABLET ORAL at 19:32

## 2022-08-18 RX ADMIN — WARFARIN SODIUM 7.5 MG: 5 TABLET ORAL at 17:42

## 2022-08-18 RX ADMIN — FENTANYL CITRATE 50 MCG: 50 INJECTION INTRAMUSCULAR; INTRAVENOUS at 08:43

## 2022-08-18 RX ADMIN — NEOSTIGMINE METHYLSULFATE 4 MG: 0.5 INJECTION INTRAVENOUS at 08:09

## 2022-08-18 RX ADMIN — HYDROMORPHONE HYDROCHLORIDE 0.5 MG: 1 INJECTION, SOLUTION INTRAMUSCULAR; INTRAVENOUS; SUBCUTANEOUS at 08:50

## 2022-08-18 RX ADMIN — TRANEXAMIC ACID 1000 MG: 1 INJECTION, SOLUTION INTRAVENOUS at 08:08

## 2022-08-18 RX ADMIN — SODIUM CHLORIDE, POTASSIUM CHLORIDE, SODIUM LACTATE AND CALCIUM CHLORIDE: 600; 310; 30; 20 INJECTION, SOLUTION INTRAVENOUS at 06:54

## 2022-08-18 RX ADMIN — FENTANYL CITRATE 50 MCG: 50 INJECTION INTRAMUSCULAR; INTRAVENOUS at 07:28

## 2022-08-18 RX ADMIN — FAMOTIDINE 20 MG: 10 INJECTION INTRAVENOUS at 06:46

## 2022-08-18 RX ADMIN — GLYCOPYRROLATE 0.6 MG: 0.2 INJECTION INTRAMUSCULAR; INTRAVENOUS at 08:09

## 2022-08-18 RX ADMIN — PROMETHAZINE HYDROCHLORIDE 12.5 MG: 12.5 TABLET ORAL at 06:46

## 2022-08-18 NOTE — PROGRESS NOTES
[facility] Clinical Pharmacy Services: Warfarin Dosing/Monitoring Consult    Fe Arevalo is a 72 y.o. female, estimated creatinine clearance is 39.8 mL/min (A) (by C-G formula based on SCr of 1.69 mg/dL (H)). weighing 104 kg (230 lb 2.6 oz).    Results from last 7 days   Lab Units 08/18/22  0341 08/17/22  2028 08/16/22  1630   INR  2.36* 2.58* 2.70*   HEMOGLOBIN g/dL  --   --  9.7*   HEMATOCRIT %  --   --  29.4*   PLATELETS 10*3/mm3  --   --  314     Prior to admission anticoagulation: Home regimen of 5mg on Wed & Sun and 7.5mg all other days of the week    Hospital Anticoagulation:  Consulting provider: Dr. Hammonds  Start date: 8/18/22  Indication:  full anticoagulation  Target INR: 2 - 3  Expected duration: indefinite  Bridge Therapy: none    Potential food or drug interactions:   Education complete?/Date: No; plan for follow up TBD    Assessment/Plan:  Dose: INR today = 2.36 (in target) therefore will continue with current home regimen as above in PTA anticoagulation. Patient will receive 7.5 mg today.  Monitor for any signs or symptoms of bleeding  Follow up daily INRs and dose adjustments    Pharmacy will continue to follow until discharge or discontinuation of warfarin.     Jensen De Jesus, [facility]  Clinical Pharmacist

## 2022-08-18 NOTE — ANESTHESIA POSTPROCEDURE EVALUATION
Patient: Fe Arevalo    Procedure Summary     Date: 08/18/22 Room / Location: [facility] OR [address] [place] [address] MAIN OR    Anesthesia Start: 0657 Anesthesia Stop: 0836    Procedure: KNEE POLY INSERT EXCHANGE (Right Knee) Diagnosis:       Status post knee replacement, unspecified laterality      (Status post knee replacement, unspecified laterality [Z96.659])    Surgeons: Nacho Hammonds II, MD Provider: Callum Panda MD    Anesthesia Type: general ASA Status: 3          Anesthesia Type: general    Vitals  Vitals Value Taken Time   /71 08/18/22 0946   Temp 36.5 °C (97.7 °F) 08/18/22 0833   Pulse 77 08/18/22 0950   Resp 20 08/18/22 0930   SpO2 100 % 08/18/22 0950   Vitals shown include unvalidated device data.        Post Anesthesia Care and Evaluation    Patient location during evaluation: PACU  Patient participation: complete - patient participated  Level of consciousness: awake  Pain scale: See nurse's notes for pain score.  Pain management: adequate    Airway patency: patent  Anesthetic complications: No anesthetic complications  PONV Status: none  Cardiovascular status: acceptable  Respiratory status: acceptable  Hydration status: acceptable    Comments: Patient seen and examined postoperatively; vital signs stable; SpO2 greater than or equal to 90%; cardiopulmonary status stable; nausea/vomiting adequately controlled; pain adequately controlled; no apparent anesthesia complications; patient discharged from anesthesia care when discharge criteria were met

## 2022-08-18 NOTE — ANESTHESIA PROCEDURE NOTES
Airway  Urgency: elective    Date/Time: 8/18/2022 7:05 AM  Airway not difficult    General Information and Staff    Patient location during procedure: OR  Anesthesiologist: Callum Panda MD  CRNA/CAA: Kandy Ramirez CRNA    Indications and Patient Condition  Indications for airway management: airway protection    Preoxygenated: yes  MILS not maintained throughout  Mask difficulty assessment: 2 - vent by mask + OA or adjuvant +/- NMBA    Final Airway Details  Final airway type: endotracheal airway      Successful airway: ETT  Cuffed: yes   Successful intubation technique: direct laryngoscopy  Facilitating devices/methods: intubating stylet  Endotracheal tube insertion site: oral  Blade: Marv  ETT size (mm): 7.0  Cormack-Lehane Classification: grade I - full view of glottis  Placement verified by: chest auscultation and capnometry   Cuff volume (mL): 6  Measured from: lips  ETT/EBT  to lips (cm): 20  Number of attempts at approach: 1  Assessment: lips, teeth, and gum same as pre-op and atraumatic intubation    Additional Comments  Airway exam prior to DL, teeth/lips inspected. Preoxygenated with 100% O2; sniffing position, easy mask ventilation. Eyes taped. Atraumatic intubation. Lips and teeth intact, no damage. ETT connected to vent. Confirmed EBBS, +EtCO2.

## 2022-08-18 NOTE — PROGRESS NOTES
Hospital Follow Up    LOS:  LOS: 1 day   Patient Name: Fe Arevalo  Age/Sex: 72 y.o. female  : 1950  MRN: [id number]    Day of Service: 22   Length of Stay: 1  Encounter Provider: Rashad Ramires MD  Place of Service: [facility] CARDIOLOGY  Patient Care Team:  Dion Curry MD as PCP - General  Carlos, Guillermo Epps MD as Consulting Physician (Cardiac Electrophysiology)    Subjective:     Chief Complaint: Anticoagulation management    Interval History: Patient is postop today.  Knee is a little sore but overall she is doing well.    Objective:     Objective:  Temp:  [97 °F (36.1 °C)-98.1 °F (36.7 °C)] 97 °F (36.1 °C)  Heart Rate:  [58-88] 72  Resp:  [16-20] 18  BP: ()/() 133/65     Intake/Output Summary (Last 24 hours) at 2022 1209  Last data filed at 2022 0828  Gross per 24 hour   Intake 1040 ml   Output 50 ml   Net 990 ml     Body mass index is 33.99 kg/m².      22  1618   Weight: 104 kg (230 lb 2.6 oz)     Weight change:       Physical Exam:   General : Alert, cooperative, in no acute distress.  Neuro: Alert,cooperative and oriented.  Lungs: CTAB. Normal respiratory effort and rate.  CV: Regular rate and rhythm, normal S1 and S2, no murmurs, gallops or rubs.  ABD: Soft, nontender, nondistended. Positive bowel sounds.  Extr: No edema or cyanosis, moves all extremities.    Lab Review:   Results from last 7 days   Lab Units 22  1630   SODIUM mmol/L 130*   POTASSIUM mmol/L 4.0   CHLORIDE mmol/L 96*   CO2 mmol/L 23.9   BUN mg/dL 41*   CREATININE mg/dL 1.69*   GLUCOSE mg/dL 108*   CALCIUM mg/dL 10.8*         Results from last 7 days   Lab Units 22  1630   WBC 10*3/mm3 8.05   HEMOGLOBIN g/dL 9.7*   HEMATOCRIT % 29.4*   PLATELETS 10*3/mm3 314     Results from last 7 days   Lab Units 22  0341 22   INR  2.36* 2.58*               Invalid input(s): LDLCALC            Current Medications:   Scheduled  Meds:hydrALAZINE, 25 mg, Oral, BID  meclizine, 25 mg, Oral, Daily  metoprolol tartrate, 50 mg, Oral, BID  vancomycin, 1,000 mg, Intravenous, Q24H  [START ON 8/21/2022] warfarin, 5 mg, Oral, Once per day on Sun Wed  warfarin, 7.5 mg, Oral, Once per day on Mon Tue Thu Fri Sat      Continuous Infusions:lactated ringers, 9 mL/hr, Last Rate: 9 mL/hr (08/18/22 1031)  Pharmacy to dose warfarin,         Allergies:  Allergies   Allergen Reactions   • Keflex [Cephalexin] Hives   • Pantoprazole Anaphylaxis and Angioedema   • Cephalosporins Hives   • Morphine And Related Other (See Comments) and Palpitations     Pt does not like the way it makes her feel.  Does not want it..     • Percocet [Oxycodone-Acetaminophen] Nausea Only   • Adhesive Tape Other (See Comments)     BLISTER   • Benadryl [Diphenhydramine] Palpitations   • Ciprofloxacin Rash   • Codeine Other (See Comments)     CHEST PAIN       Assessment:       Status post knee replacement    Status post knee replacement, unspecified laterality        Plan:   1.  Status post knee I&D.  2.  Paroxysmal A. fib INR goal is 2-3.  Pharmacy is currently managing warfarin.  3.  Mild to moderate mitral vegetation.  4.  Appears stable from a cardiovascular standpoint.  We will see patient on an as-needed basis.        Rashad Ramires MD  08/18/22  12:09 EDT

## 2022-08-18 NOTE — CASE MANAGEMENT/SOCIAL WORK
Discharge Planning Assessment  [facility]     Patient Name: Fe Arevalo  MRN: [id number]  Today's Date: 8/18/2022    Admit Date: 8/16/2022     Discharge Needs Assessment     Row Name 08/18/22 1524       Living Environment    People in Home spouse    Name(s) of People in Home Asael/Jensen.    Primary Care Provided by self    Provides Primary Care For no one    Family Caregiver if Needed child(hcevy), adult;spouse    Quality of Family Relationships helpful;involved;supportive    Able to Return to Prior Arrangements yes       Resource/Environmental Concerns    Transportation Concerns none       Transition Planning    Patient/Family Anticipates Transition to home with family;home with help/services    Patient/Family Anticipated Services at Transition     Transportation Anticipated family or friend will provide       Discharge Needs Assessment    Readmission Within the Last 30 Days no previous admission in last 30 days    Equipment Currently Used at Home walker, rolling;cane, straight    Discharge Facility/Level of Care Needs home with home health    Provided Post Acute Provider List? N/A    N/A Provider List Comment Declines.    Patient's Choice of Community Agency(s) Requests [facility] .               Discharge Plan     Row Name 08/18/22 1525       Plan    Plan Home with family support & [facility].    Patient/Family in Agreement with Plan yes    Plan Comments Spoke with the patient, verified current information and explained the role of the CCP. Patient lives with her /Jensen and has family support. She's IADL and owns both a cane & walker. She's IADL and has no history with . She's worked with [facility] in the past. Patient plans to d/c home with family support & HH. She requests [facility]. Referral sent in [facility]. CCP discussed the possible benefits of SNF after d/c, and the pt is agreeable if needed. CCP will follow for discharge needs.              Continued Care and Services -  Admitted Since 8/16/2022     Home Medical Care     Service Provider Request Status Selected Services Address Phone Fax Patient Preferred    [facility]  Pending - Request Sent N/A [address] [address] [phone] --            Selected Continued Care - Prior Encounters Includes selections from prior encounters from 5/18/2022 to 8/18/2022    Discharged on 7/16/2022 Admission date: 7/15/2022 - Discharge disposition: Home or Self Care    Home Medical Care     Service Provider Selected Services Address Phone Fax Patient Preferred    [facility] [address] [address] [phone] --                       Demographic Summary     Row Name 08/18/22 1524       General Information    Admission Type inpatient    Reason for Consult discharge planning    Preferred Language English       Contact Information    Permission Granted to Share Info With ;family/designee               Functional Status     Row Name 08/18/22 1524       Functional Status    Usual Activity Tolerance good       Functional Status, IADL    Medications independent    Meal Preparation assistive equipment    Housekeeping assistive equipment    Laundry assistive equipment    Shopping assistive equipment       Mental Status Summary    Recent Changes in Mental Status/Cognitive Functioning no changes               Psychosocial     Row Name 08/18/22 1524       Intellectual Performance WDL    Level of Consciousness Alert       Coping/Stress    Patient Personal Strengths able to adapt    Sources of Support adult child(chevy);spouse    Reaction to Health Status accepting    Understanding of Condition and Treatment adequate understanding of medical condition;adequate understanding of treatment       Developmental Stage (Joesph's)    Developmental Stage Stage 8 (65 years-death/Late Adulthood) Integrity vs. Despair                 Loulou SHELL RN

## 2022-08-18 NOTE — PLAN OF CARE
Goal Outcome Evaluation:  Plan of Care Reviewed With: patient        Progress: no change  Outcome Evaluation: patient resting comfortably between care, ambulating with assistance to bathroom, pain controlled with PO medication, surgery planned for 8/18/22, educated on b/p monitoring

## 2022-08-18 NOTE — DISCHARGE PLACEMENT REQUEST
"J Carlos Hernadez (72 y.o. Female)             Date of Birth   1950    Social Security Number       Address   [address] [place] [address]    Home Phone   [phone]    MRN   [id number]       Spiritism   [place]ze    Marital Status                               Admission Date   8/16/22    Admission Type   Urgent    Admitting Provider   Nacho Hammonds II, MD    Attending Provider   Nacho Hammonds II, MD    Department, Room/Bed   [address], P897/1       Discharge Date       Discharge Disposition       Discharge Destination                               Attending Provider: Nacho Hammonds II, MD    Allergies: Keflex [Cephalexin], Pantoprazole, Cephalosporins, Morphine And Related, Percocet [Oxycodone-acetaminophen], Adhesive Tape, Benadryl [Diphenhydramine], Ciprofloxacin, Codeine    Isolation: None   Infection: None   Code Status: Prior   Advance Care Planning Activity    Ht: 175.3 cm (69\")   Wt: 104 kg (230 lb 2.6 oz)    Admission Cmt: None   Principal Problem: Status post knee replacement [Z96.659]                 Active Insurance as of 8/16/2022     Primary Coverage     Payor Plan Insurance Group Employer/Plan Group    MEDICARE MEDICARE A & B      Payor Plan Address Payor Plan Phone Number Payor Plan Fax Number Effective Dates    [address] [phone]  3/1/2009 - None Entered    [facility] [address]       Subscriber Name Subscriber Birth Date Member ID       J CARLOS HERNADEZ 1950 [id number]           Secondary Coverage     Payor Plan Insurance Group Employer/Plan Group    [place] [employer]      Payor Plan Address Payor Plan Phone Number Payor Plan Fax Number Effective Dates    [address] [place] [address] [phone]  5/1/2017 - None Entered    [facility] [address]       Subscriber Name Subscriber Birth Date Member ID       J CARLOS HERNADEZ 1950 [id number]                 Emergency Contacts      (Rel.) " Home Phone Work Phone Mobile Phone    robert melchor (Son) [phone] -- [phone]    Priya Lynn (Daughter) [phone] -- [phone]

## 2022-08-18 NOTE — ANESTHESIA PREPROCEDURE EVALUATION
Anesthesia Evaluation     history of anesthetic complications: PONV  NPO Solid Status: > 8 hours  NPO Liquid Status: > 2 hours           Airway   Mallampati: II  Neck ROM: full  no difficulty expected  Dental - normal exam     Pulmonary - normal exam   (+) a smoker Former,   (-) COPD, asthma, sleep apnea    PE comment: nonlabored  Cardiovascular - normal exam  Exercise tolerance: poor (<4 METS) (Due to knee)    Rhythm: regular  Rate: normal    (+) hypertension, dysrhythmias (a-fib resolved with ablation) Atrial Fib,   (-) valvular problems/murmurs, past MI, CAD, angina    ROS comment: INR 2.3    Neuro/Psych- negative ROS  (-) seizures, TIA, CVA  GI/Hepatic/Renal/Endo - negative ROS   (-) GERD, liver disease, no renal disease, diabetes, no thyroid disorder    Musculoskeletal     (+) arthralgias,       ROS comment: Osteomyelitis  Abdominal    Substance History      OB/GYN          Other   arthritis,                    Anesthesia Plan    ASA 3     general     intravenous induction     Anesthetic plan, risks, benefits, and alternatives have been provided, discussed and informed consent has been obtained with: patient.        CODE STATUS:

## 2022-08-19 LAB
CREAT SERPL-MCNC: 1.57 MG/DL (ref 0.57–1)
EGFRCR SERPLBLD CKD-EPI 2021: 35.3 ML/MIN/1.73
INR PPP: 1.45 (ref 0.9–1.1)
PROTHROMBIN TIME: 17.4 SECONDS (ref 11.7–14.2)
VANCOMYCIN TROUGH SERPL-MCNC: 15 MCG/ML (ref 5–20)

## 2022-08-19 PROCEDURE — 97162 PT EVAL MOD COMPLEX 30 MIN: CPT

## 2022-08-19 PROCEDURE — 82565 ASSAY OF CREATININE: CPT | Performed by: ORTHOPAEDIC SURGERY

## 2022-08-19 PROCEDURE — 25010000002 PIPERACILLIN SOD-TAZOBACTAM PER 1 G: Performed by: INTERNAL MEDICINE

## 2022-08-19 PROCEDURE — 97530 THERAPEUTIC ACTIVITIES: CPT

## 2022-08-19 PROCEDURE — 85610 PROTHROMBIN TIME: CPT | Performed by: ORTHOPAEDIC SURGERY

## 2022-08-19 PROCEDURE — 99223 1ST HOSP IP/OBS HIGH 75: CPT | Performed by: INTERNAL MEDICINE

## 2022-08-19 PROCEDURE — 80202 ASSAY OF VANCOMYCIN: CPT | Performed by: ORTHOPAEDIC SURGERY

## 2022-08-19 RX ORDER — HYDROCODONE BITARTRATE AND ACETAMINOPHEN 10; 325 MG/1; MG/1
1 TABLET ORAL EVERY 4 HOURS PRN
Status: DISCONTINUED | OUTPATIENT
Start: 2022-08-19 | End: 2022-08-22 | Stop reason: HOSPADM

## 2022-08-19 RX ORDER — HYDROCODONE BITARTRATE AND ACETAMINOPHEN 10; 325 MG/1; MG/1
2 TABLET ORAL EVERY 4 HOURS PRN
Status: DISCONTINUED | OUTPATIENT
Start: 2022-08-19 | End: 2022-08-22 | Stop reason: HOSPADM

## 2022-08-19 RX ADMIN — WARFARIN SODIUM 7.5 MG: 5 TABLET ORAL at 18:42

## 2022-08-19 RX ADMIN — HYDROCODONE BITARTRATE AND ACETAMINOPHEN 2 TABLET: 10; 325 TABLET ORAL at 18:43

## 2022-08-19 RX ADMIN — METOPROLOL TARTRATE 50 MG: 50 TABLET, FILM COATED ORAL at 08:22

## 2022-08-19 RX ADMIN — HYDROCODONE BITARTRATE AND ACETAMINOPHEN 2 TABLET: 10; 325 TABLET ORAL at 22:50

## 2022-08-19 RX ADMIN — MECLIZINE HYDROCHLORIDE 25 MG: 25 TABLET ORAL at 08:23

## 2022-08-19 RX ADMIN — TAZOBACTAM SODIUM AND PIPERACILLIN SODIUM 3.38 G: 375; 3 INJECTION, SOLUTION INTRAVENOUS at 19:31

## 2022-08-19 RX ADMIN — HYDROCODONE BITARTRATE AND ACETAMINOPHEN 2 TABLET: 10; 325 TABLET ORAL at 03:47

## 2022-08-19 RX ADMIN — HYDROCODONE BITARTRATE AND ACETAMINOPHEN 2 TABLET: 10; 325 TABLET ORAL at 12:53

## 2022-08-19 RX ADMIN — HYDRALAZINE HYDROCHLORIDE 25 MG: 25 TABLET, FILM COATED ORAL at 08:22

## 2022-08-19 RX ADMIN — HYDRALAZINE HYDROCHLORIDE 25 MG: 25 TABLET, FILM COATED ORAL at 22:50

## 2022-08-19 RX ADMIN — HYDROCODONE BITARTRATE AND ACETAMINOPHEN 2 TABLET: 10; 325 TABLET ORAL at 08:22

## 2022-08-19 RX ADMIN — METOPROLOL TARTRATE 50 MG: 50 TABLET, FILM COATED ORAL at 19:34

## 2022-08-19 NOTE — CASE MANAGEMENT/SOCIAL WORK
Continued Stay Note  [facility]     Patient Name: Fe Arevalo  MRN: [id number]  Today's Date: 8/19/2022    Admit Date: 8/16/2022     Discharge Plan     Row Name 08/19/22 1731       Plan    Plan SNF    Patient/Family in Agreement with Plan yes    Plan Comments PT eliazar noted. Discussed with the patient who said she now wants to d/c to SNF. CCP discussed the possible benefits of HH, and she declined. She requests a referral to the [facility]. Referral sent. CCP called and left a message with the  at the SNF. CCP also spoke with a nurse at the [facility] who said everyone in their admissions office is gone for the day and she does not know who would be on-call over the weekend. [facility] sent a message to the Weekend CCP Staff to f/u on this case. CCP will follow.               Discharge Codes    No documentation.               Expected Discharge Date and Time     Expected Discharge Date Expected Discharge Time    Aug 20, 2022             Loulou SHELL RN

## 2022-08-19 NOTE — OP NOTE
Irrigation and Debridement Operative Note  Dr. SHIRA Hammonds II  [phone]    PATIENT NAME: Fe Arevalo  MRN: [id number]  : 1950 AGE: 72 y.o. GENDER: female  DATE OF OPERATION: 2022  PREOPERATIVE DIAGNOSIS: Right knee infection with open wound   POSTOPERATIVE DIAGNOSIS: Same  OPERATION PERFORMED: Irrigation and Debridement of right knee with polyethylene exchange  SURGEON: Nacho Hammonds MD  Circulator: Duarte Luciano RN  Scrub Person: Odilon Neely  Vendor Representative: Apolinar Rios  ANESTHESIA: General  ASSISTANT: None   ESTIMATED BLOOD LOSS: 100cc  SPONGE AND NEEDLE COUNT: Correct  INDICATIONS: This patient was found to have a draining right knee wound. The risks of surgery were discussed and included the risk of anesthesia, continued infection, damage to neurovascular structures, the need for further procedures, medical complications, and others. No guarantees were made. The patient wished to proceed with surgery and a surgical consent was signed.    DETAILS OF PROCEDURE:    The patient was met in the preoperative area. The site was marked. The consent and H&P were reviewed. The patient was then wheeled back to the operative suite and underwent anesthesia. Excess hair about the operative area was removed using surgical clippers. Surgical alcohol was used to thoroughly clean the entire operative extremity.    The operative extremity was then prepped and draped in the normal sterile fashion which included multiple layers of sterile drapes. A surgical timeout was performed in which administration of preoperative antibiotics and the surgical site were confirmed.    I began the case by opening up the incision.  There was some necrotic looking fatty tissue underneath the skin and this was sharply debrided.  The capsular tissue was noted to be closed deep but I was worried that this could have contaminated the deep tissues.  I did thoroughly irrigate the superficial tissues using Betadine  "and cystoscopy tubing with manual lavage and saline, but afterwards I did incised the deep capsular tissue where a thorough irrigation debridement of the deep tissues using a rongeur and knife for sharp debridement along with manual lavage cystoscopy tubing and saline with Betadine.  I also remove the polyethylene and wash behind the knee.  After a thorough debridement and irrigation, a new polyethylene was opened inserted.  The wound was then closed in layers.  A sterile drain was left deep within the knee capsule.  2 g of vancomycin powder were left within the knee and the knee was closed in layers.  A sterile dressing was applied.    The patient was then moved onto the bed and awoken from anesthesia.  The patient was taken to the recovery room in stable condition. There were no complications and the patient tolerated the procedure well.    R \"Stevan\" Cassius DAVIES MD  Orthopaedic Surgery  [facility]  [phone]    "

## 2022-08-19 NOTE — PROGRESS NOTES
[facility] Clinical Pharmacy Services: Warfarin Dosing/Monitoring Consult    Fe Arevalo is a 72 y.o. female, estimated creatinine clearance is 39.8 mL/min (A) (by C-G formula based on SCr of 1.69 mg/dL (H)). weighing 104 kg (230 lb 2.6 oz).    Results from last 7 days   Lab Units 08/19/22  0442 08/18/22  1212 08/18/22  0341 08/17/22  2028 08/16/22  1630   INR  1.45* 1.83* 2.36* 2.58* 2.70*   HEMOGLOBIN g/dL  --   --   --   --  9.7*   HEMATOCRIT %  --   --   --   --  29.4*   PLATELETS 10*3/mm3  --   --   --   --  314     Prior to admission anticoagulation: Home regimen of 5mg on Wed & Sun and 7.5mg all other days of the week    Hospital Anticoagulation:  Consulting provider: Dr. Hammonds  Start date: 8/18/22  Indication:  full anticoagulation  Target INR: 2 - 3  Expected duration: indefinite  Bridge Therapy: none    Potential food or drug interactions:   Education complete?/Date: No; plan for follow up TBD    Assessment/Plan:  Dose: INR today = 1.45 (below target) Patient received a total of 10mg of po Vitamin K on the 17th & 18th in order to have surgery yesterday.Will continue with current home regimen as above in PTA anticoagulation. Patient will receive 7.5 mg today. Will probably take a few days of dosing to get level back above target INR due to getting the Vitamin K.   Monitor for any signs or symptoms of bleeding  Follow up daily INRs and dose adjustments    Pharmacy will continue to follow until discharge or discontinuation of warfarin.     Jensen De Jesus, [facility]  Clinical Pharmacist

## 2022-08-19 NOTE — PLAN OF CARE
Goal Outcome Evaluation:  Plan of Care Reviewed With: (P) patient, spouse           Outcome Evaluation: (P) Pt is a 71 yo F presenting to therapy post op I/D and poly exchange following infection of previous knee surgery. At this time the pt is able to rest comfortably but has increased pain during activity based interventions. Regardless of pain increase the pt is able to complete all interventions of TKA protocol. Pt demonstrated decreased functional mobility resulting from pain, decrease ROM, impaired gait, and decreased strength. The pt feels that SNF is needed to continue progressing towards PLOF. Skilled therapy services would benefit the pt return to PLOF. PT services should focus on pain, gait, strength, and functional mobility.

## 2022-08-19 NOTE — PLAN OF CARE
Goal Outcome Evaluation:  Plan of Care Reviewed With: patient        Progress: no change  Outcome Evaluation: patient having increased pain throughout shift, oncall notified, see new orders, ambulating with assistance to bathroom, voiding without difficulty, educated on b/p monitoring

## 2022-08-19 NOTE — CONSULTS
Referring Provider: Nacho Hammonds II, MD  [address] [facility]  [address]  [place] [address]  Reason for Consultation: Right prosthetic knee septic arthritis    Subjective   History of present illness: This is a 72-year-old female status post recent right total knee replacement who was admitted on August 16 with concerns for right prosthetic knee septic arthritis.  Patient underwent total right knee replacement on July 15.  She did well initially but a week and a half after her surgery she developed a red painful lesion on her right calf.  The patient was seen by her primary care provider and diagnosed with phlebitis.  She was started on Keflex but developed hives.  She was switched to a different antibiotic but does not recall its name.  She states shortly thereafter she developed swelling and erythema of her right knee.  She then developed wound dehiscence.  She was seen by orthopedic surgery and her antibiotic was extended and ultimately she was admitted on August 16 for surgical intervention.  Admission blood work revealed a normal white blood cell count.  She was empirically started on vancomycin.  Superficial wound cultures are growing Pseudomonas prevention Enterococcus species.  She was taken to the operating room on August 18 and underwent irrigation and debridement with polyethylene exchange.    Past Medical History:   Diagnosis Date   • A-fib ([facility])    • Histoplasmosis     STATES BILAT EYES:  INJECTIONS INTO HER EYES BILAT EVERY 7 WEEKS.  DENIES VISION LOSS   • History of blood clots 08/2021    PT STATES WAS DIAGNOSED WITH BLOOD CLOT BEHIND HER HEART AUG 2021-PT STATES THIS IS RESOLVED AND HAD CARDIAC ABLATION APRIL 2022   • Hypertension    • Lab test positive for detection of COVID-19 virus 02/2022   • Osteoarthritis of right knee    • Osteomyelitis ([facility]) 2006    MRSA RIGHT FOOT   • PONV (postoperative nausea and vomiting)    • Right knee pain     WEAKNESS AND LIMITED MOBILITY       Past  Surgical History:   Procedure Laterality Date   • CARDIAC ABLATION  04/29/2022    X2.  PT HAS ATRICLIP DANISHA EXCLUSION SYSTEM IN PLACE THAT PT STATES IS NOT WORKING   • CHOLECYSTECTOMY     • COLONOSCOPY     • FOOT SURGERY Right     x7 DT OSTEOMYELITIS   • HYSTERECTOMY  2004   • KNEE MINI REVISION Right 8/18/2022    Procedure: KNEE POLY INSERT EXCHANGE;  Surgeon: Nacho Hammonds II, MD;  Location: [facility] OR;  Service: Orthopedics;  Laterality: Right;   • TOTAL KNEE ARTHROPLASTY Right 7/15/2022    Procedure: TOTAL KNEE ARTHROPLASTY WITH ROBOT;  Surgeon: Nacho Hammonds II, MD;  Location: [facility];  Service: Robotics - Ortho;  Laterality: Right;   •  SHUNT INSERTION          reports that she quit smoking about 38 years ago. Her smoking use included cigarettes. She has a 5.00 pack-year smoking history. She has never used smokeless tobacco. She reports current alcohol use. She reports current drug use. Drug: Hydrocodone.    family history includes Emphysema in her father; Heart disease in her mother; Lung disease in her father.    Allergies   Allergen Reactions   • Keflex [Cephalexin] Hives   • Pantoprazole Anaphylaxis and Angioedema   • Cephalosporins Hives   • Morphine And Related Other (See Comments) and Palpitations     Pt does not like the way it makes her feel.  Does not want it..     • Percocet [Oxycodone-Acetaminophen] Nausea Only   • Adhesive Tape Other (See Comments)     BLISTER   • Benadryl [Diphenhydramine] Palpitations   • Ciprofloxacin Rash   • Codeine Other (See Comments)     CHEST PAIN       Medication:  Antibiotics:  Vancomycin dosing per pharmacy    Please refer to the medical record for a full medication list    Review of Systems  Pertinent items are noted in HPI, all other systems reviewed and negative    Objective   Vital Signs   Temp:  [96.8 °F (36 °C)-97.7 °F (36.5 °C)] 96.9 °F (36.1 °C)  Heart Rate:  [61-86] 76  Resp:  [14-16] 16  BP: (106-158)/(53-72)  117/55    Physical Exam:   General: In no acute distress  HEENT: Normocephalic, atraumatic,  no scleral icterus.   Neck: Supple, trachea is midline  Cardiovascular: Normal rate, regular rhythm, normal S1 and S2, no murmurs, rubs, or gallops   Respiratory: Lungs are clear to auscultation bilaterally, no wheezing  GI: Abdomen is soft, nontender, nondistended, positive bowel sounds bilaterally,   Musculoskeletal: Right knee covered in dressing, drain in place  Skin: No rashes   Extremities: No E/C/C  Neurological: Alert and oriented, moving all 4 extremities  Psychiatric: Tearful    Results Review:   I reviewed the patient's new clinical results.  I reviewed the patient's new imaging results and agree with the interpretation.    Lab Results   Component Value Date    WBC 8.05 08/16/2022    HGB 9.7 (L) 08/16/2022    HCT 29.4 (L) 08/16/2022    MCV 92.2 08/16/2022     08/16/2022       Lab Results   Component Value Date    GLUCOSE 108 (H) 08/16/2022    BUN 41 (H) 08/16/2022    CREATININE 1.57 (H) 08/19/2022    BCR 24.3 08/16/2022    CO2 23.9 08/16/2022    CALCIUM 10.8 (H) 08/16/2022    ALBUMIN 4.00 07/11/2022    LABIL2 1.2 01/22/2019    AST 12 07/11/2022    ALT 12 07/11/2022       Microbiology:  8/18 OR cx GNR  8/16 right knee superficial wound cultures Romanus, presidential, Enterococcus species  8/16 COVID neg     Radiology:  none    Assessment & Plan   Right prosthetic knee septic arthritis    Discontinue empiric vancomycin.  Start Zosyn 3.375 g IV every 8 hours which will cover all 3 organisms isolated from superficial wound cultures.  Final antibiotic choice pending operative culture data.  Anticipate a 6-week course of antibiotics followed by oral suppressive antibiotic therapy.    Thank you for this consult.  We will see the patient again once her cultures are finalized.  Please call with questions or concerns    I discussed the patient's findings and my recommendations with patient and nursing staff

## 2022-08-19 NOTE — PROGRESS NOTES
[facility] Services: Vancomycin Level Monitoring Note    Fe Arevalo is a 72 y.o. female who is on day 3 of pharmacy to dose vancomycin for Surgical Prophylaxis.    Estimated Creatinine Clearance: 39.8 mL/min (A) (by C-G formula based on SCr of 1.69 mg/dL (H)).    Current Vanc Dose: 1000 mg IV every  24  hours  Results from last 7 days   Lab Units 08/19/22  1438   VANCOMYCIN TR mcg/mL 15.00   Predicted AUC at current dose:453 mg/L.hr  Next Level Date and Time: No repeat level indicated based on the remaining DOT.     No changes at this time. Pharmacy is continuing to monitor and will adjust as needed.    Evette Barillas [facility]  Clinical Pharmacist

## 2022-08-19 NOTE — THERAPY EVALUATION
Patient Name: Fe Arevalo  : 1950    MRN: [id number]                              Today's Date: 2022       Admit Date: 2022    Visit Dx:     ICD-10-CM ICD-9-CM   1. Status post knee replacement, unspecified laterality  Z96.659 V43.65     Patient Active Problem List   Diagnosis   • Arthritis of ankle   • Wound dehiscence   • Pain and swelling of ankle   • Status post knee replacement   • Status post knee replacement, unspecified laterality     Past Medical History:   Diagnosis Date   • A-fib ([facility])    • Histoplasmosis     STATES BILAT EYES:  INJECTIONS INTO HER EYES BILAT EVERY 7 WEEKS.  DENIES VISION LOSS   • History of blood clots 2021    PT STATES WAS DIAGNOSED WITH BLOOD CLOT BEHIND HER HEART AUG 2021-PT STATES THIS IS RESOLVED AND HAD CARDIAC ABLATION 2022   • Hypertension    • Lab test positive for detection of COVID-19 virus 2022   • Osteoarthritis of right knee    • Osteomyelitis ([facility]) 2006    MRSA RIGHT FOOT   • PONV (postoperative nausea and vomiting)    • Right knee pain     WEAKNESS AND LIMITED MOBILITY     Past Surgical History:   Procedure Laterality Date   • CARDIAC ABLATION  04/29/2022    X2.  PT HAS ATRICLIP DANISHA EXCLUSION SYSTEM IN PLACE THAT PT STATES IS NOT WORKING   • CHOLECYSTECTOMY     • COLONOSCOPY     • FOOT SURGERY Right     x7 DT OSTEOMYELITIS   • HYSTERECTOMY     • KNEE MINI REVISION Right 2022    Procedure: KNEE POLY INSERT EXCHANGE;  Surgeon: Nacho Hammonds II, MD;  Location: [facility];  Service: Orthopedics;  Laterality: Right;   • TOTAL KNEE ARTHROPLASTY Right 7/15/2022    Procedure: TOTAL KNEE ARTHROPLASTY WITH ROBOT;  Surgeon: Nacho Hammonds II, MD;  Location: [facility];  Service: Robotics - Ortho;  Laterality: Right;   •  SHUNT INSERTION        General Information     Row Name 22 1601          Physical Therapy Time and Intention    Document Type evaluation (P)   -SM     Mode of Treatment individual  therapy;physical therapy (P)   -[facility] Name 08/19/22 1602          General Information    Prior Level of Function independent:;gait;transfer;bed mobility (P)   Pt ambulating with walker following TKA  -     Existing Precautions/Restrictions no known precautions/restrictions (P)   -     Barriers to Rehab medically complex (P)   -SM     Row Name 08/19/22 1602          Living Environment    People in Home spouse (P)   -     Name(s) of People in Home /Jensen (P)   -SM     Row Name 08/19/22 1602          Home Main Entrance    Number of Stairs, Main Entrance none (P)   -SM     Row Name 08/19/22 1602          Cognition    Orientation Status (Cognition) oriented x 4 (P)   -[facility] Name 08/19/22 1602          Safety Issues, Functional Mobility    Safety Issues Affecting Function (Mobility) -- (P)   -     Impairments Affecting Function (Mobility) balance;pain;strength;range of motion (ROM) (P)   -           User Key  (r) = Recorded By, (t) = Taken By, (c) = Cosigned By    Initials Name Provider Type     Alan Bang, PT Student PT Student               Mobility     Row Name 08/19/22 1605          Bed Mobility    Bed Mobility supine-sit;supine-sit-supine (P)   pt slightly impulsive when sitting at edge of bed  -     Supine-Sit [place] (Bed Mobility) standby assist (P)   -     Supine-Sit-Supine [place] (Bed Mobility) contact guard (P)   -     Assistive Device (Bed Mobility) bed rails (P)   -     Comment, (Bed Mobility) Pt needed assistance to bring R LE back into bed. (P)   -SM     Row Name 08/19/22 1605          Bed-Chair Transfer    Bed-Chair [place] (Transfers) not tested (P)   -SM     Row Name 08/19/22 1605          Sit-Stand Transfer    Sit-Stand [place] (Transfers) verbal cues;contact guard (P)   -     Assistive Device (Sit-Stand Transfers) walker, front-wheeled (P)   -SM     Row Name 08/19/22 1605          Gait/Stairs (Locomotion)    [place] Level (Gait)  verbal cues;contact guard (P)   -SM     Assistive Device (Gait) walker, front-wheeled (P)   -SM     Distance in Feet (Gait) 35 ft (P)   -SM     Deviations/Abnormal Patterns (Gait) right sided deviations;antalgic;gait speed decreased;stride length decreased;weight shifting decreased (P)   -SM     Right Sided Gait Deviations decreased knee extension;weight shift ability decreased (P)   -SM     [place] Level (Stairs) not tested (P)   -SM           User Key  (r) = Recorded By, (t) = Taken By, (c) = Cosigned By    Initials Name Provider Type     Alan Bang, PT Student PT Student               Obj/Interventions     Row Name 08/19/22 1608          Range of Motion Comprehensive    General Range of Motion lower extremity range of motion deficits identified (P)   -     Comment, General Range of Motion R LE ROM deficits (P)   -SM     Row Name 08/19/22 1608          Strength Comprehensive (MMT)    General Manual Muscle Testing (MMT) Assessment lower extremity strength deficits identified (P)   -     Comment, General Manual Muscle Testing (MMT) Assessment R LE Strength Grossly >/= 3+/5 (P)   -SM     Row Name 08/19/22 1608          Motor Skills    Therapeutic Exercise knee (P)   10 reps of TKA protocol  -SM     Row Name 08/19/22 1608          Balance    Balance Assessment standing dynamic balance;standing static balance (P)   -SM     Static Standing Balance contact guard (P)   -SM     Dynamic Standing Balance contact guard (P)   -     Position/Device Used, Standing Balance walker, front-wheeled (P)   -SM           User Key  (r) = Recorded By, (t) = Taken By, (c) = Cosigned By    Initials Name Provider Type     Alan Bang, PT Student PT Student               Goals/Plan     Row Name 08/19/22 1619          Bed Mobility Goal 1 (PT)    Activity/Assistive Device (Bed Mobility Goal 1, PT) bed mobility activities, all (P)   -     [place] Level/Cues Needed (Bed Mobility Goal 1, PT) independent (P)   -SM      Time Frame (Bed Mobility Goal 1, PT) 1 week (P)   -     Row Name 08/19/22 1619          Transfer Goal 1 (PT)    Activity/Assistive Device (Transfer Goal 1, PT) transfers, all (P)   -     [place] Level/Cues Needed (Transfer Goal 1, PT) independent (P)   -SM     Time Frame (Transfer Goal 1, PT) 1 week (P)   -     Row Name 08/19/22 1619          Gait Training Goal 1 (PT)    Activity/Assistive Device (Gait Training Goal 1, PT) gait (walking locomotion) (P)   -SM     [place] Level (Gait Training Goal 1, PT) modified independence (P)   -SM     Distance (Gait Training Goal 1, PT) 80 ft (P)   -SM     Time Frame (Gait Training Goal 1, PT) 1 week (P)   -     Row Name 08/19/22 1619          Therapy Assessment/Plan (PT)    Planned Therapy Interventions (PT) gait training;home exercise program;ROM (range of motion);strengthening;transfer training;balance training;bed mobility training;patient/family education (P)   -           User Key  (r) = Recorded By, (t) = Taken By, (c) = Cosigned By    Initials Name Provider Type    Alan Dillard, PT Student PT Student               Clinical Impression     Row Name 08/19/22 1610          Pain    Pain Location - Side/Orientation Right (P)   -     Pain Location - knee (P)   -     Pre/Posttreatment Pain Comment Pain noted to have pain increased to 10/10 following LE exercises. (P)   -     Pain Intervention(s) Repositioned (P)   -[facility] Name 08/19/22 1610          Plan of Care Review    Plan of Care Reviewed With patient;spouse (P)   -SM     Outcome Evaluation Pt is a 73 yo F presenting to therapy post op I/D and poly exchange following infection of previous knee surgery. At this time the pt is able to rest comfortably but has increased pain during activity based interventions. Regardless of pain increase the pt is able to complete all interventions of TKA protocol. Pt demonstrated decreased functional mobility resulting from pain, decrease ROM, impaired gait,  and decreased strength. The pt feels that SNF is needed to continue progressing towards PLOF. Skilled therapy services would benefit the pt return to PLOF. PT services should focus on pain, gait, strength, and functional mobility. (P)   -SM     Row Name 08/19/22 1610          Therapy Assessment/Plan (PT)    Patient/Family Therapy Goals Statement (PT) Return to PLOF (P)   -SM     Rehab Potential (PT) good, to achieve stated therapy goals (P)   -SM     Criteria for Skilled Interventions Met (PT) yes (P)   -SM     Therapy Frequency (PT) 6 times/wk (P)   -SM     Row Name 08/19/22 1610          Vital Signs    O2 Delivery Pre Treatment room air (P)   -SM     O2 Delivery Intra Treatment room air (P)   -SM     O2 Delivery Post Treatment room air (P)   -SM     Pre Patient Position Supine (P)   -SM     Intra Patient Position Standing (P)   -SM     Post Patient Position Supine (P)   -     Row Name 08/19/22 1610          Positioning and Restraints    Pre-Treatment Position in bed (P)   -SM     Post Treatment Position bed (P)   -SM     In Bed supine;with family/caregiver;with nsg;call light within reach;encouraged to call for assist;exit alarm on (P)   -SM           User Key  (r) = Recorded By, (t) = Taken By, (c) = Cosigned By    Initials Name Provider Type    Alan Dillard, PT Student PT Student               Outcome Measures     Row Name 08/19/22 1621 08/19/22 0800       How much help from another person do you currently need...    Turning from your back to your side while in flat bed without using bedrails? 3 (P)   - 3  -KW    Moving from lying on back to sitting on the side of a flat bed without bedrails? 3 (P)   -SM 3  -KW    Moving to and from a bed to a chair (including a wheelchair)? 3 (P)   -SM 3  -KW    Standing up from a chair using your arms (e.g., wheelchair, bedside chair)? 3 (P)   -SM 3  -KW    Climbing 3-5 steps with a railing? 2 (P)   -SM 3  -KW    To walk in hospital room? 3 (P)   -SM 3  -KW    AM-PAC  6 Clicks Score (PT) 17 (P)   - 18  -KW    Highest level of mobility 5 --> Static standing (P)   - 6 --> Walked 10 steps or more  -    Row Name 08/19/22 1621          Functional Assessment    Outcome Measure Options AM-PAC 6 Clicks Basic Mobility (PT) (P)   -           User Key  (r) = Recorded By, (t) = Taken By, (c) = Cosigned By    Initials Name Provider Type    KW Anabel Cuevas, RN Registered Nurse     Alan Bang, PT Student PT Student                             Physical Therapy Education                 Title: PT OT SLP Therapies (Done)     Topic: Physical Therapy (Done)     Point: Mobility training (Done)     Learning Progress Summary           Patient Acceptance, E, VU,DU by  at 8/19/2022 1601   Family Acceptance, E, VU,DU by  at 8/19/2022 1601                   Point: Home exercise program (Done)     Learning Progress Summary           Patient Acceptance, E, VU,DU by  at 8/19/2022 1601   Family Acceptance, E, VU,DU by  at 8/19/2022 1601                   Point: Body mechanics (Done)     Learning Progress Summary           Patient Acceptance, E, VU,DU by  at 8/19/2022 1601   Family Acceptance, E, VU,DU by  at 8/19/2022 1601                   Point: Precautions (Done)     Learning Progress Summary           Patient Acceptance, E, VU,DU by  at 8/19/2022 1601   Family Acceptance, E, VU,DU by  at 8/19/2022 1601                               User Key     Initials Effective Dates Name Provider Type Discipline     08/15/22 -  Alan Bang, PT Student PT Student PT              PT Recommendation and Plan  Planned Therapy Interventions (PT): (P) gait training, home exercise program, ROM (range of motion), strengthening, transfer training, balance training, bed mobility training, patient/family education  Plan of Care Reviewed With: (P) patient, spouse  Outcome Evaluation: (P) Pt is a 71 yo F presenting to therapy post op I/D and poly exchange following infection of previous knee  surgery. At this time the pt is able to rest comfortably but has increased pain during activity based interventions. Regardless of pain increase the pt is able to complete all interventions of TKA protocol. Pt demonstrated decreased functional mobility resulting from pain, decrease ROM, impaired gait, and decreased strength. The pt feels that SNF is needed to continue progressing towards PLOF. Skilled therapy services would benefit the pt return to PLOF. PT services should focus on pain, gait, strength, and functional mobility.     Time Calculation:    PT Charges     Row Name 08/19/22 1559             Time Calculation    Start Time 1540 (P)   -      Stop Time 1555 (P)   -      Time Calculation (min) 15 min (P)   -SM      PT Received On 08/19/22 (P)   -      PT - Next Appointment 08/20/22 (P)   -      PT Goal Re-Cert Due Date 08/26/22 (P)   -              Time Calculation- PT    Total Timed Code Minutes- PT 10 minute(s) (P)   -              Timed Charges    69398 - PT Therapeutic Activity Minutes 10 (P)   -SM              Total Minutes    Timed Charges Total Minutes 10 (P)   -SM       Total Minutes 10 (P)   -SM            User Key  (r) = Recorded By, (t) = Taken By, (c) = Cosigned By    Initials Name Provider Type     Alan Bang, PT Student PT Student              Therapy Charges for Today     Code Description Service Date Service Provider Modifiers Qty    [id number]  PT THERAPEUTIC ACT EA 15 MIN 8/19/2022 Alan Bang, PT Student GP 1    [id number]  PT EVAL MOD COMPLEXITY 2 8/19/2022 Alan Bang, PT Student GP 1          PT G-Codes  Outcome Measure Options: (P) AM-PAC 6 Clicks Basic Mobility (PT)  AM-PAC 6 Clicks Score (PT): (P) 17    Alan Bang PT Student  8/19/2022

## 2022-08-20 LAB
ALBUMIN SERPL-MCNC: 2.8 G/DL (ref 3.5–5.2)
ALBUMIN/GLOB SERPL: 0.9 G/DL
ALP SERPL-CCNC: 68 U/L (ref 39–117)
ALT SERPL W P-5'-P-CCNC: 21 U/L (ref 1–33)
ANION GAP SERPL CALCULATED.3IONS-SCNC: 9.4 MMOL/L (ref 5–15)
AST SERPL-CCNC: 18 U/L (ref 1–32)
BACTERIA SPEC AEROBE CULT: ABNORMAL
BASOPHILS # BLD AUTO: 0.02 10*3/MM3 (ref 0–0.2)
BASOPHILS NFR BLD AUTO: 0.2 % (ref 0–1.5)
BILIRUB SERPL-MCNC: 0.5 MG/DL (ref 0–1.2)
BUN SERPL-MCNC: 27 MG/DL (ref 8–23)
BUN/CREAT SERPL: 18.2 (ref 7–25)
CALCIUM SPEC-SCNC: 10 MG/DL (ref 8.6–10.5)
CHLORIDE SERPL-SCNC: 95 MMOL/L (ref 98–107)
CO2 SERPL-SCNC: 25.6 MMOL/L (ref 22–29)
CREAT SERPL-MCNC: 1.48 MG/DL (ref 0.57–1)
CRP SERPL-MCNC: 15.34 MG/DL (ref 0–0.5)
DEPRECATED RDW RBC AUTO: 41 FL (ref 37–54)
EGFRCR SERPLBLD CKD-EPI 2021: 37.9 ML/MIN/1.73
EOSINOPHIL # BLD AUTO: 0.11 10*3/MM3 (ref 0–0.4)
EOSINOPHIL NFR BLD AUTO: 1.3 % (ref 0.3–6.2)
ERYTHROCYTE [DISTWIDTH] IN BLOOD BY AUTOMATED COUNT: 12.4 % (ref 12.3–15.4)
ERYTHROCYTE [SEDIMENTATION RATE] IN BLOOD: 57 MM/HR (ref 0–30)
GLOBULIN UR ELPH-MCNC: 3.2 GM/DL
GLUCOSE SERPL-MCNC: 103 MG/DL (ref 65–99)
GRAM STN SPEC: ABNORMAL
HCT VFR BLD AUTO: 24 % (ref 34–46.6)
HGB BLD-MCNC: 7.8 G/DL (ref 12–15.9)
IMM GRANULOCYTES # BLD AUTO: 0.07 10*3/MM3 (ref 0–0.05)
IMM GRANULOCYTES NFR BLD AUTO: 0.8 % (ref 0–0.5)
INR PPP: 1.27 (ref 0.9–1.1)
LYMPHOCYTES # BLD AUTO: 0.88 10*3/MM3 (ref 0.7–3.1)
LYMPHOCYTES NFR BLD AUTO: 10.4 % (ref 19.6–45.3)
MCH RBC QN AUTO: 29.5 PG (ref 26.6–33)
MCHC RBC AUTO-ENTMCNC: 32.5 G/DL (ref 31.5–35.7)
MCV RBC AUTO: 90.9 FL (ref 79–97)
MONOCYTES # BLD AUTO: 1 10*3/MM3 (ref 0.1–0.9)
MONOCYTES NFR BLD AUTO: 11.8 % (ref 5–12)
NEUTROPHILS NFR BLD AUTO: 6.36 10*3/MM3 (ref 1.7–7)
NEUTROPHILS NFR BLD AUTO: 75.5 % (ref 42.7–76)
NRBC BLD AUTO-RTO: 0 /100 WBC (ref 0–0.2)
PLATELET # BLD AUTO: 266 10*3/MM3 (ref 140–450)
PMV BLD AUTO: 10.3 FL (ref 6–12)
POTASSIUM SERPL-SCNC: 4.7 MMOL/L (ref 3.5–5.2)
PROT SERPL-MCNC: 6 G/DL (ref 6–8.5)
PROTHROMBIN TIME: 15.7 SECONDS (ref 11.7–14.2)
RBC # BLD AUTO: 2.64 10*6/MM3 (ref 3.77–5.28)
SODIUM SERPL-SCNC: 130 MMOL/L (ref 136–145)
WBC NRBC COR # BLD: 8.44 10*3/MM3 (ref 3.4–10.8)

## 2022-08-20 PROCEDURE — 85025 COMPLETE CBC W/AUTO DIFF WBC: CPT | Performed by: INTERNAL MEDICINE

## 2022-08-20 PROCEDURE — 97530 THERAPEUTIC ACTIVITIES: CPT

## 2022-08-20 PROCEDURE — 86140 C-REACTIVE PROTEIN: CPT | Performed by: ORTHOPAEDIC SURGERY

## 2022-08-20 PROCEDURE — 80053 COMPREHEN METABOLIC PANEL: CPT | Performed by: INTERNAL MEDICINE

## 2022-08-20 PROCEDURE — 85610 PROTHROMBIN TIME: CPT | Performed by: ORTHOPAEDIC SURGERY

## 2022-08-20 PROCEDURE — 25010000002 PIPERACILLIN SOD-TAZOBACTAM PER 1 G: Performed by: INTERNAL MEDICINE

## 2022-08-20 PROCEDURE — 85652 RBC SED RATE AUTOMATED: CPT | Performed by: INTERNAL MEDICINE

## 2022-08-20 PROCEDURE — 99232 SBSQ HOSP IP/OBS MODERATE 35: CPT | Performed by: INTERNAL MEDICINE

## 2022-08-20 RX ORDER — WARFARIN SODIUM 10 MG/1
10 TABLET ORAL
Status: COMPLETED | OUTPATIENT
Start: 2022-08-20 | End: 2022-08-20

## 2022-08-20 RX ORDER — WARFARIN SODIUM 5 MG/1
7.5 TABLET ORAL
Status: DISCONTINUED | OUTPATIENT
Start: 2022-08-22 | End: 2022-08-22 | Stop reason: HOSPADM

## 2022-08-20 RX ADMIN — WARFARIN 10 MG: 10 TABLET ORAL at 17:16

## 2022-08-20 RX ADMIN — HYDROCODONE BITARTRATE AND ACETAMINOPHEN 2 TABLET: 10; 325 TABLET ORAL at 17:16

## 2022-08-20 RX ADMIN — HYDRALAZINE HYDROCHLORIDE 25 MG: 25 TABLET, FILM COATED ORAL at 09:07

## 2022-08-20 RX ADMIN — TAZOBACTAM SODIUM AND PIPERACILLIN SODIUM 3.38 G: 375; 3 INJECTION, SOLUTION INTRAVENOUS at 12:09

## 2022-08-20 RX ADMIN — METOPROLOL TARTRATE 50 MG: 50 TABLET, FILM COATED ORAL at 19:39

## 2022-08-20 RX ADMIN — MECLIZINE HYDROCHLORIDE 25 MG: 25 TABLET ORAL at 09:07

## 2022-08-20 RX ADMIN — METOPROLOL TARTRATE 50 MG: 50 TABLET, FILM COATED ORAL at 09:07

## 2022-08-20 RX ADMIN — HYDROCODONE BITARTRATE AND ACETAMINOPHEN 2 TABLET: 10; 325 TABLET ORAL at 13:09

## 2022-08-20 RX ADMIN — HYDROCODONE BITARTRATE AND ACETAMINOPHEN 2 TABLET: 10; 325 TABLET ORAL at 04:12

## 2022-08-20 RX ADMIN — TAZOBACTAM SODIUM AND PIPERACILLIN SODIUM 3.38 G: 375; 3 INJECTION, SOLUTION INTRAVENOUS at 19:39

## 2022-08-20 RX ADMIN — TAZOBACTAM SODIUM AND PIPERACILLIN SODIUM 3.38 G: 375; 3 INJECTION, SOLUTION INTRAVENOUS at 01:30

## 2022-08-20 RX ADMIN — HYDROCODONE BITARTRATE AND ACETAMINOPHEN 2 TABLET: 10; 325 TABLET ORAL at 21:39

## 2022-08-20 RX ADMIN — HYDRALAZINE HYDROCHLORIDE 25 MG: 25 TABLET, FILM COATED ORAL at 19:39

## 2022-08-20 RX ADMIN — HYDROCODONE BITARTRATE AND ACETAMINOPHEN 2 TABLET: 10; 325 TABLET ORAL at 09:07

## 2022-08-20 NOTE — NURSING NOTE
PICC consent, education and agreement signed. Dr May has noted renal function and wishes to proceed. IV nurse notified via voice message.

## 2022-08-20 NOTE — THERAPY TREATMENT NOTE
Patient Name: Fe Arevalo  : 1950    MRN: [id number]                              Today's Date: 2022       Admit Date: 2022    Visit Dx:     ICD-10-CM ICD-9-CM   1. Status post knee replacement, unspecified laterality  Z96.659 V43.65     Patient Active Problem List   Diagnosis   • Arthritis of ankle   • Wound dehiscence   • Pain and swelling of ankle   • Status post knee replacement   • Status post knee replacement, unspecified laterality     Past Medical History:   Diagnosis Date   • A-fib (HCC)    • Histoplasmosis     STATES BILAT EYES:  INJECTIONS INTO HER EYES BILAT EVERY 7 WEEKS.  DENIES VISION LOSS   • History of blood clots 2021    PT STATES WAS DIAGNOSED WITH BLOOD CLOT BEHIND HER HEART AUG 2021-PT STATES THIS IS RESOLVED AND HAD CARDIAC ABLATION 2022   • Hypertension    • Lab test positive for detection of COVID-19 virus 2022   • Osteoarthritis of right knee    • Osteomyelitis (HCC) 2006    MRSA RIGHT FOOT   • PONV (postoperative nausea and vomiting)    • Right knee pain     WEAKNESS AND LIMITED MOBILITY     Past Surgical History:   Procedure Laterality Date   • CARDIAC ABLATION  04/29/2022    X2.  PT HAS ATRICLIP DANISHA EXCLUSION SYSTEM IN PLACE THAT PT STATES IS NOT WORKING   • CHOLECYSTECTOMY     • COLONOSCOPY     • FOOT SURGERY Right     x7 DT OSTEOMYELITIS   • HYSTERECTOMY     • KNEE MINI REVISION Right 2022    Procedure: KNEE POLY INSERT EXCHANGE;  Surgeon: Nacho Hammonds II, MD;  Location: [facility];  Service: Orthopedics;  Laterality: Right;   • TOTAL KNEE ARTHROPLASTY Right 7/15/2022    Procedure: TOTAL KNEE ARTHROPLASTY WITH ROBOT;  Surgeon: Nacho Hammonds II, MD;  Location: [facility];  Service: Robotics - Ortho;  Laterality: Right;   •  SHUNT INSERTION        General Information     Row Name 22 1714          Physical Therapy Time and Intention    Document Type therapy note (daily note)  -RS     Mode of Treatment  individual therapy;physical therapy  -RS           User Key  (r) = Recorded By, (t) = Taken By, (c) = Cosigned By    Initials Name Provider Type    RS Stephania Will PT Physical Therapist               Mobility     Row Name 08/20/22 1714          Bed Mobility    Bed Mobility supine-sit;supine-sit-supine  -RS     Supine-Sit [place] (Bed Mobility) standby assist  -RS     Supine-Sit-Supine [place] (Bed Mobility) standby assist  -RS     Assistive Device (Bed Mobility) bed rails  -RS     Row Name 08/20/22 1714          Sit-Stand Transfer    Sit-Stand [place]cock (Transfers) verbal cues;contact guard  -RS     Assistive Device (Sit-Stand Transfers) walker, front-wheeled  -RS     Row Name 08/20/22 1714          Gait/Stairs (Locomotion)    Glasscock Level (Gait) verbal cues;contact guard  -RS     Assistive Device (Gait) walker, front-wheeled  -RS     Distance in Feet (Gait) 60 ft  -RS     Deviations/Abnormal Patterns (Gait) right sided deviations;antalgic;gait speed decreased;stride length decreased;weight shifting decreased  -RS     Right Sided Gait Deviations decreased knee extension;weight shift ability decreased  -RS           User Key  (r) = Recorded By, (t) = Taken By, (c) = Cosigned By    Initials Name Provider Type    RS Stephania Will PT Physical Therapist               Obj/Interventions     Row Name 08/20/22 1715          Motor Skills    Therapeutic Exercise --  heel slide, SLR, QS, AP x 10  -RS           User Key  (r) = Recorded By, (t) = Taken By, (c) = Cosigned By    Initials Name Provider Type    RS Stephania Will PT Physical Therapist               Goals/Plan    No documentation.                Clinical Impression     Row Name 08/20/22 1715          Plan of Care Review    Plan of Care Reviewed With patient  -RS     Progress improving  -RS     Outcome Evaluation Pt motivated to participate with PT this date reporting pain control has improved. She mabultaes 60ft with CGA and performed  bed mobility with SBA. She requires cues for placement during STS transfer and remains appropriate for skilled PT.  -RS     Row Name 08/20/22 1715          Positioning and Restraints    Pre-Treatment Position in bed  -RS     Post Treatment Position bed  -RS     In Bed supine;call light within reach;encouraged to call for assist;exit alarm on  -RS           User Key  (r) = Recorded By, (t) = Taken By, (c) = Cosigned By    Initials Name Provider Type    Stephania Nance PT Physical Therapist               Outcome Measures     Row Name 08/20/22 1717          How much help from another person do you currently need...    Turning from your back to your side while in flat bed without using bedrails? 4  -RS     Moving from lying on back to sitting on the side of a flat bed without bedrails? 3  -RS     Moving to and from a bed to a chair (including a wheelchair)? 3  -RS     Standing up from a chair using your arms (e.g., wheelchair, bedside chair)? 3  -RS     Climbing 3-5 steps with a railing? 2  -RS     To walk in hospital room? 3  -RS     AM-PAC 6 Clicks Score (PT) 18  -RS     Highest level of mobility 6 --> Walked 10 steps or more  -RS           User Key  (r) = Recorded By, (t) = Taken By, (c) = Cosigned By    Initials Name Provider Type    Stephania Nance PT Physical Therapist                             Physical Therapy Education                 Title: PT OT SLP Therapies (Done)     Topic: Physical Therapy (Done)     Point: Mobility training (Done)     Learning Progress Summary           Patient Acceptance, E, VU by NASREEN at 8/20/2022 1717    Acceptance, E, VU,DU by  at 8/19/2022 1601   Family Acceptance, E, VU,DU by  at 8/19/2022 1601                   Point: Home exercise program (Done)     Learning Progress Summary           Patient Acceptance, E, VU by RS at 8/20/2022 1717    Acceptance, E, VU,DU by  at 8/19/2022 1601   Family Acceptance, E, VU,DU by  at 8/19/2022 1601                   Point: Body  mechanics (Done)     Learning Progress Summary           Patient Acceptance, E, VU by  at 8/20/2022 1717    Acceptance, E, VU,DU by  at 8/19/2022 1601   Family Acceptance, E, VU,DU by  at 8/19/2022 1601                   Point: Precautions (Done)     Learning Progress Summary           Patient Acceptance, E, VU by  at 8/20/2022 1717    Acceptance, E, VU,DU by  at 8/19/2022 1601   Family Acceptance, E, VU,DU by  at 8/19/2022 1601                               User Key     Initials Effective Dates Name Provider Type Discipline     06/16/21 -  Stephania Will, PT Physical Therapist PT     08/15/22 -  Alan Bang PT Student PT Student PT              PT Recommendation and Plan     Plan of Care Reviewed With: patient  Progress: improving  Outcome Evaluation: Pt motivated to participate with PT this date reporting pain control has improved. She mabultaes 60ft with CGA and performed bed mobility with SBA. She requires cues for placement during STS transfer and remains appropriate for skilled PT.     Time Calculation:    PT Charges     Row Name 08/20/22 1717             Time Calculation    Start Time 1559  -RS      Stop Time 1615  -RS      Time Calculation (min) 16 min  -RS      PT Received On 08/20/22  -RS      PT - Next Appointment 08/21/22  -RS              Timed Charges    61537 - PT Therapeutic Exercise Minutes 6  -RS      90047 - PT Therapeutic Activity Minutes 10  -RS              Total Minutes    Timed Charges Total Minutes 16  -RS       Total Minutes 16  -RS            User Key  (r) = Recorded By, (t) = Taken By, (c) = Cosigned By    Initials Name Provider Type     Stephania Will, SUNIL Physical Therapist              Therapy Charges for Today     Code Description Service Date Service Provider Modifiers Qty    [id number] HC PT THERAPEUTIC ACT EA 15 MIN 8/20/2022 Stephania Will PT GP 1          PT G-Codes  Outcome Measure Options: AM-PAC 6 Clicks Basic Mobility (PT)  AM-PAC 6 Clicks  Score (PT): 18    Stephania Will, PT  8/20/2022

## 2022-08-20 NOTE — PROGRESS NOTES
LOS: 3 days     Chief Complaint:  Right prosthetic knee septic arthritis    Interval History: Afebrile, no new complaints or events.  Tolerating antibiotics without abdominal pain nausea vomiting or diarrhea.  No rashes    Vital Signs  Temp:  [96.8 °F (36 °C)-99.4 °F (37.4 °C)] 96.8 °F (36 °C)  Heart Rate:  [63-76] 67  Resp:  [16] 16  BP: (104-151)/(46-64) 113/58    Physical Exam:  General: In no acute distress  Cardiovascular: RRR, no lower extremity edema  Respiratory: Breathing comfortably on room air  GI: Soft, NT/ND, + bowel sounds bilaterally  Skin: No rashes   Extremities: Right knee covered in dressing    Antibiotics:  Zosyn 3.375 g IV every 8 hours     Results Review:    Lab Results   Component Value Date    WBC 8.44 08/20/2022    HGB 7.8 (L) 08/20/2022    HCT 24.0 (L) 08/20/2022    MCV 90.9 08/20/2022     08/20/2022     Lab Results   Component Value Date    GLUCOSE 103 (H) 08/20/2022    BUN 27 (H) 08/20/2022    CREATININE 1.48 (H) 08/20/2022    BCR 18.2 08/20/2022    CO2 25.6 08/20/2022    CALCIUM 10.0 08/20/2022    ALBUMIN 2.80 (L) 08/20/2022    LABIL2 1.2 01/22/2019    AST 18 08/20/2022    ALT 21 08/20/2022       Microbiology:  8/18 OR cx Pseudomonas and Providencia  8/16 right knee superficial wound cultures Romanus, providencia, Enterococcus species  8/16 COVID neg     Assessment & Plan   Right prosthetic knee septic arthritis    Operative cultures with Pseudomonas and Providencia rettgeri.  The plan will be for a 6-week course of IV Zosyn     Final antibiotic recommendations  1.  Zosyn 3.375 g IV every 8 hours x6 weeks.  Antibiotic stop date September 29    2.  Please monitor weekly CBC with differential and creatinine and fax the results to the infectious disease clinic at [phone]    3.  We will arrange for ID follow-up on September 28    I will order a PICC line.    ID will sign off.  Please do not hesitate to call us with further questions or concerns

## 2022-08-20 NOTE — CASE MANAGEMENT/SOCIAL WORK
Continued Stay Note  [facility]     Patient Name: Fe Arevalo  MRN: [id number]  Today's Date: 8/20/2022    Admit Date: 8/16/2022     Discharge Plan     Row Name 08/20/22 1053       Plan    [facility], via private auto    Patient/Family in Agreement with Plan yes    [facility] ([phone][facility] who stated she received a referral for SNF for Fe Arevalo and to call her this weekend regarding it. [facility] and she provided fax number for clinicals ([phone]) to be faxed to her. [facility] states they will have a bed available for pt on Monday. Called and updated pt and she is agreeable to DC plan. Pt states her family will be able to drive her to SNF. Updated staff RN. CCP to follow...........JW               Discharge Codes    No documentation.               Expected Discharge Date and Time     Expected Discharge Date Expected Discharge Time    Aug 20, 2022             Thea Mendez, RN

## 2022-08-20 NOTE — PROGRESS NOTES
Dressing clean dry and intact.  Drain with nothing out overnight.  Dressing to be changed and drain to be pulled today.  Cultures with multiple bugs including Pseudomonas.  I appreciate infectious disease assistance with this.  Plan will be for 6 weeks IV antibiotics followed by oral antibiotics.  Patient to go to rehab when bed is available and after cultures from surgery are finalized, likely on Monday.   present x 4 quadrants

## 2022-08-20 NOTE — PLAN OF CARE
Problem: Adult Inpatient Plan of Care  Goal: Plan of Care Review  Outcome: Ongoing, Progressing  Flowsheets  Taken 8/20/2022 1932 by Teagan James, RN  Plan of Care Reviewed With:   patient   spouse  Outcome Evaluation: Vitals as documented. Taking PO well. Up to BR with walker, gait belt and one assist. Denies CP and SOA. No s/sx of distress. PRN pain medication effective for pain control. Offered / assessed when available. Ice packs replaced / in place. Moderete swelling and bruising noted to right knee. MATTHEW dressing CDI. Monitoring.  Taken 8/20/2022 1715 by Stephania Will, PT  Progress: improving  Goal: Patient-Specific Goal (Individualized)  Outcome: Ongoing, Progressing  Goal: Absence of Hospital-Acquired Illness or Injury  Outcome: Ongoing, Progressing  Intervention: Identify and Manage Fall Risk  Description: Perform standard risk assessment on admission using a validated tool or comprehensive approach appropriate to the patient; reassess fall risk frequently, with change in status or transfer to another level of care.  Communicate fall injury risk to interprofessional healthcare team.  Determine need for increased observation, equipment and environmental modification, such as low bed, signage and supportive, nonskid footwear.  Adjust safety measures to individual developmental age, stage and identified risk factors.  Reinforce the importance of safety and physical activity with patient and family.  Perform regular intentional rounding to assess need for position change, pain assessment and personal needs, including assistance with toileting.  Recent Flowsheet Documentation  Taken 8/20/2022 1800 by Teagan James, RN  Safety Promotion/Fall Prevention:   assistive device/personal items within reach   clutter free environment maintained   fall prevention program maintained   nonskid shoes/slippers when out of bed   room organization consistent   safety round/check completed  Taken 8/20/2022 1716 by  Teagan James, RN  Safety Promotion/Fall Prevention:   assistive device/personal items within reach   clutter free environment maintained   fall prevention program maintained   nonskid shoes/slippers when out of bed   room organization consistent   safety round/check completed  Taken 8/20/2022 1600 by Teagan James, RN  Safety Promotion/Fall Prevention:   assistive device/personal items within reach   clutter free environment maintained   fall prevention program maintained   nonskid shoes/slippers when out of bed   safety round/check completed   room organization consistent  Taken 8/20/2022 1309 by Teagan James, RN  Safety Promotion/Fall Prevention:   assistive device/personal items within reach   clutter free environment maintained   fall prevention program maintained   nonskid shoes/slippers when out of bed   room organization consistent   safety round/check completed  Taken 8/20/2022 1200 by Teagan James, RN  Safety Promotion/Fall Prevention:   assistive device/personal items within reach   clutter free environment maintained   fall prevention program maintained   nonskid shoes/slippers when out of bed   safety round/check completed   room organization consistent  Taken 8/20/2022 1145 by Teagan James, RN  Safety Promotion/Fall Prevention:   assistive device/personal items within reach   clutter free environment maintained   fall prevention program maintained   nonskid shoes/slippers when out of bed   room organization consistent   safety round/check completed   lighting adjusted  Taken 8/20/2022 1000 by Teagan James, RN  Safety Promotion/Fall Prevention:   assistive device/personal items within reach   clutter free environment maintained   fall prevention program maintained   nonskid shoes/slippers when out of bed   room organization consistent   safety round/check completed   lighting adjusted  Taken 8/20/2022 0907 by Teagan James, RN  Safety Promotion/Fall Prevention:   assistive device/personal  items within reach   clutter free environment maintained   fall prevention program maintained   nonskid shoes/slippers when out of bed   room organization consistent   safety round/check completed   lighting adjusted  Taken 8/20/2022 0810 by Teagan James RN  Safety Promotion/Fall Prevention:   assistive device/personal items within reach   clutter free environment maintained   activity supervised   fall prevention program maintained   nonskid shoes/slippers when out of bed   room organization consistent   safety round/check completed  Intervention: Prevent Skin Injury  Description: Perform a screening for skin injury risk, such as pressure or moisture associated skin damage on admission and at regular intervals throughout hospital stay.  Keep all areas of skin (especially folds) clean and dry.  Maintain adequate skin hydration.  Relieve and redistribute pressure and protect bony prominences; implement measures based on patient-specific risk factors.  Match turning and repositioning schedule to clinical condition.  Encourage weight shift frequently; assist with reposition if unable to complete independently.  Float heels off bed; avoid pressure on the Achilles tendon.  Keep skin free from extended contact with medical devices.  Encourage functional activity and mobility, as early as tolerated.  Use aids (e.g., slide boards, mechanical lift) during transfer.  Recent Flowsheet Documentation  Taken 8/20/2022 1145 by Teagan James, RN  Body Position:   neutral body alignment   neutral head position  Taken 8/20/2022 0907 by Teagan James RN  Body Position:   position changed independently   neutral body alignment   neutral head position  Taken 8/20/2022 0810 by Teagan James RN  Body Position:   position changed independently   neutral body alignment   neutral head position   right   tilted  Skin Protection:   adhesive use limited   tubing/devices free from skin contact  Intervention: Prevent and Manage VTE  (Venous Thromboembolism) Risk  Description: Assess for VTE (venous thromboembolism) risk.  Encourage and assist with early ambulation.  Initiate and maintain compression or other therapy, as indicated, based on identified risk in accordance with organizational protocol and provider order.  Encourage both active and passive leg exercises while in bed, if unable to ambulate.  Recent Flowsheet Documentation  Taken 8/20/2022 1145 by Teagan James RN  Activity Management: activity adjusted per tolerance  Taken 8/20/2022 0907 by Teagan James RN  Activity Management:   activity adjusted per tolerance   sitting, edge of bed  Taken 8/20/2022 0810 by Teagan James RN  Activity Management: activity adjusted per tolerance  VTE Prevention/Management:   bilateral   sequential compression devices on  Intervention: Prevent Infection  Description: Maintain skin and mucous membrane integrity; promote hand, oral and pulmonary hygiene.  Optimize fluid balance, nutrition, sleep and glycemic control to maximize infection resistance.  Identify potential sources of infection early to prevent or mitigate progression of infection (e.g., wound, lines, devices).  Evaluate ongoing need for invasive devices; remove promptly when no longer indicated.  Recent Flowsheet Documentation  Taken 8/20/2022 1800 by Teagan James, RN  Infection Prevention:   environmental surveillance performed   equipment surfaces disinfected   hand hygiene promoted   personal protective equipment utilized   rest/sleep promoted   single patient room provided   visitors restricted/screened  Taken 8/20/2022 1716 by Teagan James RN  Infection Prevention:   environmental surveillance performed   equipment surfaces disinfected   hand hygiene promoted   personal protective equipment utilized   rest/sleep promoted   single patient room provided   visitors restricted/screened  Taken 8/20/2022 1600 by Teagan James RN  Infection Prevention:   environmental  surveillance performed   equipment surfaces disinfected   hand hygiene promoted   personal protective equipment utilized   rest/sleep promoted   single patient room provided   visitors restricted/screened  Taken 8/20/2022 1400 by Teagan James RN  Infection Prevention:   environmental surveillance performed   equipment surfaces disinfected   hand hygiene promoted   personal protective equipment utilized   rest/sleep promoted   single patient room provided   visitors restricted/screened  Taken 8/20/2022 1309 by Teagan James RN  Infection Prevention:   environmental surveillance performed   equipment surfaces disinfected   hand hygiene promoted   personal protective equipment utilized   rest/sleep promoted   single patient room provided   visitors restricted/screened  Taken 8/20/2022 1200 by Teagan James RN  Infection Prevention:   environmental surveillance performed   equipment surfaces disinfected   hand hygiene promoted   personal protective equipment utilized   rest/sleep promoted   single patient room provided   visitors restricted/screened  Taken 8/20/2022 1145 by Teagan James RN  Infection Prevention:   environmental surveillance performed   equipment surfaces disinfected   hand hygiene promoted   personal protective equipment utilized   rest/sleep promoted   single patient room provided   visitors restricted/screened  Taken 8/20/2022 1000 by Teagan James RN  Infection Prevention:   environmental surveillance performed   equipment surfaces disinfected   hand hygiene promoted   personal protective equipment utilized   rest/sleep promoted   single patient room provided   visitors restricted/screened  Taken 8/20/2022 0907 by Teagan James RN  Infection Prevention:   environmental surveillance performed   equipment surfaces disinfected   hand hygiene promoted   personal protective equipment utilized   rest/sleep promoted   single patient room provided   visitors restricted/screened  Taken 8/20/2022  0810 by Teagan James, RN  Infection Prevention:   environmental surveillance performed   equipment surfaces disinfected   hand hygiene promoted   personal protective equipment utilized   rest/sleep promoted   single patient room provided   visitors restricted/screened  Goal: Optimal Comfort and Wellbeing  Outcome: Ongoing, Progressing  Intervention: Monitor Pain and Promote Comfort  Description: Assess pain level, treatment efficacy and patient response at regular intervals using a consistent pain scale.  Consider the presence and impact of preexisting chronic pain.  Encourage patient and caregiver involvement in pain assessment, interventions and safety measures.  Recent Flowsheet Documentation  Taken 8/20/2022 1716 by Teagan James, RN  Pain Management Interventions:   around-the-clock dosing utilized   care clustered   cold applied   diversional activity provided   pillow support provided   position adjusted   quiet environment facilitated   see MAR  Taken 8/20/2022 1309 by Teagan James, RN  Pain Management Interventions:   around-the-clock dosing utilized   care clustered   cold applied   diversional activity provided   pillow support provided   position adjusted   quiet environment facilitated   see MAR  Taken 8/20/2022 0907 by Teagan James, RN  Pain Management Interventions:   care clustered   diversional activity provided   cold applied   pillow support provided   position adjusted   see MAR   quiet environment facilitated  Intervention: Provide Person-Centered Care  Description: Use a family-focused approach to care.  Develop trust and rapport by proactively providing information, encouraging questions, addressing concerns and offering reassurance.  Acknowledge emotional response to hospitalization.  Recognize and utilize personal coping strategies.  Honor spiritual and cultural preferences.  Recent Flowsheet Documentation  Taken 8/20/2022 0810 by Teagan James, RN  Trust Relationship/Rapport:   care  explained   choices provided   empathic listening provided   emotional support provided   questions answered   questions encouraged   thoughts/feelings acknowledged   reassurance provided  Goal: Readiness for Transition of Care  Outcome: Ongoing, Progressing     Problem: Pain Acute  Goal: Acceptable Pain Control and Functional Ability  Outcome: Ongoing, Progressing  Intervention: Prevent or Manage Pain  Description: Evaluate pain level, effect of treatment and patient response at regular intervals.  Minimize painful stimuli; coordinate care and adjust environment (e.g., light, noise, unnecessary movement); promote sleep/rest.  Match pharmacologic analgesia to severity and type of pain mechanism (e.g., neuropathic, muscle, inflammatory); consider multimodal approach (e.g., nonopioid, opioid, adjuvant).  Provide medication at regular intervals; titrate to patient response; premedicate for painful procedures.  Manage breakthrough pain with additional doses; consider rotation or switching medication.  Monitor for signs of substance tolerance (increased dose to reach desired effect, decreased effect with same dose).  Manage medication-induced effects, such as constipation, nausea, pruritus, urinary retention, somnolence and dizziness.  Provide multimodal interventions, such as as physical activity, therapeutic exercise, yoga, TENS (transcutaneous electrical nerve stimulation) and manual therapy.  Train in functional activity modifications, such as body mechanics, posture, ergonomics, energy conservation and activity pacing.  Consider addition of complementary or alternative therapy, such as acupuncture, hypnosis or therapeutic touch.  Recent Flowsheet Documentation  Taken 8/20/2022 1800 by Teagan James, RN  Medication Review/Management:   high-risk medications identified   medications reviewed  Taken 8/20/2022 1716 by Teagan James, RN  Medication Review/Management:   medications reviewed   high-risk medications  identified  Taken 8/20/2022 1600 by Teagan James RN  Medication Review/Management:   medications reviewed   high-risk medications identified  Taken 8/20/2022 1200 by Teagan James RN  Medication Review/Management:   medications reviewed   high-risk medications identified  Taken 8/20/2022 1145 by Teagan James RN  Medication Review/Management:   medications reviewed   high-risk medications identified  Taken 8/20/2022 1000 by Teagan James RN  Medication Review/Management:   medications reviewed   high-risk medications identified  Taken 8/20/2022 0907 by Teagan James RN  Medication Review/Management:   medications reviewed   high-risk medications identified  Taken 8/20/2022 0810 by Teagan James RN  Medication Review/Management:   medications reviewed   high-risk medications identified  Intervention: Develop Pain Management Plan  Description: Acknowledge patient as the expert in pain self-management.  Use a consistent, validated tool for pain assessment; include function and quality of life.  Evaluate risk for opioid use and dependence.  Set pain management goals; determine acceptable level of discomfort to allow for maximal functioning.  Determine [id number] pain management plan, including both pharmacologic and nonpharmacologic measures; integrate management of chronic (persistent) pain.  Identify and integrate past successful treatment measures, if able.  Encourage patient and caregiver involvement in pain assessment, interventions and safety measures.  Re-evaluate plan regularly.  Recent Flowsheet Documentation  Taken 8/20/2022 1716 by Teagna James RN  Pain Management Interventions:   around-the-clock dosing utilized   care clustered   cold applied   diversional activity provided   pillow support provided   position adjusted   quiet environment facilitated   see MAR  Taken 8/20/2022 1309 by Teagan James RN  Pain Management Interventions:   around-the-clock dosing utilized   care  clustered   cold applied   diversional activity provided   pillow support provided   position adjusted   quiet environment facilitated   see MAR  Taken 8/20/2022 0907 by Teagan James, RN  Pain Management Interventions:   care clustered   diversional activity provided   cold applied   pillow support provided   position adjusted   see MAR   quiet environment facilitated     Problem: Fall Injury Risk  Goal: Absence of Fall and Fall-Related Injury  Outcome: Ongoing, Progressing  Intervention: Identify and Manage Contributors  Description: Develop a fall prevention plan with the patient and caregiver/family.  Provide reorientation, appropriate sensory stimulation and routines with changes in mental status to decrease risk of fall.  Promote use of personal vision and auditory aids.  Assess assistance level required for safe and effective self-care; provide support as needed, such as toileting, mobilization. For age 65 and older, implement timed toileting with assistance.  Encourage physical activity, such as performance of mobility and self-care at highest level of patient ability, multicomponent exercise program and provision of appropriate assistive devices.  If fall occurs, assess the severity of injury; implement fall injury protocol. Determine the cause and revise fall injury prevention plan.  Regularly review medication contribution to fall risk; adjust medication administration times to minimize risk of falling.  Consider risk related to polypharmacy and age.  Balance adequate pain management with potential for oversedation.  Recent Flowsheet Documentation  Taken 8/20/2022 1800 by Teagan James, RN  Medication Review/Management:   high-risk medications identified   medications reviewed  Taken 8/20/2022 1716 by Teagan James, RN  Medication Review/Management:   medications reviewed   high-risk medications identified  Taken 8/20/2022 1600 by Teagan James, RN  Medication Review/Management:   medications  reviewed   high-risk medications identified  Taken 8/20/2022 1200 by Teagan James RN  Medication Review/Management:   medications reviewed   high-risk medications identified  Taken 8/20/2022 1145 by Teagan James RN  Medication Review/Management:   medications reviewed   high-risk medications identified  Taken 8/20/2022 1000 by Teagan James RN  Medication Review/Management:   medications reviewed   high-risk medications identified  Taken 8/20/2022 0907 by Teagan James RN  Medication Review/Management:   medications reviewed   high-risk medications identified  Taken 8/20/2022 0810 by Teagan James RN  Medication Review/Management:   medications reviewed   high-risk medications identified  Intervention: Promote Injury-Free Environment  Description: Provide a safe, barrier-free environment that encourages independent activity.  Keep care area uncluttered and well-lighted.  Determine need for increased observation or monitoring.  Avoid use of devices that minimize mobility, such as restraints or indwelling urinary catheter.  Recent Flowsheet Documentation  Taken 8/20/2022 1800 by Teagan James RN  Safety Promotion/Fall Prevention:   assistive device/personal items within reach   clutter free environment maintained   fall prevention program maintained   nonskid shoes/slippers when out of bed   room organization consistent   safety round/check completed  Taken 8/20/2022 1716 by Teagan James, RN  Safety Promotion/Fall Prevention:   assistive device/personal items within reach   clutter free environment maintained   fall prevention program maintained   nonskid shoes/slippers when out of bed   room organization consistent   safety round/check completed  Taken 8/20/2022 1600 by Teagan James, RN  Safety Promotion/Fall Prevention:   assistive device/personal items within reach   clutter free environment maintained   fall prevention program maintained   nonskid shoes/slippers when out of bed   safety  round/check completed   room organization consistent  Taken 8/20/2022 1309 by Teagan James, RN  Safety Promotion/Fall Prevention:   assistive device/personal items within reach   clutter free environment maintained   fall prevention program maintained   nonskid shoes/slippers when out of bed   room organization consistent   safety round/check completed  Taken 8/20/2022 1200 by Teagan James, RN  Safety Promotion/Fall Prevention:   assistive device/personal items within reach   clutter free environment maintained   fall prevention program maintained   nonskid shoes/slippers when out of bed   safety round/check completed   room organization consistent  Taken 8/20/2022 1145 by Teagan James, RN  Safety Promotion/Fall Prevention:   assistive device/personal items within reach   clutter free environment maintained   fall prevention program maintained   nonskid shoes/slippers when out of bed   room organization consistent   safety round/check completed   lighting adjusted  Taken 8/20/2022 1000 by Teagan James, RN  Safety Promotion/Fall Prevention:   assistive device/personal items within reach   clutter free environment maintained   fall prevention program maintained   nonskid shoes/slippers when out of bed   room organization consistent   safety round/check completed   lighting adjusted  Taken 8/20/2022 0907 by Teagan James, RN  Safety Promotion/Fall Prevention:   assistive device/personal items within reach   clutter free environment maintained   fall prevention program maintained   nonskid shoes/slippers when out of bed   room organization consistent   safety round/check completed   lighting adjusted  Taken 8/20/2022 0810 by Teagan James, RN  Safety Promotion/Fall Prevention:   assistive device/personal items within reach   clutter free environment maintained   activity supervised   fall prevention program maintained   nonskid shoes/slippers when out of bed   room organization consistent   safety round/check  completed     Problem: Surgical Site Infection  Goal: Absence of Infection Signs and Symptoms  Outcome: Ongoing, Progressing  Intervention: Prevent or Manage Infection  Description: Implement transmission-based precautions and isolation, as indicated, to prevent spread of infection.  Obtain cultures prior to initiating antimicrobial therapy when possible. Do not delay for laboratory results in the presence of high suspicion or clinical indicators.  Administer ordered antimicrobial therapy promptly; reassess need regularly.  Monitor laboratory value, diagnostic test and clinical status trends for signs of infection progression.  Provide oxygen therapy judiciously to avoid hyperoxemia; adjust to achieve oxygenation goal.  Assess fluid requirements and intake; anticipate the need for fluid supplementation to maintain tissue perfusion and balance.  Perform a nutritional assessment and assess adequacy of oral intake; offer oral supplemental drinks; if unable to meet requirements through oral intake, consider enteral nutrition support.  Consider insulin therapy to optimize glycemic control using an insulin management protocol to avoid fluctuations in blood glucose levels.  Protect surgical site from injury, such as care with movement or splinting techniques.  Provide wound care to optimize healing.  Provide fever-reduction and comfort measures.  Promote early mobility or ambulation; match activity to ability and tolerance.  Identify early signs of sepsis, such as increased heart rate or temperature, as well as changes in mental state, respiratory pattern or peripheral perfusion.  Prepare for rapid sepsis management, including lactate level, intravenous access, fluid administration and oxygen therapy.  Recent Flowsheet Documentation  Taken 8/20/2022 1800 by Teagan James, RN  Isolation Precautions:   precautions maintained   protective  Taken 8/20/2022 1716 by Teagan James, RN  Infection Management: aseptic technique  maintained  Isolation Precautions:   precautions maintained   protective  Taken 8/20/2022 1600 by Teagan James RN  Isolation Precautions:   precautions maintained   protective  Taken 8/20/2022 1400 by Teagan James RN  Isolation Precautions:   precautions maintained   protective  Taken 8/20/2022 1309 by Teagan James RN  Isolation Precautions:   precautions maintained   protective  Taken 8/20/2022 1200 by Teagan James RN  Isolation Precautions:   precautions maintained   protective  Taken 8/20/2022 1145 by Teagan James RN  Isolation Precautions:   precautions maintained   protective  Taken 8/20/2022 1000 by Teagan James RN  Isolation Precautions:   precautions maintained   protective  Taken 8/20/2022 0907 by Teagan James RN  Isolation Precautions:   precautions maintained   protective  Taken 8/20/2022 0810 by Teagan James RN  Isolation Precautions:   precautions maintained   protective     Problem: Hypertension Comorbidity  Goal: Blood Pressure in Desired Range  Outcome: Ongoing, Progressing  Intervention: Maintain Blood Pressure Management  Description: Evaluate adherence to home antihypertensive regimen (e.g., exercise and activity, diet modification, medication).  Provide scheduled antihypertensive medication; consider administration time and effects (e.g., avoid giving diuretic prior to bedtime).  Monitor response to antihypertensive medication therapy (e.g., blood pressure, electrolyte levels, medication effects).  Minimize risk of orthostatic hypotension; encourage caution with position changes, particularly if elderly.  Recent Flowsheet Documentation  Taken 8/20/2022 1800 by Teagan James RN  Medication Review/Management:   high-risk medications identified   medications reviewed  Taken 8/20/2022 1716 by Teagan James RN  Medication Review/Management:   medications reviewed   high-risk medications identified  Taken 8/20/2022 1600 by Teagan James RN  Medication Review/Management:    medications reviewed   high-risk medications identified  Taken 8/20/2022 1200 by Teagan James RN  Medication Review/Management:   medications reviewed   high-risk medications identified  Taken 8/20/2022 1145 by Teagan James RN  Medication Review/Management:   medications reviewed   high-risk medications identified  Taken 8/20/2022 1000 by Teagan James RN  Medication Review/Management:   medications reviewed   high-risk medications identified  Taken 8/20/2022 0907 by Teagan James RN  Medication Review/Management:   medications reviewed   high-risk medications identified  Taken 8/20/2022 0810 by Teagan James RN  Medication Review/Management:   medications reviewed   high-risk medications identified     Problem: Adjustment to Surgery (Knee Arthroplasty)  Goal: Optimal Coping  Outcome: Ongoing, Progressing     Problem: Bleeding (Knee Arthroplasty)  Goal: Absence of Bleeding  Outcome: Ongoing, Progressing  Intervention: Monitor and Manage Bleeding  Description: Monitor and manage anticoagulation therapy.  Assess bleeding risk and presence of bleeding; review laboratory result trends, medical history and physical presentation.  Identify source of bleeding; consider potential for additional diagnostic testing. If able, apply direct pressure to visible bleeding site; notify healthcare provider.  Maintain body temperature within desired range to optimize clotting ability.  Maintain dressing integrity to avoid disrupting clotting process; reinforce as needed.  Monitor and measure drainage amount and characteristics from surgical site, drain and dressing.  Consider need for fluid volume replacement (e.g., intravenous fluid, blood products) to maintain perfusion.  Evaluate for orthostatic hypotension prior to activity.  Consider need for pharmacologic treatment, such as topical hemostatic, coagulation factor concentrate or antifibrinolytic.  Anticipate need for surgical intervention with continued bleeding and  circulatory instability.  Recent Flowsheet Documentation  Taken 8/20/2022 1800 by Teagan James RN  Bleeding Management: dressing monitored  Taken 8/20/2022 1716 by Teagan James RN  Bleeding Management: dressing monitored  Taken 8/20/2022 1600 by Teagan James RN  Bleeding Management: dressing monitored  Taken 8/20/2022 1200 by Teagan James RN  Bleeding Management: dressing monitored  Taken 8/20/2022 1145 by Teagan James RN  Bleeding Management: dressing monitored  Taken 8/20/2022 1000 by Teagan James RN  Bleeding Management: dressing monitored  Taken 8/20/2022 0907 by Teagan James RN  Bleeding Management: dressing monitored  Taken 8/20/2022 0810 by Teagan James RN  Bleeding Management: dressing monitored     Problem: Bowel Motility Impaired (Knee Arthroplasty)  Goal: Effective Bowel Elimination  Outcome: Ongoing, Progressing     Problem: Fluid and Electrolyte Imbalance (Knee Arthroplasty)  Goal: Fluid and Electrolyte Balance  Outcome: Ongoing, Progressing     Problem: Functional Ability Impaired (Knee Arthroplasty)  Goal: Optimal Functional Ability  Outcome: Ongoing, Progressing  Intervention: Promote Optimal Functional Status  Description: Implement multidisciplinary rehabilitation following early mobility guidelines; coordinate pain control to optimize comfort with activity.  Identify functional limitations, such as ADL (activities of daily living), mobility safety and independence; encourage optimal participation to minimize decline associated with inactivity.  Facilitate functional mobility, such as bed mobility, transfers and ambulation; progress and retrain as tolerated.  Encourage ADL (activities of daily living), such as self-feeding, hygiene and dressing; provide set-up, adaptations, assistance and extra time as needed.  Promote a safe and accessible environment, as well as effective use of assistive devices and equipment.  Pace and cluster activity to balance with rest periods and  conserve energy; promote adequate nutrition, sleep and rest.  Facilitate range of motion and prescribed exercises, such as ankle pumps, quad sets, straight leg raises, and heel slides; consider NMES (neuromuscular electrical stimulation) to activate quadriceps.  Evaluate and address performance deficits, such as cognitive, balance and activity tolerance impairments.  Recent Flowsheet Documentation  Taken 8/20/2022 1145 by Teagan James, RN  Activity Management: activity adjusted per tolerance  Taken 8/20/2022 0907 by Teagan James RN  Activity Management:   activity adjusted per tolerance   sitting, edge of bed  Taken 8/20/2022 0810 by Teagan James RN  Activity Management: activity adjusted per tolerance     Problem: Infection (Knee Arthroplasty)  Goal: Absence of Infection Signs and Symptoms  Outcome: Ongoing, Progressing  Intervention: Prevent or Manage Infection  Description: Optimize activity and mobility to maximize infection resistance (e.g., reposition, sit in chair, ambulate).  Maintain normothermia and glycemic control to maximize infection resistance.  Maintain dressing and closed drainage system integrity to reduce the risk for infection; inspect incision as visible.  Protect incision from injury (e.g., care with movement, splinting techniques).  Discontinue prophylactic antimicrobial agent within 24 hours after procedure, as directed.  Implement transmission-based precautions and isolation, as indicated, to prevent spread of infection.  Identify early signs of sepsis, such as increased heart rate and decreased blood pressure, as well as changes in mental state, respiratory pattern or peripheral perfusion.  Prepare for rapid sepsis management, including lactate level, intravenous access, fluid administration and oxygen therapy.  Provide fever-reduction and comfort measures.  Recent Flowsheet Documentation  Taken 8/20/2022 1800 by Teagan James RN  Infection Prevention:   environmental surveillance  performed   equipment surfaces disinfected   hand hygiene promoted   personal protective equipment utilized   rest/sleep promoted   single patient room provided   visitors restricted/screened  Taken 8/20/2022 1716 by Teagan James RN  Infection Management: aseptic technique maintained  Infection Prevention:   environmental surveillance performed   equipment surfaces disinfected   hand hygiene promoted   personal protective equipment utilized   rest/sleep promoted   single patient room provided   visitors restricted/screened  Taken 8/20/2022 1600 by Teagan James RN  Infection Prevention:   environmental surveillance performed   equipment surfaces disinfected   hand hygiene promoted   personal protective equipment utilized   rest/sleep promoted   single patient room provided   visitors restricted/screened  Taken 8/20/2022 1400 by Teagan James RN  Infection Prevention:   environmental surveillance performed   equipment surfaces disinfected   hand hygiene promoted   personal protective equipment utilized   rest/sleep promoted   single patient room provided   visitors restricted/screened  Taken 8/20/2022 1309 by Teagan James RN  Infection Prevention:   environmental surveillance performed   equipment surfaces disinfected   hand hygiene promoted   personal protective equipment utilized   rest/sleep promoted   single patient room provided   visitors restricted/screened  Taken 8/20/2022 1200 by Teagan James RN  Infection Prevention:   environmental surveillance performed   equipment surfaces disinfected   hand hygiene promoted   personal protective equipment utilized   rest/sleep promoted   single patient room provided   visitors restricted/screened  Taken 8/20/2022 1145 by Teagan James RN  Infection Prevention:   environmental surveillance performed   equipment surfaces disinfected   hand hygiene promoted   personal protective equipment utilized   rest/sleep promoted   single patient room provided    visitors restricted/screened  Taken 8/20/2022 1000 by Teagan James RN  Infection Prevention:   environmental surveillance performed   equipment surfaces disinfected   hand hygiene promoted   personal protective equipment utilized   rest/sleep promoted   single patient room provided   visitors restricted/screened  Taken 8/20/2022 0907 by Teagan James RN  Infection Prevention:   environmental surveillance performed   equipment surfaces disinfected   hand hygiene promoted   personal protective equipment utilized   rest/sleep promoted   single patient room provided   visitors restricted/screened  Taken 8/20/2022 0810 by Teagan James RN  Infection Prevention:   environmental surveillance performed   equipment surfaces disinfected   hand hygiene promoted   personal protective equipment utilized   rest/sleep promoted   single patient room provided   visitors restricted/screened     Problem: Neurovascular Compromise (Knee Arthroplasty)  Goal: Intact Neurovascular Status  Outcome: Ongoing, Progressing     Problem: Ongoing Anesthesia Effects (Knee Arthroplasty)  Goal: Anesthesia/Sedation Recovery  Outcome: Ongoing, Progressing  Intervention: Optimize Anesthesia Recovery  Description: Assess and monitor airway, breathing and circulation; maintain close surveillance for deterioration.  Implement continuous monitoring, such as cardiorespiratory, blood pressure, temperature, pulse oximetry and capnography.  Elevate head of bed, if able; facilitate regular position changes.  Assess neurocognitive function and for risks that may lead to postoperative delirium, such as decreased level of consciousness, pain and agitation; offer reassurance; answer questions.  Assess and monitor neurovascular and neuromuscular function, such as motor strength, tone, posture, peripheral pulses and extremity sensation; protect areas of decreased sensation from heat, cold, medical devices or objects.  Individualize frequency and intensity of  monitoring based on sedation or anesthesia administered, identified risk factors, ongoing assessment and organizational protocol.  Prepare for administration of pharmacologic therapy, such as reversal agent, antiemetic or antipruritic medication, to manage sedation or anesthesia effects.  Adjust environment to maintain safety (e.g., fall precautions, safety equipment).  Recent Flowsheet Documentation  Taken 8/20/2022 1800 by Teagan James, RN  Safety Promotion/Fall Prevention:   assistive device/personal items within reach   clutter free environment maintained   fall prevention program maintained   nonskid shoes/slippers when out of bed   room organization consistent   safety round/check completed  Taken 8/20/2022 1716 by Teagan James, RN  Safety Promotion/Fall Prevention:   assistive device/personal items within reach   clutter free environment maintained   fall prevention program maintained   nonskid shoes/slippers when out of bed   room organization consistent   safety round/check completed  Taken 8/20/2022 1600 by Teagan James, RN  Safety Promotion/Fall Prevention:   assistive device/personal items within reach   clutter free environment maintained   fall prevention program maintained   nonskid shoes/slippers when out of bed   safety round/check completed   room organization consistent  Taken 8/20/2022 1309 by Teagan James, RN  Safety Promotion/Fall Prevention:   assistive device/personal items within reach   clutter free environment maintained   fall prevention program maintained   nonskid shoes/slippers when out of bed   room organization consistent   safety round/check completed  Taken 8/20/2022 1200 by Teagan James, RN  Safety Promotion/Fall Prevention:   assistive device/personal items within reach   clutter free environment maintained   fall prevention program maintained   nonskid shoes/slippers when out of bed   safety round/check completed   room organization consistent  Taken 8/20/2022 1145 by  Teagan James, RN  Safety Promotion/Fall Prevention:   assistive device/personal items within reach   clutter free environment maintained   fall prevention program maintained   nonskid shoes/slippers when out of bed   room organization consistent   safety round/check completed   lighting adjusted  Taken 8/20/2022 1000 by Teagan James, RN  Safety Promotion/Fall Prevention:   assistive device/personal items within reach   clutter free environment maintained   fall prevention program maintained   nonskid shoes/slippers when out of bed   room organization consistent   safety round/check completed   lighting adjusted  Taken 8/20/2022 0907 by Teagan James, RN  Safety Promotion/Fall Prevention:   assistive device/personal items within reach   clutter free environment maintained   fall prevention program maintained   nonskid shoes/slippers when out of bed   room organization consistent   safety round/check completed   lighting adjusted  Taken 8/20/2022 0810 by Teagan James, RN  Patient Tolerance (IS): good  Safety Promotion/Fall Prevention:   assistive device/personal items within reach   clutter free environment maintained   activity supervised   fall prevention program maintained   nonskid shoes/slippers when out of bed   room organization consistent   safety round/check completed  Level Incentive Spirometer (mL): 1750  Number of Repetitions (IS): 5     Problem: Pain (Knee Arthroplasty)  Goal: Acceptable Pain Control  Outcome: Ongoing, Progressing  Intervention: Prevent or Manage Pain  Description: Set pain management goals; mutually determine pain management plan; review regularly.  Evaluate risk for opioid use; individualize pharmacologic pain management plan and titrate medication to patient response.  Combine multimodal analgesia and nonpharmacologic strategies to help potentiate synergistic effects, enhance comfort and improve function (e.g., complementary therapy, diversional activity, mindfulness).  Provide  around-the-clock dosing of pain medication to keep pain levels in control.  Manage medication-induced effects, such as respiratory depression, constipation, nausea, vomiting.  Minimize pain stimuli; coordinate care and adjust environment (e.g., light, noise, unnecessary movement); promote sleep/rest for optimal healing.  Recent Flowsheet Documentation  Taken 8/20/2022 1716 by Teagan James, RN  Pain Management Interventions:   around-the-clock dosing utilized   care clustered   cold applied   diversional activity provided   pillow support provided   position adjusted   quiet environment facilitated   see MAR  Taken 8/20/2022 1309 by Teagan James, RN  Pain Management Interventions:   around-the-clock dosing utilized   care clustered   cold applied   diversional activity provided   pillow support provided   position adjusted   quiet environment facilitated   see MAR  Taken 8/20/2022 0907 by Teagan James, RN  Pain Management Interventions:   care clustered   diversional activity provided   cold applied   pillow support provided   position adjusted   see MAR   quiet environment facilitated     Problem: Postoperative Nausea and Vomiting (Knee Arthroplasty)  Goal: Nausea and Vomiting Relief  Outcome: Ongoing, Progressing     Problem: Postoperative Urinary Retention (Knee Arthroplasty)  Goal: Effective Urinary Elimination  Outcome: Ongoing, Progressing     Problem: Respiratory Compromise (Knee Arthroplasty)  Goal: Effective Oxygenation and Ventilation  Outcome: Ongoing, Progressing  Intervention: Optimize Oxygenation and Ventilation  Description: Recognize risk for obstructive sleep apnea; anticipate the need for continuous pulse oximetry and noninvasive positive pressure ventilation.  Maintain patent airway with positioning, airway adjuncts, secretion clearance.  Encourage pulmonary hygiene, such as cough-enhancement and airway-clearance techniques, that may include use of incentive spirometry, deep breathing and  cough.  Provide oxygen therapy judiciously, if hypoxemia present.  Consider pharmacologic therapy that may improve mucus clearance and reduce bronchospasm, laryngospasm, swelling or stridor.  Consider the need for intubation and invasive positive pressure ventilation for longer recovery needs; use lung protective measures.  Recent Flowsheet Documentation  Taken 8/20/2022 1145 by Teagan James, RN  Head of Bed (HOB) Positioning: HOB at 20-30 degrees  Taken 8/20/2022 0810 by Teagan James, RN  Patient Tolerance (IS): good  Head of Bed (HOB) Positioning: HOB at 20-30 degrees  Level Incentive Spirometer (mL): 1750  Number of Repetitions (IS): 5   Goal Outcome Evaluation:  Plan of Care Reviewed With: patient, spouse           Outcome Evaluation: Vitals as documented. Taking PO well. Up to BR with walker, gait belt and one assist. Denies CP and SOA. No s/sx of distress. PRN pain medication effective for pain control. Offered / assessed when available. Ice packs replaced / in place. Moderete swelling and bruising noted to right knee. MATTHEW dressing CDI. Monitoring.

## 2022-08-20 NOTE — PLAN OF CARE
Goal Outcome Evaluation:  Plan of Care Reviewed With: patient        Progress: improving  Outcome Evaluation: Pt remains stable. Ambulates with walker use and SBA. Dressing is cdi. HV in place with minimal drng. Pain well controlled with Norco. ABX changed per ID following wound cx results. Voiding per BRP. Educated on htn management. Plans to dc to SNF when arranged. WCTM.

## 2022-08-20 NOTE — PROGRESS NOTES
[facility] Clinical Pharmacy Services: Warfarin Dosing/Monitoring Consult    Fe Arevalo is a 72 y.o. female, estimated creatinine clearance is 45.4 mL/min (A) (by C-G formula based on SCr of 1.48 mg/dL (H)). weighing 104 kg (230 lb 2.6 oz).    Results from last 7 days   Lab Units 08/20/22  0416 08/20/22  0415 08/19/22  0442 08/18/22  1212 08/18/22  0341 08/17/22  2028 08/16/22  1630   INR  1.27*  --  1.45* 1.83* 2.36* 2.58* 2.70*   HEMOGLOBIN g/dL  --  7.8*  --   --   --   --  9.7*   HEMATOCRIT %  --  24.0*  --   --   --   --  29.4*   PLATELETS 10*3/mm3  --  266  --   --   --   --  314     Prior to admission anticoagulation: Home regimen of 5mg on Wed & Sun and 7.5mg all other days of the week    Hospital Anticoagulation:  Consulting provider: Dr. Hammonds  Start date: 8/18/22  Indication:  full anticoagulation  Target INR: 2 - 3  Expected duration: indefinite  Bridge Therapy: none    Potential food or drug interactions:   Education complete?/Date: No; plan for follow up TBD    Assessment/Plan:  Boost dose 10 mg once today to help counter the reversal effects of vitamin K 5 mg given on both 8/17 and 8/18.  Monitor for any signs or symptoms of bleeding  Follow up daily INRs and dose adjustments    Pharmacy will continue to follow until discharge or discontinuation of warfarin.     Nas Harris III, PharmD  Clinical Pharmacist

## 2022-08-20 NOTE — PLAN OF CARE
Goal Outcome Evaluation:  Plan of Care Reviewed With: patient        Progress: improving  Outcome Evaluation: Pt motivated to participate with PT this date reporting pain control has improved. She mabultaes 60ft with CGA and performed bed mobility with SBA. She requires cues for placement during STS transfer and remains appropriate for skilled PT.

## 2022-08-21 LAB
INR PPP: 1.37 (ref 0.9–1.1)
PROTHROMBIN TIME: 16.7 SECONDS (ref 11.7–14.2)

## 2022-08-21 PROCEDURE — 99024 POSTOP FOLLOW-UP VISIT: CPT | Performed by: ORTHOPAEDIC SURGERY

## 2022-08-21 PROCEDURE — C1751 CATH, INF, PER/CENT/MIDLINE: HCPCS

## 2022-08-21 PROCEDURE — 85610 PROTHROMBIN TIME: CPT | Performed by: ORTHOPAEDIC SURGERY

## 2022-08-21 PROCEDURE — 97530 THERAPEUTIC ACTIVITIES: CPT

## 2022-08-21 PROCEDURE — 25010000002 PIPERACILLIN SOD-TAZOBACTAM PER 1 G: Performed by: INTERNAL MEDICINE

## 2022-08-21 RX ORDER — WARFARIN SODIUM 5 MG/1
5 TABLET ORAL
Status: DISCONTINUED | OUTPATIENT
Start: 2022-08-24 | End: 2022-08-22 | Stop reason: HOSPADM

## 2022-08-21 RX ORDER — WARFARIN SODIUM 7.5 MG/1
7.5 TABLET ORAL
Status: COMPLETED | OUTPATIENT
Start: 2022-08-21 | End: 2022-08-21

## 2022-08-21 RX ORDER — SODIUM CHLORIDE 0.9 % (FLUSH) 0.9 %
20 SYRINGE (ML) INJECTION AS NEEDED
Status: DISCONTINUED | OUTPATIENT
Start: 2022-08-21 | End: 2022-08-22 | Stop reason: HOSPADM

## 2022-08-21 RX ORDER — SODIUM CHLORIDE 0.9 % (FLUSH) 0.9 %
10 SYRINGE (ML) INJECTION EVERY 12 HOURS SCHEDULED
Status: DISCONTINUED | OUTPATIENT
Start: 2022-08-21 | End: 2022-08-22 | Stop reason: HOSPADM

## 2022-08-21 RX ORDER — SODIUM CHLORIDE 0.9 % (FLUSH) 0.9 %
10 SYRINGE (ML) INJECTION AS NEEDED
Status: DISCONTINUED | OUTPATIENT
Start: 2022-08-21 | End: 2022-08-22 | Stop reason: HOSPADM

## 2022-08-21 RX ADMIN — WARFARIN 7.5 MG: 7.5 TABLET ORAL at 18:00

## 2022-08-21 RX ADMIN — TAZOBACTAM SODIUM AND PIPERACILLIN SODIUM 3.38 G: 375; 3 INJECTION, SOLUTION INTRAVENOUS at 20:54

## 2022-08-21 RX ADMIN — HYDROCODONE BITARTRATE AND ACETAMINOPHEN 2 TABLET: 10; 325 TABLET ORAL at 06:12

## 2022-08-21 RX ADMIN — METOPROLOL TARTRATE 50 MG: 50 TABLET, FILM COATED ORAL at 09:14

## 2022-08-21 RX ADMIN — TAZOBACTAM SODIUM AND PIPERACILLIN SODIUM 3.38 G: 375; 3 INJECTION, SOLUTION INTRAVENOUS at 11:32

## 2022-08-21 RX ADMIN — METOPROLOL TARTRATE 50 MG: 50 TABLET, FILM COATED ORAL at 20:53

## 2022-08-21 RX ADMIN — HYDROCODONE BITARTRATE AND ACETAMINOPHEN 2 TABLET: 10; 325 TABLET ORAL at 16:36

## 2022-08-21 RX ADMIN — MECLIZINE HYDROCHLORIDE 25 MG: 25 TABLET ORAL at 09:14

## 2022-08-21 RX ADMIN — HYDROCODONE BITARTRATE AND ACETAMINOPHEN 2 TABLET: 10; 325 TABLET ORAL at 11:32

## 2022-08-21 RX ADMIN — HYDRALAZINE HYDROCHLORIDE 25 MG: 25 TABLET, FILM COATED ORAL at 09:14

## 2022-08-21 RX ADMIN — TAZOBACTAM SODIUM AND PIPERACILLIN SODIUM 3.38 G: 375; 3 INJECTION, SOLUTION INTRAVENOUS at 04:16

## 2022-08-21 RX ADMIN — HYDROCODONE BITARTRATE AND ACETAMINOPHEN 2 TABLET: 10; 325 TABLET ORAL at 01:40

## 2022-08-21 RX ADMIN — HYDRALAZINE HYDROCHLORIDE 25 MG: 25 TABLET, FILM COATED ORAL at 20:53

## 2022-08-21 RX ADMIN — HYDROCODONE BITARTRATE AND ACETAMINOPHEN 2 TABLET: 10; 325 TABLET ORAL at 20:53

## 2022-08-21 NOTE — PROGRESS NOTES
[facility] Clinical Pharmacy Services: Warfarin Dosing/Monitoring Consult    Fe Arevalo is a 72 y.o. female, estimated creatinine clearance is 45.4 mL/min (A) (by C-G formula based on SCr of 1.48 mg/dL (H)). weighing 104 kg (230 lb 2.6 oz).    Results from last 7 days   Lab Units 08/21/22  0500 08/20/22  0416 08/20/22  0415 08/19/22  0442 08/18/22  1212 08/18/22  0341 08/17/22  2028 08/16/22  1630   INR  1.37* 1.27*  --  1.45* 1.83* 2.36*   < > 2.70*   HEMOGLOBIN g/dL  --   --  7.8*  --   --   --   --  9.7*   HEMATOCRIT %  --   --  24.0*  --   --   --   --  29.4*   PLATELETS 10*3/mm3  --   --  266  --   --   --   --  314    < > = values in this interval not displayed.     Prior to admission anticoagulation: Home regimen of 5mg on Wed & Sun and 7.5mg all other days of the week    Hospital Anticoagulation:  Consulting provider: Dr. Hammonds  Start date: 8/18/22  Indication:  full anticoagulation  Target INR: 2 - 3  Expected duration: indefinite  Bridge Therapy: none    Potential food or drug interactions:   Education complete?/Date: No; plan for follow up TBD    Assessment/Plan:  INR is starting to increase towards goal - will boost dose 7.5 mg today and tentatively resume home regimen tomorrow.  Monitor for any signs or symptoms of bleeding  Follow up daily INRs and dose adjustments    Pharmacy will continue to follow until discharge or discontinuation of warfarin.     Nas Harris, III, PharmD  Clinical Pharmacist

## 2022-08-21 NOTE — PROGRESS NOTES
Orthopedic Progress Note      Patient: Fe Arevalo  Date of Admission: 8/16/2022  YOB: 1950  Medical Record Number: [id number]    POD # :  3 Days Post-Op Procedure(s) (LRB):  KNEE POLY INSERT EXCHANGE (Right)    Systemic or Specific Complaints: No Complaints    Pain Relief: some relief    Physical Exam:  72 y.o.  female  Vitals:  Temp:  [96 °F (35.6 °C)-99 °F (37.2 °C)] 98 °F (36.7 °C)  Heart Rate:  [65-80] 65  Resp:  [16] 16  BP: (105-121)/(53-64) 121/64  alert and oriented  Chest: Clear to auscultation  CV: Regular Rate and Rhythm  Abd: Soft, NT, with BS +  Ext: NV intact. ROM appropriate. Calf is soft and nontender. Negative Homans sign  Skin: Primary bandage has nickel size amount of blood, dry and intact. No signs or  symptoms of infection noted.    Activity: Mobilizing Per P.T.   Weight Bearing: As Tolerated    Data Review     Admission on 08/16/2022   Component Date Value Ref Range Status    Protime 08/16/2022 28.0 (A) 11.7 - 14.2 Seconds Final    INR 08/16/2022 2.70 (A) 0.90 - 1.10 Final    Glucose 08/16/2022 108 (A) 65 - 99 mg/dL Final    BUN 08/16/2022 41 (A) 8 - 23 mg/dL Final    Creatinine 08/16/2022 1.69 (A) 0.57 - 1.00 mg/dL Final    Sodium 08/16/2022 130 (A) 136 - 145 mmol/L Final    Potassium 08/16/2022 4.0  3.5 - 5.2 mmol/L Final    Chloride 08/16/2022 96 (A) 98 - 107 mmol/L Final    CO2 08/16/2022 23.9  22.0 - 29.0 mmol/L Final    Calcium 08/16/2022 10.8 (A) 8.6 - 10.5 mg/dL Final    BUN/Creatinine Ratio 08/16/2022 24.3  7.0 - 25.0 Final    Anion Gap 08/16/2022 10.1  5.0 - 15.0 mmol/L Final    eGFR 08/16/2022 32.4 (A) >60.0 mL/min/1.73 Final    National Kidney Foundation and American Society of Nephrology (ASN) Task Force recommended calculation based on the Chronic Kidney Disease Epidemiology Collaboration (CKD-EPI) equation refit without adjustment for race.    C-Reactive Protein 08/16/2022 4.72 (A) 0.00 - 0.50 mg/dL Final    Sed Rate 08/16/2022 48 (A) 0 - 30 mm/hr Final     Wound Culture 08/16/2022 Light growth (2+) Pseudomonas aeruginosa (A)  Final    Wound Culture 08/16/2022 Light growth (2+) Providencia rettgeri (A)  Final    Wound Culture 08/16/2022 Scant growth (1+) Enterococcus faecalis (A)  Final    Gram Stain 08/16/2022 Rare (1+) WBCs per low power field   Final    Gram Stain 08/16/2022 Rare (1+) Gram positive cocci   Final    Gram Stain 08/16/2022 Occasional Gram variable bacilli   Final    WBC 08/16/2022 8.05  3.40 - 10.80 10*3/mm3 Final    RBC 08/16/2022 3.19 (A) 3.77 - 5.28 10*6/mm3 Final    Hemoglobin 08/16/2022 9.7 (A) 12.0 - 15.9 g/dL Final    Hematocrit 08/16/2022 29.4 (A) 34.0 - 46.6 % Final    MCV 08/16/2022 92.2  79.0 - 97.0 fL Final    MCH 08/16/2022 30.4  26.6 - 33.0 pg Final    MCHC 08/16/2022 33.0  31.5 - 35.7 g/dL Final    RDW 08/16/2022 12.3  12.3 - 15.4 % Final    RDW-SD 08/16/2022 40.6  37.0 - 54.0 fl Final    MPV 08/16/2022 10.3  6.0 - 12.0 fL Final    Platelets 08/16/2022 314  140 - 450 10*3/mm3 Final    Neutrophil % 08/16/2022 77.1 (A) 42.7 - 76.0 % Final    Lymphocyte % 08/16/2022 9.4 (A) 19.6 - 45.3 % Final    Monocyte % 08/16/2022 10.1  5.0 - 12.0 % Final    Eosinophil % 08/16/2022 2.4  0.3 - 6.2 % Final    Basophil % 08/16/2022 0.6  0.0 - 1.5 % Final    Immature Grans % 08/16/2022 0.4  0.0 - 0.5 % Final    Neutrophils, Absolute 08/16/2022 6.21  1.70 - 7.00 10*3/mm3 Final    Lymphocytes, Absolute 08/16/2022 0.76  0.70 - 3.10 10*3/mm3 Final    Monocytes, Absolute 08/16/2022 0.81  0.10 - 0.90 10*3/mm3 Final    Eosinophils, Absolute 08/16/2022 0.19  0.00 - 0.40 10*3/mm3 Final    Basophils, Absolute 08/16/2022 0.05  0.00 - 0.20 10*3/mm3 Final    Immature Grans, Absolute 08/16/2022 0.03  0.00 - 0.05 10*3/mm3 Final    nRBC 08/16/2022 0.0  0.0 - 0.2 /100 WBC Final    COVID19 08/16/2022 Not Detected  Not Detected - Ref. Range Final    Protime 08/17/2022 27.0 (A) 11.7 - 14.2 Seconds Final    INR 08/17/2022 2.58 (A) 0.90 - 1.10 Final    Protime 08/18/2022 25.2  (A) 11.7 - 14.2 Seconds Final    INR 08/18/2022 2.36 (A) 0.90 - 1.10 Final    Wound Culture 08/18/2022 Scant growth (1+) Providencia rettgeri (A)  Final    Wound Culture 08/18/2022 Rare Pseudomonas aeruginosa (A)  Final    Gram Stain 08/18/2022 Few (2+) WBCs seen   Final    Gram Stain 08/18/2022 No organisms seen   Final    Wound Culture 08/18/2022 Scant growth (1+) Providencia rettgeri (A)  Final    Wound Culture 08/18/2022 Rare Pseudomonas aeruginosa (A)  Final    Gram Stain 08/18/2022 Rare (1+) WBCs seen   Final    Gram Stain 08/18/2022 No organisms seen   Final    Wound Culture 08/18/2022 Scant growth (1+) Providencia rettgeri (A)  Final    Wound Culture 08/18/2022 Rare Pseudomonas aeruginosa (A)  Final    Gram Stain 08/18/2022 Few (2+) WBCs seen   Final    Gram Stain 08/18/2022 No organisms seen   Final    Protime 08/18/2022 20.8 (A) 11.7 - 14.2 Seconds Final    INR 08/18/2022 1.83 (A) 0.90 - 1.10 Final    Protime 08/19/2022 17.4 (A) 11.7 - 14.2 Seconds Final    INR 08/19/2022 1.45 (A) 0.90 - 1.10 Final    Vancomycin Trough 08/19/2022 15.00  5.00 - 20.00 mcg/mL Final    Creatinine 08/19/2022 1.57 (A) 0.57 - 1.00 mg/dL Final    eGFR 08/19/2022 35.3 (A) >60.0 mL/min/1.73 Final    National Kidney Foundation and American Society of Nephrology (ASN) Task Force recommended calculation based on the Chronic Kidney Disease Epidemiology Collaboration (CKD-EPI) equation refit without adjustment for race.    Protime 08/20/2022 15.7 (A) 11.7 - 14.2 Seconds Final    INR 08/20/2022 1.27 (A) 0.90 - 1.10 Final    Glucose 08/20/2022 103 (A) 65 - 99 mg/dL Final    BUN 08/20/2022 27 (A) 8 - 23 mg/dL Final    Creatinine 08/20/2022 1.48 (A) 0.57 - 1.00 mg/dL Final    Sodium 08/20/2022 130 (A) 136 - 145 mmol/L Final    Potassium 08/20/2022 4.7  3.5 - 5.2 mmol/L Final    Slight hemolysis detected by analyzer. Results may be affected.    Chloride 08/20/2022 95 (A) 98 - 107 mmol/L Final    CO2 08/20/2022 25.6  22.0 - 29.0 mmol/L Final     Calcium 08/20/2022 10.0  8.6 - 10.5 mg/dL Final    Total Protein 08/20/2022 6.0  6.0 - 8.5 g/dL Final    Albumin 08/20/2022 2.80 (A) 3.50 - 5.20 g/dL Final    ALT (SGPT) 08/20/2022 21  1 - 33 U/L Final    AST (SGOT) 08/20/2022 18  1 - 32 U/L Final    Slight hemolysis detected by analyzer. Results may be affected.    Alkaline Phosphatase 08/20/2022 68  39 - 117 U/L Final    Total Bilirubin 08/20/2022 0.5  0.0 - 1.2 mg/dL Final    Globulin 08/20/2022 3.2  gm/dL Final    A/G Ratio 08/20/2022 0.9  g/dL Final    BUN/Creatinine Ratio 08/20/2022 18.2  7.0 - 25.0 Final    Anion Gap 08/20/2022 9.4  5.0 - 15.0 mmol/L Final    eGFR 08/20/2022 37.9 (A) >60.0 mL/min/1.73 Final    National Kidney Foundation and American Society of Nephrology (ASN) Task Force recommended calculation based on the Chronic Kidney Disease Epidemiology Collaboration (CKD-EPI) equation refit without adjustment for race.    Sed Rate 08/20/2022 57 (A) 0 - 30 mm/hr Final    C-Reactive Protein 08/20/2022 15.34 (A) 0.00 - 0.50 mg/dL Final    WBC 08/20/2022 8.44  3.40 - 10.80 10*3/mm3 Final    RBC 08/20/2022 2.64 (A) 3.77 - 5.28 10*6/mm3 Final    Hemoglobin 08/20/2022 7.8 (A) 12.0 - 15.9 g/dL Final    Hematocrit 08/20/2022 24.0 (A) 34.0 - 46.6 % Final    MCV 08/20/2022 90.9  79.0 - 97.0 fL Final    MCH 08/20/2022 29.5  26.6 - 33.0 pg Final    MCHC 08/20/2022 32.5  31.5 - 35.7 g/dL Final    RDW 08/20/2022 12.4  12.3 - 15.4 % Final    RDW-SD 08/20/2022 41.0  37.0 - 54.0 fl Final    MPV 08/20/2022 10.3  6.0 - 12.0 fL Final    Platelets 08/20/2022 266  140 - 450 10*3/mm3 Final    Neutrophil % 08/20/2022 75.5  42.7 - 76.0 % Final    Lymphocyte % 08/20/2022 10.4 (A) 19.6 - 45.3 % Final    Monocyte % 08/20/2022 11.8  5.0 - 12.0 % Final    Eosinophil % 08/20/2022 1.3  0.3 - 6.2 % Final    Basophil % 08/20/2022 0.2  0.0 - 1.5 % Final    Immature Grans % 08/20/2022 0.8 (A) 0.0 - 0.5 % Final    Neutrophils, Absolute 08/20/2022 6.36  1.70 - 7.00 10*3/mm3 Final     Lymphocytes, Absolute 08/20/2022 0.88  0.70 - 3.10 10*3/mm3 Final    Monocytes, Absolute 08/20/2022 1.00 (A) 0.10 - 0.90 10*3/mm3 Final    Eosinophils, Absolute 08/20/2022 0.11  0.00 - 0.40 10*3/mm3 Final    Basophils, Absolute 08/20/2022 0.02  0.00 - 0.20 10*3/mm3 Final    Immature Grans, Absolute 08/20/2022 0.07 (A) 0.00 - 0.05 10*3/mm3 Final    nRBC 08/20/2022 0.0  0.0 - 0.2 /100 WBC Final    Protime 08/21/2022 16.7 (A) 11.7 - 14.2 Seconds Final    INR 08/21/2022 1.37 (A) 0.90 - 1.10 Final       No results found.    Medications:  hydrALAZINE, 25 mg, Oral, BID  meclizine, 25 mg, Oral, Daily  metoprolol tartrate, 50 mg, Oral, BID  piperacillin-tazobactam, 3.375 g, Intravenous, Q8H  warfarin, 5 mg, Oral, Once per day on Sun Wed  [START ON 8/22/2022] warfarin, 7.5 mg, Oral, Once per day on Mon Tue Thu Fri Sat      HYDROcodone-acetaminophen **OR** HYDROcodone-acetaminophen    Pharmacy to dose warfarin    Assessment:  Doing well POD  # 3 Days Post-Op Procedure(s) (LRB):  KNEE POLY INSERT EXCHANGE (Right)  Problems Addressed this Visit          Musculoskeletal and Injuries    * (Principal) Status post knee replacement - Primary    Relevant Orders    Case request (Completed)    Wound Culture - Wound, Knee, Right (Completed)    Wound Culture - Wound, Knee, Right (Completed)    Wound Culture - Wound, Knee, Right (Completed)          Diagnoses         Codes Comments    Status post knee replacement, unspecified laterality    -  Primary ICD-10-CM: Z96.659  ICD-9-CM: V43.65             Plan:  Continue efforts to mobilize - WBAT   Continue Pain Control Measures - orals  Continue incisional Care - Likely need to change island dressing before rehab tomorrow  DVT prophylaxis    Discharge Plan:Rehab tomorrow if bed available    AFIA Wilson    Date: 8/21/2022  Time: 10:35 EDT      I have reviewed and agree with the above.    Carlos Rodney MD

## 2022-08-21 NOTE — PLAN OF CARE
Goal Outcome Evaluation:              Outcome Evaluation: Pt is s/p R knee I&D and polyexchange POD3. Requires CGA for STS and amb x75 ft with FWW. Discussed heel prop to address knee ext deficits. Plan is SNF at D/C.

## 2022-08-21 NOTE — PLAN OF CARE
Goal Outcome Evaluation:  Plan of Care Reviewed With: patient      Pt POD3 Right KNEE POLY INSERT EXCHANGE, VSS, Asst.x1, A&Ox4, PICC line placed today, plans to discharge tomorrow to SNF on IV antibiotics.

## 2022-08-21 NOTE — THERAPY TREATMENT NOTE
Patient Name: Fe Arevalo  : 1950    MRN: [id number]                              Today's Date: 2022       Admit Date: 2022    Visit Dx:     ICD-10-CM ICD-9-CM   1. Status post knee replacement, unspecified laterality  Z96.659 V43.65     Patient Active Problem List   Diagnosis   • Arthritis of ankle   • Wound dehiscence   • Pain and swelling of ankle   • Status post knee replacement   • Status post knee replacement, unspecified laterality     Past Medical History:   Diagnosis Date   • A-fib (HCC)    • Histoplasmosis     STATES BILAT EYES:  INJECTIONS INTO HER EYES BILAT EVERY 7 WEEKS.  DENIES VISION LOSS   • History of blood clots 2021    PT STATES WAS DIAGNOSED WITH BLOOD CLOT BEHIND HER HEART AUG 2021-PT STATES THIS IS RESOLVED AND HAD CARDIAC ABLATION 2022   • Hypertension    • Lab test positive for detection of COVID-19 virus 2022   • Osteoarthritis of right knee    • Osteomyelitis (HCC) 2006    MRSA RIGHT FOOT   • PONV (postoperative nausea and vomiting)    • Right knee pain     WEAKNESS AND LIMITED MOBILITY     Past Surgical History:   Procedure Laterality Date   • CARDIAC ABLATION  04/29/2022    X2.  PT HAS ATRICLIP DANISHA EXCLUSION SYSTEM IN PLACE THAT PT STATES IS NOT WORKING   • CHOLECYSTECTOMY     • COLONOSCOPY     • FOOT SURGERY Right     x7 DT OSTEOMYELITIS   • HYSTERECTOMY     • KNEE MINI REVISION Right 2022    Procedure: KNEE POLY INSERT EXCHANGE;  Surgeon: Nacho Hammonds II, MD;  Location: [facility];  Service: Orthopedics;  Laterality: Right;   • TOTAL KNEE ARTHROPLASTY Right 7/15/2022    Procedure: TOTAL KNEE ARTHROPLASTY WITH ROBOT;  Surgeon: Nacho Hammonds II, MD;  Location: [facility];  Service: Robotics - Ortho;  Laterality: Right;   •  SHUNT INSERTION        General Information     Row Name 22 1207          Physical Therapy Time and Intention    Document Type therapy note (daily note)  -CC     Mode of Treatment  individual therapy;physical therapy  -     Row Name 08/21/22 1207          General Information    Existing Precautions/Restrictions no known precautions/restrictions  -     Row Name 08/21/22 1207          Cognition    Orientation Status (Cognition) oriented x 4  -     Row Name 08/21/22 1207          Safety Issues, Functional Mobility    Impairments Affecting Function (Mobility) balance;pain;strength;range of motion (ROM)  -           User Key  (r) = Recorded By, (t) = Taken By, (c) = Cosigned By    Initials Name Provider Type    Judy Shell PT Physical Therapist               Mobility     Row Name 08/21/22 1207          Bed Mobility    Comment, (Bed Mobility) UIC  -     Row Name 08/21/22 1207          Sit-Stand Transfer    Sit-Stand [place] (Transfers) verbal cues;contact guard  -CC     Assistive Device (Sit-Stand Transfers) walker, front-wheeled  -CC     Row Name 08/21/22 1207          Gait/Stairs (Locomotion)    [place] Level (Gait) verbal cues;contact guard  -CC     Assistive Device (Gait) walker, front-wheeled  -     Distance in Feet (Gait) 75 ft  -     Deviations/Abnormal Patterns (Gait) right sided deviations;antalgic;gait speed decreased;stride length decreased;weight shifting decreased  -     Right Sided Gait Deviations decreased knee extension;weight shift ability decreased  -           User Key  (r) = Recorded By, (t) = Taken By, (c) = Cosigned By    Initials Name Provider Type    Judy Shell PT Physical Therapist               Obj/Interventions     Row Name 08/21/22 1208          Motor Skills    Therapeutic Exercise --  x10 reps TKA protocol  -           User Key  (r) = Recorded By, (t) = Taken By, (c) = Cosigned By    Initials Name Provider Type    Judy Shell PT Physical Therapist               Goals/Plan    No documentation.                Clinical Impression     Row Name 08/21/22 1208          Plan of Care Review    Outcome  Evaluation Pt is s/p R knee I&D and polyexchange POD3. Requires CGA for STS and amb x75 ft with FWW. Discussed heel prop to address knee ext deficits. Plan is SNF at D/C.  -CC     Row Name 08/21/22 1208          Positioning and Restraints    Pre-Treatment Position sitting in chair/recliner  -CC     Post Treatment Position chair  -CC     In Chair reclined;call light within reach;encouraged to call for assist;exit alarm on;with family/caregiver  -CC           User Key  (r) = Recorded By, (t) = Taken By, (c) = Cosigned By    Initials Name Provider Type    Judy Shell, PT Physical Therapist               Outcome Measures     Row Name 08/21/22 1209 08/21/22 0914       How much help from another person do you currently need...    Turning from your back to your side while in flat bed without using bedrails? 4  -CC 4  -SH    Moving from lying on back to sitting on the side of a flat bed without bedrails? 3  -CC 3  -SH    Moving to and from a bed to a chair (including a wheelchair)? 3  -CC 3  -SH    Standing up from a chair using your arms (e.g., wheelchair, bedside chair)? 3  -CC 3  -SH    Climbing 3-5 steps with a railing? 2  -CC 2  -SH    To walk in hospital room? 3  -CC 3  -SH    AM-PAC 6 Clicks Score (PT) 18  -CC 18  -SH    Highest level of mobility 6 --> Walked 10 steps or more  -CC 6 --> Walked 10 steps or more  -SH    Row Name 08/21/22 1209          Functional Assessment    Outcome Measure Options AM-PAC 6 Clicks Basic Mobility (PT)  -           User Key  (r) = Recorded By, (t) = Taken By, (c) = Cosigned By    Initials Name Provider Type    Judy Shell, SUNIL Physical Therapist    Geno Latahm, RN Registered Nurse                             Physical Therapy Education                 Title: PT OT SLP Therapies (Done)     Topic: Physical Therapy (Done)     Point: Mobility training (Done)     Learning Progress Summary           Patient Acceptance, E, VU by RS at 8/20/2022 4906     Acceptance, E, VU,DU by  at 8/19/2022 1601   Family Acceptance, E, VU,DU by  at 8/19/2022 1601                   Point: Home exercise program (Done)     Learning Progress Summary           Patient Acceptance, E, VU by RS at 8/20/2022 1717    Acceptance, E, VU,DU by  at 8/19/2022 1601   Family Acceptance, E, VU,DU by  at 8/19/2022 1601                   Point: Body mechanics (Done)     Learning Progress Summary           Patient Acceptance, E, VU by RS at 8/20/2022 1717    Acceptance, E, VU,DU by  at 8/19/2022 1601   Family Acceptance, E, VU,DU by  at 8/19/2022 1601                   Point: Precautions (Done)     Learning Progress Summary           Patient Acceptance, E, VU by  at 8/20/2022 1717    Acceptance, E, VU,DU by  at 8/19/2022 1601   Family Acceptance, E, VU,DU by  at 8/19/2022 1601                               User Key     Initials Effective Dates Name Provider Type Discipline     06/16/21 -  Stephania Will, PT Physical Therapist PT     08/15/22 -  Alan Bang, PT Student PT Student PT              PT Recommendation and Plan     Outcome Evaluation: Pt is s/p R knee I&D and polyexchange POD3. Requires CGA for STS and amb x75 ft with FWW. Discussed heel prop to address knee ext deficits. Plan is SNF at D/C.     Time Calculation:    PT Charges     Row Name 08/21/22 1210             Time Calculation    Start Time 1020  -CC      Stop Time 1032  -CC      Time Calculation (min) 12 min  -CC      PT Received On 08/21/22  -CC      PT - Next Appointment 08/22/22  -CC              Time Calculation- PT    Total Timed Code Minutes- PT 12 minute(s)  -CC              Timed Charges    85448 - PT Therapeutic Exercise Minutes 5  -CC      64334 - PT Therapeutic Activity Minutes 7  -CC              Total Minutes    Timed Charges Total Minutes 12  -CC       Total Minutes 12  -CC            User Key  (r) = Recorded By, (t) = Taken By, (c) = Cosigned By    Initials Name Provider Type    CC Anthony  Judy, PT Physical Therapist              Therapy Charges for Today     Code Description Service Date Service Provider Modifiers Qty    [id number]  PT THERAPEUTIC ACT EA 15 MIN 8/21/2022 Judy Cruz, PT GP 1          PT G-Codes  Outcome Measure Options: AM-PAC 6 Clicks Basic Mobility (PT)  AM-PAC 6 Clicks Score (PT): 18    Judy Cruz PT  8/21/2022

## 2022-08-21 NOTE — SIGNIFICANT NOTE
08/21/22 1629   PICC Single Lumen 08/21/22 Right Basilic   Placement Date/Time: 08/21/22 1628   Hand Hygiene Completed: Yes  Size (Fr): 4  Description (optional): [id number]   exp 09-  Length (cm): 44 cm  Orientation: Right  Location: Basilic  Site Prep: Chlorhexidine isopropyl alcohol  All 5 Sterile Bar...   Site Assessment Clean;Dry;Intact   #1 Lumen Status Blood return noted;Capped;Flushed;Normal saline locked   Length juan (cm) 44 cm   Extremity Circumference (cm) 31 cm   Dressing Type Border Dressing;Transparent;Securing device   Dressing Change Due 08/28/22   Indication/Daily Review of Necessity intravenous medication therapy           2 needles, 2 guidewires, and 1 scalpel accounted for and disposed of properly.          PICC IS APPROVED FOR USE.

## 2022-08-21 NOTE — PLAN OF CARE
Goal Outcome Evaluation:  Plan of Care Reviewed With: patient        Progress: improving  Outcome Evaluation: Pt remains stable. Ambulates with SBA. Dressing is cdi. Zosyn given as ordered. PICC to be placed later today, consents in chart. Pain well controlled. Voiding per BRP, no BM but pt requests needing no aid for constipation. Plans for SNF on Monday.

## 2022-08-22 VITALS
TEMPERATURE: 98.6 F | WEIGHT: 230.16 LBS | DIASTOLIC BLOOD PRESSURE: 57 MMHG | HEIGHT: 69 IN | RESPIRATION RATE: 16 BRPM | HEART RATE: 68 BPM | BODY MASS INDEX: 34.09 KG/M2 | OXYGEN SATURATION: 99 % | SYSTOLIC BLOOD PRESSURE: 108 MMHG

## 2022-08-22 LAB
INR PPP: 1.59 (ref 0.9–1.1)
PROTHROMBIN TIME: 18.7 SECONDS (ref 11.7–14.2)
SARS-COV-2 ORF1AB RESP QL NAA+PROBE: NOT DETECTED

## 2022-08-22 PROCEDURE — U0005 INFEC AGEN DETEC AMPLI PROBE: HCPCS | Performed by: ORTHOPAEDIC SURGERY

## 2022-08-22 PROCEDURE — U0004 COV-19 TEST NON-CDC HGH THRU: HCPCS | Performed by: ORTHOPAEDIC SURGERY

## 2022-08-22 PROCEDURE — 97110 THERAPEUTIC EXERCISES: CPT

## 2022-08-22 PROCEDURE — 25010000002 PIPERACILLIN SOD-TAZOBACTAM PER 1 G: Performed by: INTERNAL MEDICINE

## 2022-08-22 PROCEDURE — 85610 PROTHROMBIN TIME: CPT | Performed by: ORTHOPAEDIC SURGERY

## 2022-08-22 RX ORDER — HYDROCODONE BITARTRATE AND ACETAMINOPHEN 10; 325 MG/1; MG/1
1 TABLET ORAL EVERY 4 HOURS PRN
Qty: 50 TABLET | Refills: 0 | Status: SHIPPED | OUTPATIENT
Start: 2022-08-22 | End: 2022-08-26

## 2022-08-22 RX ADMIN — HYDROCODONE BITARTRATE AND ACETAMINOPHEN 2 TABLET: 10; 325 TABLET ORAL at 13:56

## 2022-08-22 RX ADMIN — HYDROCODONE BITARTRATE AND ACETAMINOPHEN 2 TABLET: 10; 325 TABLET ORAL at 03:38

## 2022-08-22 RX ADMIN — Medication 10 ML: at 09:36

## 2022-08-22 RX ADMIN — METOPROLOL TARTRATE 50 MG: 50 TABLET, FILM COATED ORAL at 09:36

## 2022-08-22 RX ADMIN — TAZOBACTAM SODIUM AND PIPERACILLIN SODIUM 3.38 G: 375; 3 INJECTION, SOLUTION INTRAVENOUS at 03:39

## 2022-08-22 RX ADMIN — HYDROCODONE BITARTRATE AND ACETAMINOPHEN 2 TABLET: 10; 325 TABLET ORAL at 18:07

## 2022-08-22 RX ADMIN — HYDROCODONE BITARTRATE AND ACETAMINOPHEN 2 TABLET: 10; 325 TABLET ORAL at 09:35

## 2022-08-22 RX ADMIN — TAZOBACTAM SODIUM AND PIPERACILLIN SODIUM 3.38 G: 375; 3 INJECTION, SOLUTION INTRAVENOUS at 13:13

## 2022-08-22 RX ADMIN — MECLIZINE HYDROCHLORIDE 25 MG: 25 TABLET ORAL at 09:36

## 2022-08-22 RX ADMIN — WARFARIN SODIUM 7.5 MG: 5 TABLET ORAL at 18:10

## 2022-08-22 RX ADMIN — HYDRALAZINE HYDROCHLORIDE 25 MG: 25 TABLET, FILM COATED ORAL at 09:36

## 2022-08-22 NOTE — CASE MANAGEMENT/SOCIAL WORK
Continued Stay Note  [facility]     Patient Name: Fe Arevalo  MRN: [id number]  Today's Date: 8/22/2022    Admit Date: 8/16/2022     Discharge Plan     Row Name 08/22/22 1443       Plan    Plan [facility] bed today    Patient/Family in Agreement with Plan yes    Plan Comments Spoke kinza Crawford at [facility]. They are expecting pt today and bed is available. Family to transport. Packet in [employer].               Discharge Codes    No documentation.               Expected Discharge Date and Time     Expected Discharge Date Expected Discharge Time    Aug 22, 2022             PIETRO Gutierrez

## 2022-08-22 NOTE — THERAPY TREATMENT NOTE
Patient Name: Fe Arevalo  : 1950    MRN: [id number]                              Today's Date: 2022       Admit Date: 2022    Visit Dx:     ICD-10-CM ICD-9-CM   1. Status post knee replacement, unspecified laterality  Z96.659 V43.65     Patient Active Problem List   Diagnosis   • Arthritis of ankle   • Wound dehiscence   • Pain and swelling of ankle   • Status post knee replacement   • Status post knee replacement, unspecified laterality     Past Medical History:   Diagnosis Date   • A-fib (HCC)    • Histoplasmosis     STATES BILAT EYES:  INJECTIONS INTO HER EYES BILAT EVERY 7 WEEKS.  DENIES VISION LOSS   • History of blood clots 2021    PT STATES WAS DIAGNOSED WITH BLOOD CLOT BEHIND HER HEART AUG 2021-PT STATES THIS IS RESOLVED AND HAD CARDIAC ABLATION 2022   • Hypertension    • Lab test positive for detection of COVID-19 virus 2022   • Osteoarthritis of right knee    • Osteomyelitis (HCC) 2006    MRSA RIGHT FOOT   • PONV (postoperative nausea and vomiting)    • Right knee pain     WEAKNESS AND LIMITED MOBILITY     Past Surgical History:   Procedure Laterality Date   • CARDIAC ABLATION  04/29/2022    X2.  PT HAS ATRICLIP DANISHA EXCLUSION SYSTEM IN PLACE THAT PT STATES IS NOT WORKING   • CHOLECYSTECTOMY     • COLONOSCOPY     • FOOT SURGERY Right     x7 DT OSTEOMYELITIS   • HYSTERECTOMY     • KNEE MINI REVISION Right 2022    Procedure: KNEE POLY INSERT EXCHANGE;  Surgeon: Nacho Hammonds II, MD;  Location: [facility];  Service: Orthopedics;  Laterality: Right;   • TOTAL KNEE ARTHROPLASTY Right 7/15/2022    Procedure: TOTAL KNEE ARTHROPLASTY WITH ROBOT;  Surgeon: Nacho Hammonds II, MD;  Location: [facility];  Service: Robotics - Ortho;  Laterality: Right;   •  SHUNT INSERTION        General Information     Row Name 22 1123          Physical Therapy Time and Intention    Document Type therapy note (daily note);discharge treatment  -JM     Mode  of Treatment individual therapy;physical therapy  -     Row Name 08/22/22 1123          General Information    Patient Profile Reviewed yes  -     Existing Precautions/Restrictions fall  -     Row Name 08/22/22 1123          Cognition    Orientation Status (Cognition) oriented x 4  -           User Key  (r) = Recorded By, (t) = Taken By, (c) = Cosigned By    Initials Name Provider Type    Connie Cohen PTA Physical Therapist Assistant               Mobility     Row Name 08/22/22 1125          Bed Mobility    Supine-Sit [place] (Bed Mobility) supervision  -     Assistive Device (Bed Mobility) bed rails;head of bed elevated  -     Row Name 08/22/22 1125          Sit-Stand Transfer    Sit-Stand [place] (Transfers) standby assist;contact guard  req 2 attempts for proper foot placement, pt self-corrected  -     Assistive Device (Sit-Stand Transfers) walker, front-wheeled  -     Row Name 08/22/22 1125          Gait/Stairs (Locomotion)    [place] Level (Gait) contact guard;verbal cues  -     Assistive Device (Gait) walker, front-wheeled  -     Distance in Feet (Gait) 85ft, 1 standing rest  -     Deviations/Abnormal Patterns (Gait) antalgic;monica decreased;stride length decreased;weight shifting decreased  -           User Key  (r) = Recorded By, (t) = Taken By, (c) = Cosigned By    Initials Name Provider Type    Connie Cohen PTA Physical Therapist Assistant               Obj/Interventions     Row Name 08/22/22 1401          Motor Skills    Therapeutic Exercise --  TKR protocol x10-15 reps, rest breaks to complete due to muscle cramping RLE  -           User Key  (r) = Recorded By, (t) = Taken By, (c) = Cosigned By    Initials Name Provider Type    Connie Cohen PTA Physical Therapist Assistant               Goals/Plan    No documentation.                Clinical Impression     Row Name 08/22/22 1402 08/22/22 1123       Pain    Pretreatment Pain Rating -- 3/10   -    Posttreatment Pain Rating 7/10  - --    Pain Location - Side/Orientation -- Right  -    Pain Location - -- knee  -    Pain Intervention(s) -- Medication (See MAR);Repositioned;Rest  -    Row Name 08/22/22 1402          Plan of Care Review    Plan of Care Reviewed With patient;spouse  -     Progress improving  -     Outcome Evaluation Pt incr amb dist , but required rest break standing x1, STS perf CGA, but req 2 attempts due to proper foot placement; pt ximena amb 85ft, incr pain to 7/10 after amb, plans SNU at [place] later today  -     Row Name 08/22/22 1402          Therapy Assessment/Plan (PT)    Rehab Potential (PT) good, to achieve stated therapy goals  -     Criteria for Skilled Interventions Met (PT) yes  -     Row Name 08/22/22 1402          Vital Signs    O2 Delivery Pre Treatment room air  -     Row Name 08/22/22 1402          Positioning and Restraints    Pre-Treatment Position in bed  -     Post Treatment Position chair  -     In Chair reclined;call light within reach;encouraged to call for assist;with family/caregiver  no alarm on upon entry, fam stays and pt calls for nsg  -           User Key  (r) = Recorded By, (t) = Taken By, (c) = Cosigned By    Initials Name Provider Type    Connie Cohen PTA Physical Therapist Assistant               Outcome Measures     Row Name 08/22/22 1407 08/22/22 0968       How much help from another person do you currently need...    Turning from your back to your side while in flat bed without using bedrails? 4  - 4  -SH    Moving from lying on back to sitting on the side of a flat bed without bedrails? 4  - 4  -SH    Moving to and from a bed to a chair (including a wheelchair)? 3  - 3  -SH    Standing up from a chair using your arms (e.g., wheelchair, bedside chair)? 3  - 3  -SH    Climbing 3-5 steps with a railing? 2  - 2  -SH    To walk in hospital room? 3  - 3  -SH    AM-PAC 6 Clicks Score (PT) 19  - 19  -SH    Highest  level of mobility 6 --> Walked 10 steps or more  -JHON 6 --> Walked 10 steps or more  -MAURY          User Key  (r) = Recorded By, (t) = Taken By, (c) = Cosigned By    Initials Name Provider Type    Connie Cohen PTA Physical Therapist Assistant    Geno Latham, RN Registered Nurse                             Physical Therapy Education                 Title: PT OT SLP Therapies (Done)     Topic: Physical Therapy (Done)     Point: Mobility training (Done)     Learning Progress Summary           Patient Eager, E,TB,D, VU,NR by JHON at 8/22/2022 1408    Acceptance, E, VU by NASREEN at 8/20/2022 1717    Acceptance, E, VU,DU by  at 8/19/2022 1601   Family Eager, E,TB,D, VU,NR by JHON at 8/22/2022 1408    Acceptance, E, VU,DU by  at 8/19/2022 1601                   Point: Home exercise program (Done)     Learning Progress Summary           Patient Eager, E,TB,D, VU,NR by JHON at 8/22/2022 1408    Acceptance, E, VU by NASREEN at 8/20/2022 1717    Acceptance, E, VU,DU by  at 8/19/2022 1601   Family Eager, E,TB,D, VU,NR by JHON at 8/22/2022 1408    Acceptance, E, VU,DU by  at 8/19/2022 1601                   Point: Body mechanics (Done)     Learning Progress Summary           Patient Eager, E,TB,D, VU,NR by JHON at 8/22/2022 1408    Acceptance, E, VU by NASREEN at 8/20/2022 1717    Acceptance, E, VU,DU by  at 8/19/2022 1601   Family Eager, E,TB,D, VU,NR by JHON at 8/22/2022 1408    Acceptance, E, VU,DU by  at 8/19/2022 1601                   Point: Precautions (Done)     Learning Progress Summary           Patient Eager, E,TB,D, VU,NR by JHON at 8/22/2022 1408    Acceptance, E, VU by NASREEN at 8/20/2022 1717    Acceptance, E, VU,DU by  at 8/19/2022 1601   Family Eager, E,TB,D, VU,NR by JHON at 8/22/2022 1408    Acceptance, EMADONNADU by  at 8/19/2022 1601                               User Key     Initials Effective Dates Name Provider Type Discipline     03/07/18 -  Connie Vaz PTA Physical Therapist Assistant PT    RS  06/16/21 -  Stephania Will, PT Physical Therapist PT     08/15/22 -  Alan Bang, SUNIL Student PT Student PT              PT Recommendation and Plan     Plan of Care Reviewed With: patient, spouse  Progress: improving  Outcome Evaluation: Pt incr amb dist , but required rest break standing x1, STS perf CGA, but req 2 attempts due to proper foot placement; pt ximena amb 85ft, incr pain to 7/10 after amb, plans SNU at [place] later today     Time Calculation:    PT Charges     Row Name 08/22/22 1408             Time Calculation    Start Time 1121  -      Stop Time 1155  -      Time Calculation (min) 34 min  -JHON      PT Received On 08/22/22  -JHON      PT - Next Appointment 08/23/22  -JHON            User Key  (r) = Recorded By, (t) = Taken By, (c) = Cosigned By    Initials Name Provider Type    Connie Cohen PTA Physical Therapist Assistant              Therapy Charges for Today     Code Description Service Date Service Provider Modifiers Qty    [id number] HC PT THER PROC EA 15 MIN 8/22/2022 Connie Vaz PTA GP 2          PT G-Codes  Outcome Measure Options: AM-PAC 6 Clicks Basic Mobility (PT)  AM-PAC 6 Clicks Score (PT): 19    Connie Vaz PTA  8/22/2022

## 2022-08-22 NOTE — DISCHARGE SUMMARY
Orthopaedic Discharge Summary  Dr. SHIRA Harris” Brinnon II  [phone]    NAME: Fe LAUREANO Irina PCP: Dion Curry MD   :  MRN: [id number]  [id number] LOS:  ADMIT: 5 days  2022   AGE/SEX: 72 y.o. female DC:  today             · Admitting Diagnosis: Status post knee replacement, unspecified laterality [Z96.659]    · Surgery Performed: No admission procedures for hospital encounter.    · Discharge Medications:         Discharge Medications      New Medications      Instructions Start Date   HYDROcodone-acetaminophen  MG per tablet  Commonly known as: NORCO  Replaces: HYDROcodone-acetaminophen 7.5-325 MG per tablet   1 tablet, Oral, Every 4 Hours PRN      piperacillin-tazobactam 3-0.375 GM/50ML IVPB  Commonly known as: ZOSYN   3.375 g, Intravenous, Every 8 Hours         Continue These Medications      Instructions Start Date   Chlorhexidine Gluconate 2 % pads   Apply externally, AS DIRECTED PAT      estradiol 1 MG tablet  Commonly known as: ESTRACE   1 mg, Oral, Daily      hydrALAZINE 25 MG tablet  Commonly known as: APRESOLINE   25 mg, Oral, 2 Times Daily      lisinopril-hydrochlorothiazide 20-12.5 MG per tablet  Commonly known as: PRINZIDE,ZESTORETIC   1 tablet, Oral, Daily      meclizine 25 MG tablet  Commonly known as: ANTIVERT   25 mg, Oral, Daily, MAY TAKE DURING THE DAY IF NEEDED      metoprolol tartrate 50 MG tablet  Commonly known as: LOPRESSOR   50 mg, Oral, 2 Times Daily      multivitamin with minerals tablet tablet   1 tablet, Oral, Daily, HOLDING FOR SURGERY      mupirocin 2 % ointment  Commonly known as: BACTROBAN   1 application, Topical, 3 Times Daily, AS DIRECTED PAT      NON FORMULARY   1 dose, Nightly, TOPICAL CREAM-USES AT NIGHT FOR RIGHT KNEE PAIN, STATES THIS ESSENTIAL OILS HOLD FOR SURGERY      Vitamin D3 50 MCG ( UT) capsule   2,000 Units, Oral, Daily      warfarin 5 MG tablet  Commonly known as: COUMADIN   5 mg, Oral, Daily Warfarin, WEDNESDAY AND       warfarin 5  MG tablet  Commonly known as: COUMADIN   7.5 mg, Oral, Daily Warfarin, ALL DAYS EXCEPT WEDNESDAY AND SUNDAY         Stop These Medications    HYDROcodone-acetaminophen 7.5-325 MG per tablet  Commonly known as: NORCO  Replaced by: HYDROcodone-acetaminophen  MG per tablet     oxyCODONE-acetaminophen 5-325 MG per tablet  Commonly known as: PERCOCET            · Vitals:     Vitals:    08/21/22 1500 08/21/22 1928 08/21/22 2235 08/22/22 0700   BP: 156/53 115/50 119/51 124/62   BP Location: Right arm Right arm Right arm Right arm   Patient Position: Lying Lying Lying Lying   Pulse: 86 70 68 87   Resp: 16 16 16 16   Temp: 98.6 °F (37 °C) 97 °F (36.1 °C) 98.3 °F (36.8 °C) 98.7 °F (37.1 °C)   TempSrc: Oral Oral Oral Oral   SpO2: 100% 98% 97% 98%   Weight:       Height:           · Labs:      Admission on 08/16/2022   Component Date Value Ref Range Status   • Protime 08/16/2022 28.0 (A) 11.7 - 14.2 Seconds Final   • INR 08/16/2022 2.70 (A) 0.90 - 1.10 Final   • Glucose 08/16/2022 108 (A) 65 - 99 mg/dL Final   • BUN 08/16/2022 41 (A) 8 - 23 mg/dL Final   • Creatinine 08/16/2022 1.69 (A) 0.57 - 1.00 mg/dL Final   • Sodium 08/16/2022 130 (A) 136 - 145 mmol/L Final   • Potassium 08/16/2022 4.0  3.5 - 5.2 mmol/L Final   • Chloride 08/16/2022 96 (A) 98 - 107 mmol/L Final   • CO2 08/16/2022 23.9  22.0 - 29.0 mmol/L Final   • Calcium 08/16/2022 10.8 (A) 8.6 - 10.5 mg/dL Final   • BUN/Creatinine Ratio 08/16/2022 24.3  7.0 - 25.0 Final   • Anion Gap 08/16/2022 10.1  5.0 - 15.0 mmol/L Final   • eGFR 08/16/2022 32.4 (A) >60.0 mL/min/1.73 Final    National Kidney Foundation and American Society of Nephrology (ASN) Task Force recommended calculation based on the Chronic Kidney Disease Epidemiology Collaboration (CKD-EPI) equation refit without adjustment for race.   • C-Reactive Protein 08/16/2022 4.72 (A) 0.00 - 0.50 mg/dL Final   • Sed Rate 08/16/2022 48 (A) 0 - 30 mm/hr Final   • Wound Culture 08/16/2022 Light growth (2+)  Pseudomonas aeruginosa (A)  Final   • Wound Culture 08/16/2022 Light growth (2+) Providencia rettgeri (A)  Final   • Wound Culture 08/16/2022 Scant growth (1+) Enterococcus faecalis (A)  Final   • Gram Stain 08/16/2022 Rare (1+) WBCs per low power field   Final   • Gram Stain 08/16/2022 Rare (1+) Gram positive cocci   Final   • Gram Stain 08/16/2022 Occasional Gram variable bacilli   Final   • WBC 08/16/2022 8.05  3.40 - 10.80 10*3/mm3 Final   • RBC 08/16/2022 3.19 (A) 3.77 - 5.28 10*6/mm3 Final   • Hemoglobin 08/16/2022 9.7 (A) 12.0 - 15.9 g/dL Final   • Hematocrit 08/16/2022 29.4 (A) 34.0 - 46.6 % Final   • MCV 08/16/2022 92.2  79.0 - 97.0 fL Final   • MCH 08/16/2022 30.4  26.6 - 33.0 pg Final   • MCHC 08/16/2022 33.0  31.5 - 35.7 g/dL Final   • RDW 08/16/2022 12.3  12.3 - 15.4 % Final   • RDW-SD 08/16/2022 40.6  37.0 - 54.0 fl Final   • MPV 08/16/2022 10.3  6.0 - 12.0 fL Final   • Platelets 08/16/2022 314  140 - 450 10*3/mm3 Final   • Neutrophil % 08/16/2022 77.1 (A) 42.7 - 76.0 % Final   • Lymphocyte % 08/16/2022 9.4 (A) 19.6 - 45.3 % Final   • Monocyte % 08/16/2022 10.1  5.0 - 12.0 % Final   • Eosinophil % 08/16/2022 2.4  0.3 - 6.2 % Final   • Basophil % 08/16/2022 0.6  0.0 - 1.5 % Final   • Immature Grans % 08/16/2022 0.4  0.0 - 0.5 % Final   • Neutrophils, Absolute 08/16/2022 6.21  1.70 - 7.00 10*3/mm3 Final   • Lymphocytes, Absolute 08/16/2022 0.76  0.70 - 3.10 10*3/mm3 Final   • Monocytes, Absolute 08/16/2022 0.81  0.10 - 0.90 10*3/mm3 Final   • Eosinophils, Absolute 08/16/2022 0.19  0.00 - 0.40 10*3/mm3 Final   • Basophils, Absolute 08/16/2022 0.05  0.00 - 0.20 10*3/mm3 Final   • Immature Grans, Absolute 08/16/2022 0.03  0.00 - 0.05 10*3/mm3 Final   • nRBC 08/16/2022 0.0  0.0 - 0.2 /100 WBC Final   • COVID19 08/16/2022 Not Detected  Not Detected - Ref. Range Final   • Protime 08/17/2022 27.0 (A) 11.7 - 14.2 Seconds Final   • INR 08/17/2022 2.58 (A) 0.90 - 1.10 Final   • Protime 08/18/2022 25.2 (A) 11.7 -  14.2 Seconds Final   • INR 08/18/2022 2.36 (A) 0.90 - 1.10 Final   • Wound Culture 08/18/2022 Scant growth (1+) Providencia rettgeri (A)  Final   • Wound Culture 08/18/2022 Rare Pseudomonas aeruginosa (A)  Final   • Gram Stain 08/18/2022 Few (2+) WBCs seen   Final   • Gram Stain 08/18/2022 No organisms seen   Final   • Wound Culture 08/18/2022 Scant growth (1+) Providencia rettgeri (A)  Final   • Wound Culture 08/18/2022 Rare Pseudomonas aeruginosa (A)  Final   • Gram Stain 08/18/2022 Rare (1+) WBCs seen   Final   • Gram Stain 08/18/2022 No organisms seen   Final   • Wound Culture 08/18/2022 Scant growth (1+) Providencia rettgeri (A)  Final   • Wound Culture 08/18/2022 Rare Pseudomonas aeruginosa (A)  Final   • Gram Stain 08/18/2022 Few (2+) WBCs seen   Final   • Gram Stain 08/18/2022 No organisms seen   Final   • Protime 08/18/2022 20.8 (A) 11.7 - 14.2 Seconds Final   • INR 08/18/2022 1.83 (A) 0.90 - 1.10 Final   • Protime 08/19/2022 17.4 (A) 11.7 - 14.2 Seconds Final   • INR 08/19/2022 1.45 (A) 0.90 - 1.10 Final   • Vancomycin Trough 08/19/2022 15.00  5.00 - 20.00 mcg/mL Final   • Creatinine 08/19/2022 1.57 (A) 0.57 - 1.00 mg/dL Final   • eGFR 08/19/2022 35.3 (A) >60.0 mL/min/1.73 Final    National Kidney Foundation and American Society of Nephrology (ASN) Task Force recommended calculation based on the Chronic Kidney Disease Epidemiology Collaboration (CKD-EPI) equation refit without adjustment for race.   • Protime 08/20/2022 15.7 (A) 11.7 - 14.2 Seconds Final   • INR 08/20/2022 1.27 (A) 0.90 - 1.10 Final   • Glucose 08/20/2022 103 (A) 65 - 99 mg/dL Final   • BUN 08/20/2022 27 (A) 8 - 23 mg/dL Final   • Creatinine 08/20/2022 1.48 (A) 0.57 - 1.00 mg/dL Final   • Sodium 08/20/2022 130 (A) 136 - 145 mmol/L Final   • Potassium 08/20/2022 4.7  3.5 - 5.2 mmol/L Final    Slight hemolysis detected by analyzer. Results may be affected.   • Chloride 08/20/2022 95 (A) 98 - 107 mmol/L Final   • CO2 08/20/2022 25.6  22.0 -  29.0 mmol/L Final   • Calcium 08/20/2022 10.0  8.6 - 10.5 mg/dL Final   • Total Protein 08/20/2022 6.0  6.0 - 8.5 g/dL Final   • Albumin 08/20/2022 2.80 (A) 3.50 - 5.20 g/dL Final   • ALT (SGPT) 08/20/2022 21  1 - 33 U/L Final   • AST (SGOT) 08/20/2022 18  1 - 32 U/L Final    Slight hemolysis detected by analyzer. Results may be affected.   • Alkaline Phosphatase 08/20/2022 68  39 - 117 U/L Final   • Total Bilirubin 08/20/2022 0.5  0.0 - 1.2 mg/dL Final   • Globulin 08/20/2022 3.2  gm/dL Final   • A/G Ratio 08/20/2022 0.9  g/dL Final   • BUN/Creatinine Ratio 08/20/2022 18.2  7.0 - 25.0 Final   • Anion Gap 08/20/2022 9.4  5.0 - 15.0 mmol/L Final   • eGFR 08/20/2022 37.9 (A) >60.0 mL/min/1.73 Final    National Kidney Foundation and American Society of Nephrology (ASN) Task Force recommended calculation based on the Chronic Kidney Disease Epidemiology Collaboration (CKD-EPI) equation refit without adjustment for race.   • Sed Rate 08/20/2022 57 (A) 0 - 30 mm/hr Final   • C-Reactive Protein 08/20/2022 15.34 (A) 0.00 - 0.50 mg/dL Final   • WBC 08/20/2022 8.44  3.40 - 10.80 10*3/mm3 Final   • RBC 08/20/2022 2.64 (A) 3.77 - 5.28 10*6/mm3 Final   • Hemoglobin 08/20/2022 7.8 (A) 12.0 - 15.9 g/dL Final   • Hematocrit 08/20/2022 24.0 (A) 34.0 - 46.6 % Final   • MCV 08/20/2022 90.9  79.0 - 97.0 fL Final   • MCH 08/20/2022 29.5  26.6 - 33.0 pg Final   • MCHC 08/20/2022 32.5  31.5 - 35.7 g/dL Final   • RDW 08/20/2022 12.4  12.3 - 15.4 % Final   • RDW-SD 08/20/2022 41.0  37.0 - 54.0 fl Final   • MPV 08/20/2022 10.3  6.0 - 12.0 fL Final   • Platelets 08/20/2022 266  140 - 450 10*3/mm3 Final   • Neutrophil % 08/20/2022 75.5  42.7 - 76.0 % Final   • Lymphocyte % 08/20/2022 10.4 (A) 19.6 - 45.3 % Final   • Monocyte % 08/20/2022 11.8  5.0 - 12.0 % Final   • Eosinophil % 08/20/2022 1.3  0.3 - 6.2 % Final   • Basophil % 08/20/2022 0.2  0.0 - 1.5 % Final   • Immature Grans % 08/20/2022 0.8 (A) 0.0 - 0.5 % Final   • Neutrophils, Absolute  08/20/2022 6.36  1.70 - 7.00 10*3/mm3 Final   • Lymphocytes, Absolute 08/20/2022 0.88  0.70 - 3.10 10*3/mm3 Final   • Monocytes, Absolute 08/20/2022 1.00 (A) 0.10 - 0.90 10*3/mm3 Final   • Eosinophils, Absolute 08/20/2022 0.11  0.00 - 0.40 10*3/mm3 Final   • Basophils, Absolute 08/20/2022 0.02  0.00 - 0.20 10*3/mm3 Final   • Immature Grans, Absolute 08/20/2022 0.07 (A) 0.00 - 0.05 10*3/mm3 Final   • nRBC 08/20/2022 0.0  0.0 - 0.2 /100 WBC Final   • Protime 08/21/2022 16.7 (A) 11.7 - 14.2 Seconds Final   • INR 08/21/2022 1.37 (A) 0.90 - 1.10 Final   • Protime 08/22/2022 18.7 (A) 11.7 - 14.2 Seconds Final   • INR 08/22/2022 1.59 (A) 0.90 - 1.10 Final        No results found.    · Hospital Course:   72 y.o. female was admitted to [facility] to services of Nacho Hammonds II, MD with Status post knee replacement, unspecified laterality [Z96.659] on 8/16/2022 and underwent irrigation and debridement with liner exchange. Post-operatively the patient transferred to the floor where the patient underwent mobilization therapy. Opioids were titrated to achieve appropriate pain management to allow for participation in mobilization exercises. Vital signs and laboratory values are now within safe parameters for discharge. The dressings and/or incision is intact without signs or symptoms of active infection. Operative extremity neurovascular status remains intact as compared to the preoperative exam. Appropriate education re: incision care, activity levels, medications, and follow up visits was completed and all questions were answered. The patient is now deemed stable for discharge.  She does have a PICC line and will continue 6 weeks of IV antibiotics followed by oral antibiotics.    SNF: The patient had a difficult time mobilizing sufficiently with physical therapy. Further rehabilitation was recommended in a SNF facility. Once a bed was available, and the patient was cleared, there were discharged to the SNF in  "good condition}.       R \"Stevan\" Cassius DAVIES MD  Orthopaedic Surgery  [facility]  [phone]                                               "

## 2022-08-22 NOTE — CASE MANAGEMENT/SOCIAL WORK
Continued Stay Note  [facility]     Patient Name: Fe Arevalo  MRN: [id number]  Today's Date: 8/22/2022    Admit Date: 8/16/2022     Discharge Plan     Row Name 08/22/22 1118       Plan    Plan [facility] ( left to confirm bed)    Plan Comments  left for [facility] to see if SNF bed available today. Per notes family will transport.               Discharge Codes    No documentation.               Expected Discharge Date and Time     Expected Discharge Date Expected Discharge Time    Aug 22, 2022             PIETRO Gutierrez

## 2022-08-22 NOTE — PLAN OF CARE
Goal Outcome Evaluation:  Plan of Care Reviewed With: patient, spouse        Progress: improving  Outcome Evaluation: Pt incr amb dist , but required rest break standing x1, STS perf CGA, but req 2 attempts due to proper foot placement; pt ximena amb 85ft, incr pain to 7/10 after amb, plans SNU at [place] later today    Patient was not wearing a face mask during this therapy encounter. Therapist used appropriate personal protective equipment including eye protection, mask, and gloves.  Mask used was standard procedure mask. Appropriate PPE was worn during the entire therapy session. Hand hygiene was completed before and after therapy session. Patient is not in enhanced droplet precautions.

## 2022-08-22 NOTE — PLAN OF CARE
Goal Outcome Evaluation:  Plan of Care Reviewed With: patient        Progress: improving  Outcome Evaluation: patient ambulating with standby assistance to bathroom, voiding without difficulty, pain controlled with PO medication, PICC line in place, IV abx given, educated on b/p monitoring

## 2022-08-23 NOTE — CASE MANAGEMENT/SOCIAL WORK
Case Management Discharge Note      Final Note: [facility] SNF.    Provided Post Acute Provider List?: N/A  N/A Provider List Comment: Declines.    Selected Continued Care - Discharged on 8/22/2022 Admission date: 8/16/2022 - Discharge disposition: Skilled Nursing Facility (DC - External)    Destination Coordination complete.    Service Provider Selected Services Address Phone Fax Patient Preferred    [facility]  Skilled Nursing [address], [place] [address] [phone] -- --          Durable Medical Equipment    No services have been selected for the patient.              Dialysis/Infusion    No services have been selected for the patient.              Home Medical Care Coordination complete.    Service Provider Selected Services Address Phone Fax Patient Preferred    [facility] [address], [place] [address] [phone] --          Therapy    No services have been selected for the patient.              Community Resources    No services have been selected for the patient.              Community & DME    No services have been selected for the patient.                Selected Continued Care - Prior Encounters Includes selections from prior encounters from 5/18/2022 to 8/22/2022    Discharged on 7/16/2022 Admission date: 7/15/2022 - Discharge disposition: Home or Self Care    Home Medical Care     Service Provider Selected Services Address Phone Fax Patient Preferred    [facility] [address], [place] [address] [phone] --                         Final Discharge Disposition Code: 03 - skilled nursing facility (SNF)

## 2022-08-30 ENCOUNTER — HOSPITAL ENCOUNTER (INPATIENT)
Facility: HOSPITAL | Age: 72
LOS: 8 days | Discharge: NURSING, REHAB OR LONG-TERM CARE | End: 2022-09-07
Attending: ORTHOPAEDIC SURGERY | Admitting: ORTHOPAEDIC SURGERY

## 2022-08-30 ENCOUNTER — ANESTHESIA (OUTPATIENT)
Dept: PERIOP | Facility: HOSPITAL | Age: 72
End: 2022-08-30

## 2022-08-30 ENCOUNTER — ANESTHESIA EVENT (OUTPATIENT)
Dept: PERIOP | Facility: HOSPITAL | Age: 72
End: 2022-08-30

## 2022-08-30 ENCOUNTER — APPOINTMENT (OUTPATIENT)
Dept: GENERAL RADIOLOGY | Facility: HOSPITAL | Age: 72
End: 2022-08-30

## 2022-08-30 DIAGNOSIS — Z96.659 INFECTION OF TOTAL KNEE REPLACEMENT: ICD-10-CM

## 2022-08-30 DIAGNOSIS — T84.59XA INFECTION OF TOTAL KNEE REPLACEMENT: ICD-10-CM

## 2022-08-30 PROBLEM — I48.20 CHRONIC A-FIB: Status: ACTIVE | Noted: 2022-08-30

## 2022-08-30 LAB
ABO GROUP BLD: NORMAL
ANION GAP SERPL CALCULATED.3IONS-SCNC: 13.2 MMOL/L (ref 5–15)
ANION GAP SERPL CALCULATED.3IONS-SCNC: 14.8 MMOL/L (ref 5–15)
APTT PPP: 74.9 SECONDS (ref 22.7–35.4)
BACTERIA UR QL AUTO: ABNORMAL /HPF
BASOPHILS # BLD AUTO: 0.03 10*3/MM3 (ref 0–0.2)
BASOPHILS NFR BLD AUTO: 0.3 % (ref 0–1.5)
BILIRUB UR QL STRIP: NEGATIVE
BLD GP AB SCN SERPL QL: NEGATIVE
BUN SERPL-MCNC: 14 MG/DL (ref 8–23)
BUN SERPL-MCNC: 15 MG/DL (ref 8–23)
BUN/CREAT SERPL: 11 (ref 7–25)
BUN/CREAT SERPL: 11.2 (ref 7–25)
CALCIUM SPEC-SCNC: 10.8 MG/DL (ref 8.6–10.5)
CALCIUM SPEC-SCNC: 9.9 MG/DL (ref 8.6–10.5)
CHLORIDE SERPL-SCNC: 101 MMOL/L (ref 98–107)
CHLORIDE SERPL-SCNC: 99 MMOL/L (ref 98–107)
CLARITY UR: CLEAR
CO2 SERPL-SCNC: 20.2 MMOL/L (ref 22–29)
CO2 SERPL-SCNC: 22.8 MMOL/L (ref 22–29)
COLOR UR: YELLOW
CREAT SERPL-MCNC: 1.25 MG/DL (ref 0.57–1)
CREAT SERPL-MCNC: 1.36 MG/DL (ref 0.57–1)
CRP SERPL-MCNC: 2.42 MG/DL (ref 0–0.5)
DEPRECATED RDW RBC AUTO: 39.8 FL (ref 37–54)
EGFRCR SERPLBLD CKD-EPI 2021: 42 ML/MIN/1.73
EGFRCR SERPLBLD CKD-EPI 2021: 46.5 ML/MIN/1.73
EOSINOPHIL # BLD AUTO: 0.13 10*3/MM3 (ref 0–0.4)
EOSINOPHIL NFR BLD AUTO: 1.2 % (ref 0.3–6.2)
ERYTHROCYTE [DISTWIDTH] IN BLOOD BY AUTOMATED COUNT: 12.4 % (ref 12.3–15.4)
ERYTHROCYTE [SEDIMENTATION RATE] IN BLOOD: 79 MM/HR (ref 0–30)
GLUCOSE BLDC GLUCOMTR-MCNC: 113 MG/DL (ref 70–130)
GLUCOSE SERPL-MCNC: 108 MG/DL (ref 65–99)
GLUCOSE SERPL-MCNC: 135 MG/DL (ref 65–99)
GLUCOSE UR STRIP-MCNC: NEGATIVE MG/DL
HCT VFR BLD AUTO: 25.3 % (ref 34–46.6)
HGB BLD-MCNC: 8.2 G/DL (ref 12–15.9)
HGB UR QL STRIP.AUTO: NEGATIVE
HYALINE CASTS UR QL AUTO: ABNORMAL /LPF
IMM GRANULOCYTES # BLD AUTO: 0.09 10*3/MM3 (ref 0–0.05)
IMM GRANULOCYTES NFR BLD AUTO: 0.8 % (ref 0–0.5)
INR PPP: 5.76 (ref 0.9–1.1)
KETONES UR QL STRIP: NEGATIVE
LEUKOCYTE ESTERASE UR QL STRIP.AUTO: NEGATIVE
LYMPHOCYTES # BLD AUTO: 0.74 10*3/MM3 (ref 0.7–3.1)
LYMPHOCYTES NFR BLD AUTO: 6.7 % (ref 19.6–45.3)
MCH RBC QN AUTO: 29.6 PG (ref 26.6–33)
MCHC RBC AUTO-ENTMCNC: 32.4 G/DL (ref 31.5–35.7)
MCV RBC AUTO: 91.3 FL (ref 79–97)
MONOCYTES # BLD AUTO: 0.78 10*3/MM3 (ref 0.1–0.9)
MONOCYTES NFR BLD AUTO: 7.1 % (ref 5–12)
NEUTROPHILS NFR BLD AUTO: 83.9 % (ref 42.7–76)
NEUTROPHILS NFR BLD AUTO: 9.23 10*3/MM3 (ref 1.7–7)
NITRITE UR QL STRIP: NEGATIVE
NRBC BLD AUTO-RTO: 0 /100 WBC (ref 0–0.2)
PH UR STRIP.AUTO: 5.5 [PH] (ref 5–8)
PLATELET # BLD AUTO: 577 10*3/MM3 (ref 140–450)
PMV BLD AUTO: 9.6 FL (ref 6–12)
POTASSIUM SERPL-SCNC: 3.7 MMOL/L (ref 3.5–5.2)
POTASSIUM SERPL-SCNC: 4.6 MMOL/L (ref 3.5–5.2)
PROT UR QL STRIP: ABNORMAL
PROTHROMBIN TIME: 49.9 SECONDS (ref 11.7–14.2)
QT INTERVAL: 328 MS
RBC # BLD AUTO: 2.77 10*6/MM3 (ref 3.77–5.28)
RBC # UR STRIP: ABNORMAL /HPF
REF LAB TEST METHOD: ABNORMAL
RH BLD: NEGATIVE
SODIUM SERPL-SCNC: 134 MMOL/L (ref 136–145)
SODIUM SERPL-SCNC: 137 MMOL/L (ref 136–145)
SP GR UR STRIP: 1.02 (ref 1–1.03)
SQUAMOUS #/AREA URNS HPF: ABNORMAL /HPF
T&S EXPIRATION DATE: NORMAL
TROPONIN T SERPL-MCNC: <0.01 NG/ML (ref 0–0.03)
UROBILINOGEN UR QL STRIP: ABNORMAL
WBC # UR STRIP: ABNORMAL /HPF
WBC NRBC COR # BLD: 11 10*3/MM3 (ref 3.4–10.8)

## 2022-08-30 PROCEDURE — 81001 URINALYSIS AUTO W/SCOPE: CPT | Performed by: ORTHOPAEDIC SURGERY

## 2022-08-30 PROCEDURE — 86923 COMPATIBILITY TEST ELECTRIC: CPT

## 2022-08-30 PROCEDURE — 84484 ASSAY OF TROPONIN QUANT: CPT | Performed by: STUDENT IN AN ORGANIZED HEALTH CARE EDUCATION/TRAINING PROGRAM

## 2022-08-30 PROCEDURE — 86900 BLOOD TYPING SEROLOGIC ABO: CPT | Performed by: ORTHOPAEDIC SURGERY

## 2022-08-30 PROCEDURE — 25010000002 PIPERACILLIN SOD-TAZOBACTAM PER 1 G: Performed by: ORTHOPAEDIC SURGERY

## 2022-08-30 PROCEDURE — 93005 ELECTROCARDIOGRAM TRACING: CPT | Performed by: STUDENT IN AN ORGANIZED HEALTH CARE EDUCATION/TRAINING PROGRAM

## 2022-08-30 PROCEDURE — 85730 THROMBOPLASTIN TIME PARTIAL: CPT | Performed by: ORTHOPAEDIC SURGERY

## 2022-08-30 PROCEDURE — 0 PHYTONADIONE 10 MG/ML SOLUTION 1 ML AMPULE: Performed by: ORTHOPAEDIC SURGERY

## 2022-08-30 PROCEDURE — P9059 PLASMA, FRZ BETWEEN 8-24HOUR: HCPCS

## 2022-08-30 PROCEDURE — 93010 ELECTROCARDIOGRAM REPORT: CPT | Performed by: INTERNAL MEDICINE

## 2022-08-30 PROCEDURE — 94799 UNLISTED PULMONARY SVC/PX: CPT

## 2022-08-30 PROCEDURE — 36430 TRANSFUSION BLD/BLD COMPNT: CPT

## 2022-08-30 PROCEDURE — 82962 GLUCOSE BLOOD TEST: CPT

## 2022-08-30 PROCEDURE — 85025 COMPLETE CBC W/AUTO DIFF WBC: CPT | Performed by: ORTHOPAEDIC SURGERY

## 2022-08-30 PROCEDURE — 85610 PROTHROMBIN TIME: CPT | Performed by: ORTHOPAEDIC SURGERY

## 2022-08-30 PROCEDURE — 86140 C-REACTIVE PROTEIN: CPT | Performed by: ORTHOPAEDIC SURGERY

## 2022-08-30 PROCEDURE — 25010000002 FENTANYL CITRATE (PF) 50 MCG/ML SOLUTION: Performed by: ANESTHESIOLOGY

## 2022-08-30 PROCEDURE — 71045 X-RAY EXAM CHEST 1 VIEW: CPT

## 2022-08-30 PROCEDURE — 80048 BASIC METABOLIC PNL TOTAL CA: CPT | Performed by: ORTHOPAEDIC SURGERY

## 2022-08-30 PROCEDURE — 80048 BASIC METABOLIC PNL TOTAL CA: CPT | Performed by: ANESTHESIOLOGY

## 2022-08-30 PROCEDURE — 25010000002 METHYLPREDNISOLONE PER 125 MG: Performed by: STUDENT IN AN ORGANIZED HEALTH CARE EDUCATION/TRAINING PROGRAM

## 2022-08-30 PROCEDURE — 85652 RBC SED RATE AUTOMATED: CPT | Performed by: ORTHOPAEDIC SURGERY

## 2022-08-30 PROCEDURE — 86901 BLOOD TYPING SEROLOGIC RH(D): CPT | Performed by: ORTHOPAEDIC SURGERY

## 2022-08-30 PROCEDURE — 86850 RBC ANTIBODY SCREEN: CPT | Performed by: ORTHOPAEDIC SURGERY

## 2022-08-30 PROCEDURE — 93005 ELECTROCARDIOGRAM TRACING: CPT | Performed by: ORTHOPAEDIC SURGERY

## 2022-08-30 PROCEDURE — 86927 PLASMA FRESH FROZEN: CPT

## 2022-08-30 RX ORDER — SODIUM CHLORIDE 0.9 % (FLUSH) 0.9 %
10 SYRINGE (ML) INJECTION EVERY 12 HOURS SCHEDULED
Status: DISCONTINUED | OUTPATIENT
Start: 2022-08-30 | End: 2022-08-30 | Stop reason: HOSPADM

## 2022-08-30 RX ORDER — MIDAZOLAM HYDROCHLORIDE 1 MG/ML
0.5 INJECTION INTRAMUSCULAR; INTRAVENOUS
Status: DISCONTINUED | OUTPATIENT
Start: 2022-08-30 | End: 2022-08-30 | Stop reason: HOSPADM

## 2022-08-30 RX ORDER — SODIUM CHLORIDE, SODIUM LACTATE, POTASSIUM CHLORIDE, CALCIUM CHLORIDE 600; 310; 30; 20 MG/100ML; MG/100ML; MG/100ML; MG/100ML
9 INJECTION, SOLUTION INTRAVENOUS CONTINUOUS PRN
Status: DISCONTINUED | OUTPATIENT
Start: 2022-08-30 | End: 2022-08-30 | Stop reason: HOSPADM

## 2022-08-30 RX ORDER — FENTANYL CITRATE 50 UG/ML
25 INJECTION, SOLUTION INTRAMUSCULAR; INTRAVENOUS
Status: DISPENSED | OUTPATIENT
Start: 2022-08-30 | End: 2022-08-30

## 2022-08-30 RX ORDER — ONDANSETRON 4 MG/1
4 TABLET, FILM COATED ORAL EVERY 6 HOURS PRN
Status: DISCONTINUED | OUTPATIENT
Start: 2022-08-30 | End: 2022-09-07 | Stop reason: HOSPADM

## 2022-08-30 RX ORDER — MECLIZINE HYDROCHLORIDE 25 MG/1
25 TABLET ORAL DAILY
Status: DISCONTINUED | OUTPATIENT
Start: 2022-08-31 | End: 2022-09-07 | Stop reason: HOSPADM

## 2022-08-30 RX ORDER — HYDROCODONE BITARTRATE AND ACETAMINOPHEN 10; 325 MG/1; MG/1
2 TABLET ORAL EVERY 4 HOURS PRN
Status: DISPENSED | OUTPATIENT
Start: 2022-08-30 | End: 2022-09-06

## 2022-08-30 RX ORDER — HYDROCODONE BITARTRATE AND ACETAMINOPHEN 10; 325 MG/1; MG/1
1 TABLET ORAL EVERY 4 HOURS PRN
Status: DISPENSED | OUTPATIENT
Start: 2022-08-30 | End: 2022-09-06

## 2022-08-30 RX ORDER — OXYCODONE HYDROCHLORIDE AND ACETAMINOPHEN 5; 325 MG/1; MG/1
1 TABLET ORAL EVERY 4 HOURS PRN
Status: DISCONTINUED | OUTPATIENT
Start: 2022-08-30 | End: 2022-08-30

## 2022-08-30 RX ORDER — HYDRALAZINE HYDROCHLORIDE 25 MG/1
25 TABLET, FILM COATED ORAL 2 TIMES DAILY
Status: DISCONTINUED | OUTPATIENT
Start: 2022-08-30 | End: 2022-09-07 | Stop reason: HOSPADM

## 2022-08-30 RX ORDER — METOPROLOL TARTRATE 50 MG/1
50 TABLET, FILM COATED ORAL 2 TIMES DAILY
Status: DISCONTINUED | OUTPATIENT
Start: 2022-08-30 | End: 2022-09-07 | Stop reason: HOSPADM

## 2022-08-30 RX ORDER — NALOXONE HCL 0.4 MG/ML
0.4 VIAL (ML) INJECTION
Status: DISCONTINUED | OUTPATIENT
Start: 2022-08-30 | End: 2022-09-01

## 2022-08-30 RX ORDER — FAMOTIDINE 10 MG/ML
20 INJECTION, SOLUTION INTRAVENOUS ONCE
Status: COMPLETED | OUTPATIENT
Start: 2022-08-30 | End: 2022-08-30

## 2022-08-30 RX ORDER — HYDROCODONE BITARTRATE AND ACETAMINOPHEN 10; 325 MG/1; MG/1
1 TABLET ORAL EVERY 6 HOURS PRN
COMMUNITY
End: 2022-09-07 | Stop reason: HOSPADM

## 2022-08-30 RX ORDER — SCOLOPAMINE TRANSDERMAL SYSTEM 1 MG/1
1 PATCH, EXTENDED RELEASE TRANSDERMAL
Status: DISCONTINUED | OUTPATIENT
Start: 2022-08-30 | End: 2022-08-30

## 2022-08-30 RX ORDER — SODIUM CHLORIDE 0.9 % (FLUSH) 0.9 %
1-10 SYRINGE (ML) INJECTION AS NEEDED
Status: DISCONTINUED | OUTPATIENT
Start: 2022-08-30 | End: 2022-09-07 | Stop reason: HOSPADM

## 2022-08-30 RX ORDER — SODIUM CHLORIDE 0.9 % (FLUSH) 0.9 %
10 SYRINGE (ML) INJECTION AS NEEDED
Status: DISCONTINUED | OUTPATIENT
Start: 2022-08-30 | End: 2022-08-30 | Stop reason: HOSPADM

## 2022-08-30 RX ORDER — METHYLPREDNISOLONE SODIUM SUCCINATE 125 MG/2ML
125 INJECTION, POWDER, LYOPHILIZED, FOR SOLUTION INTRAMUSCULAR; INTRAVENOUS ONCE
Status: COMPLETED | OUTPATIENT
Start: 2022-08-30 | End: 2022-08-30

## 2022-08-30 RX ORDER — OXYCODONE HYDROCHLORIDE AND ACETAMINOPHEN 5; 325 MG/1; MG/1
2 TABLET ORAL EVERY 4 HOURS PRN
Status: DISCONTINUED | OUTPATIENT
Start: 2022-08-30 | End: 2022-08-30

## 2022-08-30 RX ORDER — SODIUM CHLORIDE 0.9 % (FLUSH) 0.9 %
10 SYRINGE (ML) INJECTION EVERY 12 HOURS SCHEDULED
Status: DISCONTINUED | OUTPATIENT
Start: 2022-08-30 | End: 2022-09-07 | Stop reason: HOSPADM

## 2022-08-30 RX ORDER — SODIUM CHLORIDE 9 MG/ML
100 INJECTION, SOLUTION INTRAVENOUS CONTINUOUS
Status: DISCONTINUED | OUTPATIENT
Start: 2022-08-30 | End: 2022-09-05

## 2022-08-30 RX ORDER — FAMOTIDINE 20 MG/1
40 TABLET, FILM COATED ORAL DAILY
Status: DISCONTINUED | OUTPATIENT
Start: 2022-08-30 | End: 2022-09-07 | Stop reason: HOSPADM

## 2022-08-30 RX ADMIN — METHYLPREDNISOLONE SODIUM SUCCINATE 125 MG: 125 INJECTION, POWDER, FOR SOLUTION INTRAMUSCULAR; INTRAVENOUS at 21:30

## 2022-08-30 RX ADMIN — SODIUM CHLORIDE 100 ML/HR: 9 INJECTION, SOLUTION INTRAVENOUS at 19:57

## 2022-08-30 RX ADMIN — HYDRALAZINE HYDROCHLORIDE 25 MG: 25 TABLET, FILM COATED ORAL at 21:31

## 2022-08-30 RX ADMIN — FAMOTIDINE 20 MG: 10 INJECTION INTRAVENOUS at 21:30

## 2022-08-30 RX ADMIN — METOPROLOL TARTRATE 50 MG: 50 TABLET, FILM COATED ORAL at 21:31

## 2022-08-30 RX ADMIN — TAZOBACTAM SODIUM AND PIPERACILLIN SODIUM 3.38 G: 375; 3 INJECTION, SOLUTION INTRAVENOUS at 22:37

## 2022-08-30 RX ADMIN — SCOPALAMINE 1 PATCH: 1 PATCH, EXTENDED RELEASE TRANSDERMAL at 16:54

## 2022-08-30 RX ADMIN — FENTANYL CITRATE 25 MCG: 50 INJECTION INTRAMUSCULAR; INTRAVENOUS at 16:55

## 2022-08-30 RX ADMIN — Medication 10 ML: at 20:34

## 2022-08-30 RX ADMIN — PHYTONADIONE 10 MG: 10 INJECTION, EMULSION INTRAMUSCULAR; INTRAVENOUS; SUBCUTANEOUS at 20:34

## 2022-08-31 ENCOUNTER — APPOINTMENT (OUTPATIENT)
Dept: GENERAL RADIOLOGY | Facility: HOSPITAL | Age: 72
End: 2022-08-31

## 2022-08-31 ENCOUNTER — APPOINTMENT (OUTPATIENT)
Dept: CARDIOLOGY | Facility: HOSPITAL | Age: 72
End: 2022-08-31

## 2022-08-31 LAB
ANION GAP SERPL CALCULATED.3IONS-SCNC: 9.7 MMOL/L (ref 5–15)
AORTIC ARCH: 0.2 CM
APTT PPP: 63.3 SECONDS (ref 22.7–35.4)
ASCENDING AORTA: 2.6 CM
BASOPHILS # BLD AUTO: 0.01 10*3/MM3 (ref 0–0.2)
BASOPHILS NFR BLD AUTO: 0.2 % (ref 0–1.5)
BH BB BLOOD EXPIRATION DATE: NORMAL
BH BB BLOOD TYPE BARCODE: 5100
BH BB DISPENSE STATUS: NORMAL
BH BB PRODUCT CODE: NORMAL
BH BB UNIT NUMBER: NORMAL
BH CV ECHO MEAS - ACS: 2.07 CM
BH CV ECHO MEAS - AO MAX PG: 9.4 MMHG
BH CV ECHO MEAS - AO MEAN PG: 4.8 MMHG
BH CV ECHO MEAS - AO ROOT DIAM: 3.2 CM
BH CV ECHO MEAS - AO V2 MAX: 153.2 CM/SEC
BH CV ECHO MEAS - AO V2 VTI: 36 CM
BH CV ECHO MEAS - AVA(I,D): 2.14 CM2
BH CV ECHO MEAS - EDV(CUBED): 120.9 ML
BH CV ECHO MEAS - EDV(MOD-SP2): 102 ML
BH CV ECHO MEAS - EDV(MOD-SP4): 96 ML
BH CV ECHO MEAS - EF(MOD-BP): 63.8 %
BH CV ECHO MEAS - EF(MOD-SP2): 64.7 %
BH CV ECHO MEAS - EF(MOD-SP4): 65.6 %
BH CV ECHO MEAS - EF_3D-VOL: 62 %
BH CV ECHO MEAS - ESV(CUBED): 31.8 ML
BH CV ECHO MEAS - ESV(MOD-SP2): 36 ML
BH CV ECHO MEAS - ESV(MOD-SP4): 33 ML
BH CV ECHO MEAS - FS: 35.9 %
BH CV ECHO MEAS - IVS/LVPW: 0.98 CM
BH CV ECHO MEAS - IVSD: 0.87 CM
BH CV ECHO MEAS - LA 3D VOL INDEX: 59
BH CV ECHO MEAS - LAT PEAK E' VEL: 12.2 CM/SEC
BH CV ECHO MEAS - LV DIASTOLIC VOL/BSA (35-75): 44.5 CM2
BH CV ECHO MEAS - LV MASS(C)D: 150.5 GRAMS
BH CV ECHO MEAS - LV MAX PG: 5.6 MMHG
BH CV ECHO MEAS - LV MEAN PG: 2.45 MMHG
BH CV ECHO MEAS - LV SYSTOLIC VOL/BSA (12-30): 15.3 CM2
BH CV ECHO MEAS - LV V1 MAX: 118.1 CM/SEC
BH CV ECHO MEAS - LV V1 VTI: 25.5 CM
BH CV ECHO MEAS - LVIDD: 4.9 CM
BH CV ECHO MEAS - LVIDS: 3.2 CM
BH CV ECHO MEAS - LVOT AREA: 3 CM2
BH CV ECHO MEAS - LVOT DIAM: 1.96 CM
BH CV ECHO MEAS - LVPWD: 0.89 CM
BH CV ECHO MEAS - MED PEAK E' VEL: 6.9 CM/SEC
BH CV ECHO MEAS - MV DEC SLOPE: 742.4 CM/SEC2
BH CV ECHO MEAS - MV DEC TIME: 0.17 MSEC
BH CV ECHO MEAS - MV E MAX VEL: 166 CM/SEC
BH CV ECHO MEAS - MV MAX PG: 14.5 MMHG
BH CV ECHO MEAS - MV MEAN PG: 3.4 MMHG
BH CV ECHO MEAS - MV P1/2T: 73.8 MSEC
BH CV ECHO MEAS - MV V2 VTI: 41.1 CM
BH CV ECHO MEAS - MVA(P1/2T): 3 CM2
BH CV ECHO MEAS - MVA(VTI): 1.87 CM2
BH CV ECHO MEAS - PA ACC TIME: 0.11 SEC
BH CV ECHO MEAS - PA PR(ACCEL): 29.1 MMHG
BH CV ECHO MEAS - PA V2 MAX: 119.1 CM/SEC
BH CV ECHO MEAS - QP/QS: 0.65
BH CV ECHO MEAS - RAP SYSTOLE: 15 MMHG
BH CV ECHO MEAS - RV MAX PG: 2.9 MMHG
BH CV ECHO MEAS - RV V1 MAX: 85.3 CM/SEC
BH CV ECHO MEAS - RV V1 VTI: 17.4 CM
BH CV ECHO MEAS - RVOT DIAM: 1.92 CM
BH CV ECHO MEAS - RVSP: 59 MMHG
BH CV ECHO MEAS - SI(MOD-SP2): 30.6 ML/M2
BH CV ECHO MEAS - SI(MOD-SP4): 29.2 ML/M2
BH CV ECHO MEAS - SUP REN AO DIAM: 2.2 CM
BH CV ECHO MEAS - SV(LVOT): 77 ML
BH CV ECHO MEAS - SV(MOD-SP2): 66 ML
BH CV ECHO MEAS - SV(MOD-SP4): 63 ML
BH CV ECHO MEAS - SV(RVOT): 50.4 ML
BH CV ECHO MEAS - TAPSE (>1.6): 1.8 CM
BH CV ECHO MEAS - TR MAX PG: 44.2 MMHG
BH CV ECHO MEAS - TR MAX VEL: 332.5 CM/SEC
BH CV ECHO MEASUREMENTS AVERAGE E/E' RATIO: 17.38
BH CV XLRA - RV BASE: 4.3 CM
BH CV XLRA - RV LENGTH: 6.5 CM
BH CV XLRA - RV MID: 3.7 CM
BH CV XLRA - TDI S': 15.6 CM/SEC
BUN SERPL-MCNC: 18 MG/DL (ref 8–23)
BUN/CREAT SERPL: 12.4 (ref 7–25)
CALCIUM SPEC-SCNC: 10.1 MG/DL (ref 8.6–10.5)
CHLORIDE SERPL-SCNC: 102 MMOL/L (ref 98–107)
CO2 SERPL-SCNC: 24.3 MMOL/L (ref 22–29)
CREAT SERPL-MCNC: 1.45 MG/DL (ref 0.57–1)
DEPRECATED RDW RBC AUTO: 42.2 FL (ref 37–54)
EGFRCR SERPLBLD CKD-EPI 2021: 38.9 ML/MIN/1.73
EOSINOPHIL # BLD AUTO: 0 10*3/MM3 (ref 0–0.4)
EOSINOPHIL NFR BLD AUTO: 0 % (ref 0.3–6.2)
ERYTHROCYTE [DISTWIDTH] IN BLOOD BY AUTOMATED COUNT: 12.8 % (ref 12.3–15.4)
GLUCOSE SERPL-MCNC: 122 MG/DL (ref 65–99)
HCT VFR BLD AUTO: 23.4 % (ref 34–46.6)
HCT VFR BLD AUTO: 31 % (ref 34–46.6)
HGB BLD-MCNC: 7.7 G/DL (ref 12–15.9)
HGB BLD-MCNC: 9.9 G/DL (ref 12–15.9)
IMM GRANULOCYTES # BLD AUTO: 0.02 10*3/MM3 (ref 0–0.05)
IMM GRANULOCYTES NFR BLD AUTO: 0.4 % (ref 0–0.5)
INR PPP: 2.25 (ref 0.9–1.1)
INR PPP: 2.25 (ref 0.9–1.1)
INR PPP: 3.6 (ref 0.9–1.1)
INR PPP: 4.41 (ref 0.9–1.1)
LEFT ATRIUM VOLUME INDEX: 47.7 ML/M2
LV EF 2D ECHO EST: 64 %
LYMPHOCYTES # BLD AUTO: 0.36 10*3/MM3 (ref 0.7–3.1)
LYMPHOCYTES NFR BLD AUTO: 6.8 % (ref 19.6–45.3)
MAGNESIUM SERPL-MCNC: 1.8 MG/DL (ref 1.6–2.4)
MAXIMAL PREDICTED HEART RATE: 150 BPM
MCH RBC QN AUTO: 29.3 PG (ref 26.6–33)
MCHC RBC AUTO-ENTMCNC: 31.9 G/DL (ref 31.5–35.7)
MCV RBC AUTO: 91.7 FL (ref 79–97)
MONOCYTES # BLD AUTO: 0.27 10*3/MM3 (ref 0.1–0.9)
MONOCYTES NFR BLD AUTO: 5.1 % (ref 5–12)
NEUTROPHILS NFR BLD AUTO: 4.66 10*3/MM3 (ref 1.7–7)
NEUTROPHILS NFR BLD AUTO: 87.5 % (ref 42.7–76)
NRBC BLD AUTO-RTO: 0 /100 WBC (ref 0–0.2)
PHOSPHATE SERPL-MCNC: 2.9 MG/DL (ref 2.5–4.5)
PLATELET # BLD AUTO: 445 10*3/MM3 (ref 140–450)
PMV BLD AUTO: 9.3 FL (ref 6–12)
POTASSIUM SERPL-SCNC: 4.1 MMOL/L (ref 3.5–5.2)
PROTHROMBIN TIME: 24.3 SECONDS (ref 11.7–14.2)
PROTHROMBIN TIME: 24.3 SECONDS (ref 11.7–14.2)
PROTHROMBIN TIME: 34.9 SECONDS (ref 11.7–14.2)
PROTHROMBIN TIME: 40.7 SECONDS (ref 11.7–14.2)
QT INTERVAL: 326 MS
QT INTERVAL: 377 MS
RBC # BLD AUTO: 3.38 10*6/MM3 (ref 3.77–5.28)
SINUS: 2.7 CM
SODIUM SERPL-SCNC: 136 MMOL/L (ref 136–145)
STJ: 2.38 CM
STRESS TARGET HR: 128 BPM
TROPONIN T SERPL-MCNC: <0.01 NG/ML (ref 0–0.03)
UNIT  ABO: NORMAL
UNIT  RH: NORMAL
WBC NRBC COR # BLD: 5.32 10*3/MM3 (ref 3.4–10.8)

## 2022-08-31 PROCEDURE — 25010000002 PROPOFOL 10 MG/ML EMULSION: Performed by: ANESTHESIOLOGY

## 2022-08-31 PROCEDURE — 85014 HEMATOCRIT: CPT | Performed by: ORTHOPAEDIC SURGERY

## 2022-08-31 PROCEDURE — 87176 TISSUE HOMOGENIZATION CULTR: CPT | Performed by: ORTHOPAEDIC SURGERY

## 2022-08-31 PROCEDURE — P9059 PLASMA, FRZ BETWEEN 8-24HOUR: HCPCS

## 2022-08-31 PROCEDURE — 93306 TTE W/DOPPLER COMPLETE: CPT

## 2022-08-31 PROCEDURE — 99222 1ST HOSP IP/OBS MODERATE 55: CPT | Performed by: INTERNAL MEDICINE

## 2022-08-31 PROCEDURE — 25010000002 HYDROMORPHONE 1 MG/ML SOLUTION: Performed by: ORTHOPAEDIC SURGERY

## 2022-08-31 PROCEDURE — 84484 ASSAY OF TROPONIN QUANT: CPT | Performed by: STUDENT IN AN ORGANIZED HEALTH CARE EDUCATION/TRAINING PROGRAM

## 2022-08-31 PROCEDURE — 25010000002 HYDROMORPHONE PER 4 MG: Performed by: NURSE ANESTHETIST, CERTIFIED REGISTERED

## 2022-08-31 PROCEDURE — 85018 HEMOGLOBIN: CPT | Performed by: ORTHOPAEDIC SURGERY

## 2022-08-31 PROCEDURE — 85025 COMPLETE CBC W/AUTO DIFF WBC: CPT | Performed by: STUDENT IN AN ORGANIZED HEALTH CARE EDUCATION/TRAINING PROGRAM

## 2022-08-31 PROCEDURE — 36430 TRANSFUSION BLD/BLD COMPNT: CPT

## 2022-08-31 PROCEDURE — 87070 CULTURE OTHR SPECIMN AEROBIC: CPT | Performed by: ORTHOPAEDIC SURGERY

## 2022-08-31 PROCEDURE — 25010000002 VANCOMYCIN 1 G RECONSTITUTED SOLUTION: Performed by: ORTHOPAEDIC SURGERY

## 2022-08-31 PROCEDURE — 93005 ELECTROCARDIOGRAM TRACING: CPT | Performed by: INTERNAL MEDICINE

## 2022-08-31 PROCEDURE — 0SBC0ZZ EXCISION OF RIGHT KNEE JOINT, OPEN APPROACH: ICD-10-PCS | Performed by: ORTHOPAEDIC SURGERY

## 2022-08-31 PROCEDURE — 25010000002 ONDANSETRON PER 1 MG: Performed by: NURSE ANESTHETIST, CERTIFIED REGISTERED

## 2022-08-31 PROCEDURE — 25010000002 FENTANYL CITRATE (PF) 50 MCG/ML SOLUTION: Performed by: NURSE ANESTHETIST, CERTIFIED REGISTERED

## 2022-08-31 PROCEDURE — 85610 PROTHROMBIN TIME: CPT | Performed by: STUDENT IN AN ORGANIZED HEALTH CARE EDUCATION/TRAINING PROGRAM

## 2022-08-31 PROCEDURE — C1713 ANCHOR/SCREW BN/BN,TIS/BN: HCPCS | Performed by: ORTHOPAEDIC SURGERY

## 2022-08-31 PROCEDURE — 0 TOBRAMYCIN PER 80 MG: Performed by: ORTHOPAEDIC SURGERY

## 2022-08-31 PROCEDURE — 85610 PROTHROMBIN TIME: CPT | Performed by: ORTHOPAEDIC SURGERY

## 2022-08-31 PROCEDURE — 25010000002 FENTANYL CITRATE (PF) 50 MCG/ML SOLUTION: Performed by: ANESTHESIOLOGY

## 2022-08-31 PROCEDURE — 99223 1ST HOSP IP/OBS HIGH 75: CPT | Performed by: INTERNAL MEDICINE

## 2022-08-31 PROCEDURE — 87075 CULTR BACTERIA EXCEPT BLOOD: CPT | Performed by: ORTHOPAEDIC SURGERY

## 2022-08-31 PROCEDURE — 93306 TTE W/DOPPLER COMPLETE: CPT | Performed by: INTERNAL MEDICINE

## 2022-08-31 PROCEDURE — 85610 PROTHROMBIN TIME: CPT | Performed by: INTERNAL MEDICINE

## 2022-08-31 PROCEDURE — 80048 BASIC METABOLIC PNL TOTAL CA: CPT | Performed by: STUDENT IN AN ORGANIZED HEALTH CARE EDUCATION/TRAINING PROGRAM

## 2022-08-31 PROCEDURE — 93010 ELECTROCARDIOGRAM REPORT: CPT | Performed by: INTERNAL MEDICINE

## 2022-08-31 PROCEDURE — 0SPC0JZ REMOVAL OF SYNTHETIC SUBSTITUTE FROM RIGHT KNEE JOINT, OPEN APPROACH: ICD-10-PCS | Performed by: ORTHOPAEDIC SURGERY

## 2022-08-31 PROCEDURE — 87205 SMEAR GRAM STAIN: CPT | Performed by: ORTHOPAEDIC SURGERY

## 2022-08-31 PROCEDURE — 73560 X-RAY EXAM OF KNEE 1 OR 2: CPT

## 2022-08-31 PROCEDURE — 0SHC08Z INSERTION OF SPACER INTO RIGHT KNEE JOINT, OPEN APPROACH: ICD-10-PCS | Performed by: ORTHOPAEDIC SURGERY

## 2022-08-31 PROCEDURE — 25010000002 PIPERACILLIN SOD-TAZOBACTAM PER 1 G: Performed by: ORTHOPAEDIC SURGERY

## 2022-08-31 PROCEDURE — 83735 ASSAY OF MAGNESIUM: CPT | Performed by: STUDENT IN AN ORGANIZED HEALTH CARE EDUCATION/TRAINING PROGRAM

## 2022-08-31 PROCEDURE — 85730 THROMBOPLASTIN TIME PARTIAL: CPT | Performed by: ORTHOPAEDIC SURGERY

## 2022-08-31 PROCEDURE — 87077 CULTURE AEROBIC IDENTIFY: CPT | Performed by: ORTHOPAEDIC SURGERY

## 2022-08-31 PROCEDURE — C1776 JOINT DEVICE (IMPLANTABLE): HCPCS | Performed by: ORTHOPAEDIC SURGERY

## 2022-08-31 PROCEDURE — 86927 PLASMA FRESH FROZEN: CPT

## 2022-08-31 PROCEDURE — 84100 ASSAY OF PHOSPHORUS: CPT | Performed by: STUDENT IN AN ORGANIZED HEALTH CARE EDUCATION/TRAINING PROGRAM

## 2022-08-31 PROCEDURE — 87186 SC STD MICRODIL/AGAR DIL: CPT | Performed by: ORTHOPAEDIC SURGERY

## 2022-08-31 DEVICE — CMT BONE PALACOS R HI/VISC 1X40: Type: IMPLANTABLE DEVICE | Site: KNEE | Status: FUNCTIONAL

## 2022-08-31 DEVICE — SPACR KN COPAL/EXCHANGE/G PREFRM 64X64MM MD: Type: IMPLANTABLE DEVICE | Site: KNEE | Status: FUNCTIONAL

## 2022-08-31 DEVICE — IMPLANTABLE DEVICE
Type: IMPLANTABLE DEVICE | Site: KNEE | Status: FUNCTIONAL
Brand: CERAMENT™BONE VOID FILLER

## 2022-08-31 RX ORDER — ONDANSETRON 2 MG/ML
4 INJECTION INTRAMUSCULAR; INTRAVENOUS ONCE AS NEEDED
Status: DISCONTINUED | OUTPATIENT
Start: 2022-08-31 | End: 2022-08-31 | Stop reason: HOSPADM

## 2022-08-31 RX ORDER — LIDOCAINE HYDROCHLORIDE 20 MG/ML
INJECTION, SOLUTION INFILTRATION; PERINEURAL AS NEEDED
Status: DISCONTINUED | OUTPATIENT
Start: 2022-08-31 | End: 2022-08-31 | Stop reason: SURG

## 2022-08-31 RX ORDER — CETIRIZINE HYDROCHLORIDE 10 MG/1
10 TABLET ORAL ONCE
Status: COMPLETED | OUTPATIENT
Start: 2022-08-31 | End: 2022-08-31

## 2022-08-31 RX ORDER — OXYCODONE AND ACETAMINOPHEN 7.5; 325 MG/1; MG/1
1 TABLET ORAL EVERY 4 HOURS PRN
Status: DISCONTINUED | OUTPATIENT
Start: 2022-08-31 | End: 2022-08-31 | Stop reason: HOSPADM

## 2022-08-31 RX ORDER — FENTANYL CITRATE 50 UG/ML
50 INJECTION, SOLUTION INTRAMUSCULAR; INTRAVENOUS
Status: DISCONTINUED | OUTPATIENT
Start: 2022-08-31 | End: 2022-08-31 | Stop reason: HOSPADM

## 2022-08-31 RX ORDER — SODIUM CHLORIDE, SODIUM LACTATE, POTASSIUM CHLORIDE, CALCIUM CHLORIDE 600; 310; 30; 20 MG/100ML; MG/100ML; MG/100ML; MG/100ML
9 INJECTION, SOLUTION INTRAVENOUS CONTINUOUS
Status: DISCONTINUED | OUTPATIENT
Start: 2022-08-31 | End: 2022-09-07 | Stop reason: HOSPADM

## 2022-08-31 RX ORDER — MIDAZOLAM HYDROCHLORIDE 1 MG/ML
0.5 INJECTION INTRAMUSCULAR; INTRAVENOUS
Status: DISCONTINUED | OUTPATIENT
Start: 2022-08-31 | End: 2022-08-31 | Stop reason: HOSPADM

## 2022-08-31 RX ORDER — MAGNESIUM HYDROXIDE 1200 MG/15ML
LIQUID ORAL AS NEEDED
Status: DISCONTINUED | OUTPATIENT
Start: 2022-08-31 | End: 2022-08-31 | Stop reason: HOSPADM

## 2022-08-31 RX ORDER — ESMOLOL HYDROCHLORIDE 10 MG/ML
INJECTION INTRAVENOUS AS NEEDED
Status: DISCONTINUED | OUTPATIENT
Start: 2022-08-31 | End: 2022-08-31 | Stop reason: SURG

## 2022-08-31 RX ORDER — LABETALOL HYDROCHLORIDE 5 MG/ML
5 INJECTION, SOLUTION INTRAVENOUS
Status: DISCONTINUED | OUTPATIENT
Start: 2022-08-31 | End: 2022-08-31 | Stop reason: HOSPADM

## 2022-08-31 RX ORDER — NALOXONE HCL 0.4 MG/ML
0.2 VIAL (ML) INJECTION AS NEEDED
Status: DISCONTINUED | OUTPATIENT
Start: 2022-08-31 | End: 2022-08-31 | Stop reason: HOSPADM

## 2022-08-31 RX ORDER — TOBRAMYCIN 1.2 G/30ML
INJECTION, POWDER, LYOPHILIZED, FOR SOLUTION INTRAVENOUS AS NEEDED
Status: DISCONTINUED | OUTPATIENT
Start: 2022-08-31 | End: 2022-08-31 | Stop reason: HOSPADM

## 2022-08-31 RX ORDER — PROMETHAZINE HYDROCHLORIDE 25 MG/1
25 SUPPOSITORY RECTAL ONCE AS NEEDED
Status: DISCONTINUED | OUTPATIENT
Start: 2022-08-31 | End: 2022-08-31 | Stop reason: HOSPADM

## 2022-08-31 RX ORDER — PROMETHAZINE HYDROCHLORIDE 25 MG/1
25 TABLET ORAL ONCE AS NEEDED
Status: DISCONTINUED | OUTPATIENT
Start: 2022-08-31 | End: 2022-08-31 | Stop reason: HOSPADM

## 2022-08-31 RX ORDER — ONDANSETRON 2 MG/ML
INJECTION INTRAMUSCULAR; INTRAVENOUS AS NEEDED
Status: DISCONTINUED | OUTPATIENT
Start: 2022-08-31 | End: 2022-08-31 | Stop reason: SURG

## 2022-08-31 RX ORDER — HYDROMORPHONE HYDROCHLORIDE 1 MG/ML
0.5 INJECTION, SOLUTION INTRAMUSCULAR; INTRAVENOUS; SUBCUTANEOUS
Status: DISCONTINUED | OUTPATIENT
Start: 2022-08-31 | End: 2022-08-31 | Stop reason: HOSPADM

## 2022-08-31 RX ORDER — SODIUM CHLORIDE 0.9 % (FLUSH) 0.9 %
3 SYRINGE (ML) INJECTION EVERY 12 HOURS SCHEDULED
Status: DISCONTINUED | OUTPATIENT
Start: 2022-08-31 | End: 2022-08-31 | Stop reason: HOSPADM

## 2022-08-31 RX ORDER — DIPHENHYDRAMINE HCL 25 MG
25 CAPSULE ORAL
Status: DISCONTINUED | OUTPATIENT
Start: 2022-08-31 | End: 2022-08-31 | Stop reason: HOSPADM

## 2022-08-31 RX ORDER — FAMOTIDINE 10 MG/ML
20 INJECTION, SOLUTION INTRAVENOUS ONCE
Status: DISCONTINUED | OUTPATIENT
Start: 2022-08-31 | End: 2022-08-31 | Stop reason: HOSPADM

## 2022-08-31 RX ORDER — FLUMAZENIL 0.1 MG/ML
0.2 INJECTION INTRAVENOUS AS NEEDED
Status: DISCONTINUED | OUTPATIENT
Start: 2022-08-31 | End: 2022-08-31 | Stop reason: HOSPADM

## 2022-08-31 RX ORDER — IBUPROFEN 600 MG/1
600 TABLET ORAL ONCE AS NEEDED
Status: DISCONTINUED | OUTPATIENT
Start: 2022-08-31 | End: 2022-08-31 | Stop reason: HOSPADM

## 2022-08-31 RX ORDER — KETAMINE HCL IN NACL, ISO-OSM 100MG/10ML
10 SYRINGE (ML) INJECTION EVERY 4 HOURS PRN
Status: DISCONTINUED | OUTPATIENT
Start: 2022-08-31 | End: 2022-08-31

## 2022-08-31 RX ORDER — PROPOFOL 10 MG/ML
VIAL (ML) INTRAVENOUS AS NEEDED
Status: DISCONTINUED | OUTPATIENT
Start: 2022-08-31 | End: 2022-08-31 | Stop reason: SURG

## 2022-08-31 RX ORDER — EPHEDRINE SULFATE 50 MG/ML
5 INJECTION, SOLUTION INTRAVENOUS ONCE AS NEEDED
Status: DISCONTINUED | OUTPATIENT
Start: 2022-08-31 | End: 2022-08-31 | Stop reason: HOSPADM

## 2022-08-31 RX ORDER — LIDOCAINE HYDROCHLORIDE 10 MG/ML
0.5 INJECTION, SOLUTION EPIDURAL; INFILTRATION; INTRACAUDAL; PERINEURAL ONCE AS NEEDED
Status: DISCONTINUED | OUTPATIENT
Start: 2022-08-31 | End: 2022-08-31 | Stop reason: HOSPADM

## 2022-08-31 RX ORDER — VANCOMYCIN HYDROCHLORIDE 1 G/20ML
INJECTION, POWDER, LYOPHILIZED, FOR SOLUTION INTRAVENOUS AS NEEDED
Status: DISCONTINUED | OUTPATIENT
Start: 2022-08-31 | End: 2022-08-31 | Stop reason: HOSPADM

## 2022-08-31 RX ORDER — ROCURONIUM BROMIDE 10 MG/ML
INJECTION, SOLUTION INTRAVENOUS AS NEEDED
Status: DISCONTINUED | OUTPATIENT
Start: 2022-08-31 | End: 2022-08-31 | Stop reason: SURG

## 2022-08-31 RX ORDER — FENTANYL CITRATE 50 UG/ML
INJECTION, SOLUTION INTRAMUSCULAR; INTRAVENOUS AS NEEDED
Status: DISCONTINUED | OUTPATIENT
Start: 2022-08-31 | End: 2022-08-31 | Stop reason: SURG

## 2022-08-31 RX ORDER — HYDRALAZINE HYDROCHLORIDE 20 MG/ML
5 INJECTION INTRAMUSCULAR; INTRAVENOUS
Status: DISCONTINUED | OUTPATIENT
Start: 2022-08-31 | End: 2022-08-31 | Stop reason: HOSPADM

## 2022-08-31 RX ORDER — SODIUM CHLORIDE 9 MG/ML
INJECTION, SOLUTION INTRAVENOUS AS NEEDED
Status: DISCONTINUED | OUTPATIENT
Start: 2022-08-31 | End: 2022-08-31 | Stop reason: HOSPADM

## 2022-08-31 RX ORDER — HYDROCODONE BITARTRATE AND ACETAMINOPHEN 7.5; 325 MG/1; MG/1
1 TABLET ORAL ONCE AS NEEDED
Status: COMPLETED | OUTPATIENT
Start: 2022-08-31 | End: 2022-08-31

## 2022-08-31 RX ORDER — SODIUM CHLORIDE 0.9 % (FLUSH) 0.9 %
3-10 SYRINGE (ML) INJECTION AS NEEDED
Status: DISCONTINUED | OUTPATIENT
Start: 2022-08-31 | End: 2022-08-31 | Stop reason: HOSPADM

## 2022-08-31 RX ORDER — DIPHENHYDRAMINE HYDROCHLORIDE 50 MG/ML
12.5 INJECTION INTRAMUSCULAR; INTRAVENOUS
Status: DISCONTINUED | OUTPATIENT
Start: 2022-08-31 | End: 2022-08-31 | Stop reason: HOSPADM

## 2022-08-31 RX ADMIN — LIDOCAINE HYDROCHLORIDE 100 MG: 20 INJECTION, SOLUTION INFILTRATION; PERINEURAL at 17:52

## 2022-08-31 RX ADMIN — FENTANYL CITRATE 50 MCG: 50 INJECTION INTRAMUSCULAR; INTRAVENOUS at 17:58

## 2022-08-31 RX ADMIN — FENTANYL CITRATE 50 MCG: 50 INJECTION INTRAMUSCULAR; INTRAVENOUS at 19:43

## 2022-08-31 RX ADMIN — ONDANSETRON 4 MG: 2 INJECTION INTRAMUSCULAR; INTRAVENOUS at 18:36

## 2022-08-31 RX ADMIN — HYDROMORPHONE HYDROCHLORIDE 0.5 MG: 1 INJECTION, SOLUTION INTRAMUSCULAR; INTRAVENOUS; SUBCUTANEOUS at 20:48

## 2022-08-31 RX ADMIN — FENTANYL CITRATE 50 MCG: 50 INJECTION INTRAMUSCULAR; INTRAVENOUS at 18:12

## 2022-08-31 RX ADMIN — MECLIZINE HYDROCHLORIDE 25 MG: 25 TABLET ORAL at 08:36

## 2022-08-31 RX ADMIN — TAZOBACTAM SODIUM AND PIPERACILLIN SODIUM 3.38 G: 375; 3 INJECTION, SOLUTION INTRAVENOUS at 15:05

## 2022-08-31 RX ADMIN — FAMOTIDINE 40 MG: 20 TABLET ORAL at 08:38

## 2022-08-31 RX ADMIN — HYDROMORPHONE HYDROCHLORIDE 0.5 MG: 1 INJECTION, SOLUTION INTRAMUSCULAR; INTRAVENOUS; SUBCUTANEOUS at 19:50

## 2022-08-31 RX ADMIN — HYDROCODONE BITARTRATE AND ACETAMINOPHEN 1 TABLET: 7.5; 325 TABLET ORAL at 20:45

## 2022-08-31 RX ADMIN — ESMOLOL HYDROCHLORIDE 30 MG: 100 INJECTION, SOLUTION INTRAVENOUS at 18:16

## 2022-08-31 RX ADMIN — LABETALOL HYDROCHLORIDE 5 MG: 5 INJECTION, SOLUTION INTRAVENOUS at 19:58

## 2022-08-31 RX ADMIN — TAZOBACTAM SODIUM AND PIPERACILLIN SODIUM 3.38 G: 375; 3 INJECTION, SOLUTION INTRAVENOUS at 05:45

## 2022-08-31 RX ADMIN — HYDROMORPHONE HYDROCHLORIDE 0.5 MG: 1 INJECTION, SOLUTION INTRAMUSCULAR; INTRAVENOUS; SUBCUTANEOUS at 19:35

## 2022-08-31 RX ADMIN — FENTANYL CITRATE 50 MCG: 50 INJECTION INTRAMUSCULAR; INTRAVENOUS at 19:12

## 2022-08-31 RX ADMIN — ESMOLOL HYDROCHLORIDE 30 MG: 100 INJECTION, SOLUTION INTRAVENOUS at 18:13

## 2022-08-31 RX ADMIN — Medication 10 ML: at 08:39

## 2022-08-31 RX ADMIN — LABETALOL HYDROCHLORIDE 5 MG: 5 INJECTION, SOLUTION INTRAVENOUS at 19:46

## 2022-08-31 RX ADMIN — HYDROCODONE BITARTRATE AND ACETAMINOPHEN 1 TABLET: 10; 325 TABLET ORAL at 00:44

## 2022-08-31 RX ADMIN — METOPROLOL TARTRATE 50 MG: 50 TABLET, FILM COATED ORAL at 08:36

## 2022-08-31 RX ADMIN — HYDROMORPHONE HYDROCHLORIDE 1 MG: 1 INJECTION, SOLUTION INTRAMUSCULAR; INTRAVENOUS; SUBCUTANEOUS at 22:29

## 2022-08-31 RX ADMIN — ESMOLOL HYDROCHLORIDE 10 MG: 100 INJECTION, SOLUTION INTRAVENOUS at 18:51

## 2022-08-31 RX ADMIN — CETIRIZINE HYDROCHLORIDE 10 MG: 10 TABLET ORAL at 13:14

## 2022-08-31 RX ADMIN — ROCURONIUM BROMIDE 45 MG: 50 INJECTION INTRAVENOUS at 17:53

## 2022-08-31 RX ADMIN — HYDRALAZINE HYDROCHLORIDE 25 MG: 25 TABLET, FILM COATED ORAL at 08:36

## 2022-08-31 RX ADMIN — HYDROCODONE BITARTRATE AND ACETAMINOPHEN 1 TABLET: 10; 325 TABLET ORAL at 08:36

## 2022-08-31 RX ADMIN — METOPROLOL TARTRATE 50 MG: 50 TABLET, FILM COATED ORAL at 22:01

## 2022-08-31 RX ADMIN — ESMOLOL HYDROCHLORIDE 10 MG: 100 INJECTION, SOLUTION INTRAVENOUS at 18:42

## 2022-08-31 RX ADMIN — SUGAMMADEX 200 MG: 100 INJECTION, SOLUTION INTRAVENOUS at 19:09

## 2022-08-31 RX ADMIN — FENTANYL CITRATE 50 MCG: 50 INJECTION INTRAMUSCULAR; INTRAVENOUS at 20:11

## 2022-08-31 RX ADMIN — LISINOPRIL: 20 TABLET ORAL at 08:36

## 2022-08-31 RX ADMIN — PROPOFOL 25 MCG/KG/MIN: 10 INJECTION, EMULSION INTRAVENOUS at 18:25

## 2022-08-31 RX ADMIN — FENTANYL CITRATE 50 MCG: 50 INJECTION INTRAMUSCULAR; INTRAVENOUS at 19:56

## 2022-08-31 RX ADMIN — ESMOLOL HYDROCHLORIDE 10 MG: 100 INJECTION, SOLUTION INTRAVENOUS at 18:31

## 2022-08-31 RX ADMIN — HYDROMORPHONE HYDROCHLORIDE 0.5 MG: 1 INJECTION, SOLUTION INTRAMUSCULAR; INTRAVENOUS; SUBCUTANEOUS at 20:33

## 2022-08-31 RX ADMIN — HYDROCODONE BITARTRATE AND ACETAMINOPHEN 1 TABLET: 10; 325 TABLET ORAL at 13:21

## 2022-08-31 RX ADMIN — HYDRALAZINE HYDROCHLORIDE 25 MG: 25 TABLET, FILM COATED ORAL at 22:01

## 2022-08-31 RX ADMIN — FENTANYL CITRATE 50 MCG: 50 INJECTION INTRAMUSCULAR; INTRAVENOUS at 18:47

## 2022-08-31 RX ADMIN — SODIUM CHLORIDE, POTASSIUM CHLORIDE, SODIUM LACTATE AND CALCIUM CHLORIDE: 600; 310; 30; 20 INJECTION, SOLUTION INTRAVENOUS at 17:45

## 2022-08-31 RX ADMIN — SODIUM CHLORIDE 100 ML/HR: 9 INJECTION, SOLUTION INTRAVENOUS at 22:03

## 2022-08-31 RX ADMIN — TAZOBACTAM SODIUM AND PIPERACILLIN SODIUM 3.38 G: 375; 3 INJECTION, SOLUTION INTRAVENOUS at 22:03

## 2022-08-31 RX ADMIN — PROPOFOL 200 MG: 10 INJECTION, EMULSION INTRAVENOUS at 17:52

## 2022-08-31 RX ADMIN — LABETALOL HYDROCHLORIDE 5 MG: 5 INJECTION, SOLUTION INTRAVENOUS at 20:16

## 2022-09-01 LAB
ANION GAP SERPL CALCULATED.3IONS-SCNC: 6.1 MMOL/L (ref 5–15)
BASOPHILS # BLD AUTO: 0.01 10*3/MM3 (ref 0–0.2)
BASOPHILS NFR BLD AUTO: 0.1 % (ref 0–1.5)
BH BB BLOOD EXPIRATION DATE: NORMAL
BH BB BLOOD EXPIRATION DATE: NORMAL
BH BB BLOOD TYPE BARCODE: 5100
BH BB BLOOD TYPE BARCODE: 5100
BH BB DISPENSE STATUS: NORMAL
BH BB DISPENSE STATUS: NORMAL
BH BB PRODUCT CODE: NORMAL
BH BB PRODUCT CODE: NORMAL
BH BB UNIT NUMBER: NORMAL
BH BB UNIT NUMBER: NORMAL
BUN SERPL-MCNC: 19 MG/DL (ref 8–23)
BUN/CREAT SERPL: 13.8 (ref 7–25)
CALCIUM SPEC-SCNC: 9.6 MG/DL (ref 8.6–10.5)
CHLORIDE SERPL-SCNC: 100 MMOL/L (ref 98–107)
CO2 SERPL-SCNC: 26.9 MMOL/L (ref 22–29)
CREAT SERPL-MCNC: 1.38 MG/DL (ref 0.57–1)
DEPRECATED RDW RBC AUTO: 40 FL (ref 37–54)
EGFRCR SERPLBLD CKD-EPI 2021: 41.3 ML/MIN/1.73
EOSINOPHIL # BLD AUTO: 0 10*3/MM3 (ref 0–0.4)
EOSINOPHIL NFR BLD AUTO: 0 % (ref 0.3–6.2)
ERYTHROCYTE [DISTWIDTH] IN BLOOD BY AUTOMATED COUNT: 12.5 % (ref 12.3–15.4)
GLUCOSE SERPL-MCNC: 146 MG/DL (ref 65–99)
HCT VFR BLD AUTO: 17.9 % (ref 34–46.6)
HCT VFR BLD AUTO: 22.9 % (ref 34–46.6)
HGB BLD-MCNC: 6 G/DL (ref 12–15.9)
HGB BLD-MCNC: 7.6 G/DL (ref 12–15.9)
IMM GRANULOCYTES # BLD AUTO: 0.08 10*3/MM3 (ref 0–0.05)
IMM GRANULOCYTES NFR BLD AUTO: 0.7 % (ref 0–0.5)
LYMPHOCYTES # BLD AUTO: 0.57 10*3/MM3 (ref 0.7–3.1)
LYMPHOCYTES NFR BLD AUTO: 5.2 % (ref 19.6–45.3)
MCH RBC QN AUTO: 29.4 PG (ref 26.6–33)
MCHC RBC AUTO-ENTMCNC: 33.5 G/DL (ref 31.5–35.7)
MCV RBC AUTO: 87.7 FL (ref 79–97)
MONOCYTES # BLD AUTO: 0.96 10*3/MM3 (ref 0.1–0.9)
MONOCYTES NFR BLD AUTO: 8.8 % (ref 5–12)
NEUTROPHILS NFR BLD AUTO: 85.2 % (ref 42.7–76)
NEUTROPHILS NFR BLD AUTO: 9.29 10*3/MM3 (ref 1.7–7)
NRBC BLD AUTO-RTO: 0 /100 WBC (ref 0–0.2)
PLATELET # BLD AUTO: 490 10*3/MM3 (ref 140–450)
PMV BLD AUTO: 9.5 FL (ref 6–12)
POTASSIUM SERPL-SCNC: 4 MMOL/L (ref 3.5–5.2)
RBC # BLD AUTO: 2.04 10*6/MM3 (ref 3.77–5.28)
SODIUM SERPL-SCNC: 133 MMOL/L (ref 136–145)
UNIT  ABO: NORMAL
UNIT  ABO: NORMAL
UNIT  RH: NORMAL
UNIT  RH: NORMAL
WBC NRBC COR # BLD: 10.91 10*3/MM3 (ref 3.4–10.8)

## 2022-09-01 PROCEDURE — 80048 BASIC METABOLIC PNL TOTAL CA: CPT | Performed by: ORTHOPAEDIC SURGERY

## 2022-09-01 PROCEDURE — 25010000002 HYDROMORPHONE 1 MG/ML SOLUTION: Performed by: ORTHOPAEDIC SURGERY

## 2022-09-01 PROCEDURE — 86900 BLOOD TYPING SEROLOGIC ABO: CPT

## 2022-09-01 PROCEDURE — 85025 COMPLETE CBC W/AUTO DIFF WBC: CPT | Performed by: ORTHOPAEDIC SURGERY

## 2022-09-01 PROCEDURE — P9016 RBC LEUKOCYTES REDUCED: HCPCS

## 2022-09-01 PROCEDURE — 99232 SBSQ HOSP IP/OBS MODERATE 35: CPT | Performed by: INTERNAL MEDICINE

## 2022-09-01 PROCEDURE — 85014 HEMATOCRIT: CPT | Performed by: STUDENT IN AN ORGANIZED HEALTH CARE EDUCATION/TRAINING PROGRAM

## 2022-09-01 PROCEDURE — 25010000002 PIPERACILLIN SOD-TAZOBACTAM PER 1 G: Performed by: ORTHOPAEDIC SURGERY

## 2022-09-01 PROCEDURE — 86901 BLOOD TYPING SEROLOGIC RH(D): CPT

## 2022-09-01 PROCEDURE — 85018 HEMOGLOBIN: CPT | Performed by: STUDENT IN AN ORGANIZED HEALTH CARE EDUCATION/TRAINING PROGRAM

## 2022-09-01 PROCEDURE — 36430 TRANSFUSION BLD/BLD COMPNT: CPT

## 2022-09-01 RX ORDER — NALOXONE HCL 0.4 MG/ML
0.4 VIAL (ML) INJECTION
Status: DISCONTINUED | OUTPATIENT
Start: 2022-09-01 | End: 2022-09-07 | Stop reason: HOSPADM

## 2022-09-01 RX ADMIN — HYDRALAZINE HYDROCHLORIDE 25 MG: 25 TABLET, FILM COATED ORAL at 09:29

## 2022-09-01 RX ADMIN — HYDROCODONE BITARTRATE AND ACETAMINOPHEN 2 TABLET: 10; 325 TABLET ORAL at 13:01

## 2022-09-01 RX ADMIN — TAZOBACTAM SODIUM AND PIPERACILLIN SODIUM 3.38 G: 375; 3 INJECTION, SOLUTION INTRAVENOUS at 05:39

## 2022-09-01 RX ADMIN — Medication 10 ML: at 20:05

## 2022-09-01 RX ADMIN — TAZOBACTAM SODIUM AND PIPERACILLIN SODIUM 3.38 G: 375; 3 INJECTION, SOLUTION INTRAVENOUS at 21:58

## 2022-09-01 RX ADMIN — HYDROCODONE BITARTRATE AND ACETAMINOPHEN 1 TABLET: 10; 325 TABLET ORAL at 01:07

## 2022-09-01 RX ADMIN — MECLIZINE HYDROCHLORIDE 25 MG: 25 TABLET ORAL at 09:29

## 2022-09-01 RX ADMIN — LISINOPRIL: 20 TABLET ORAL at 09:29

## 2022-09-01 RX ADMIN — HYDROMORPHONE HYDROCHLORIDE 1 MG: 1 INJECTION, SOLUTION INTRAMUSCULAR; INTRAVENOUS; SUBCUTANEOUS at 22:07

## 2022-09-01 RX ADMIN — HYDROCODONE BITARTRATE AND ACETAMINOPHEN 2 TABLET: 10; 325 TABLET ORAL at 05:57

## 2022-09-01 RX ADMIN — HYDROMORPHONE HYDROCHLORIDE 1 MG: 1 INJECTION, SOLUTION INTRAMUSCULAR; INTRAVENOUS; SUBCUTANEOUS at 09:30

## 2022-09-01 RX ADMIN — METOPROLOL TARTRATE 50 MG: 50 TABLET, FILM COATED ORAL at 09:29

## 2022-09-01 RX ADMIN — HYDRALAZINE HYDROCHLORIDE 25 MG: 25 TABLET, FILM COATED ORAL at 20:01

## 2022-09-01 RX ADMIN — HYDROCODONE BITARTRATE AND ACETAMINOPHEN 2 TABLET: 10; 325 TABLET ORAL at 20:00

## 2022-09-01 RX ADMIN — HYDROMORPHONE HYDROCHLORIDE 1 MG: 1 INJECTION, SOLUTION INTRAMUSCULAR; INTRAVENOUS; SUBCUTANEOUS at 02:23

## 2022-09-01 RX ADMIN — METOPROLOL TARTRATE 50 MG: 50 TABLET, FILM COATED ORAL at 20:01

## 2022-09-01 RX ADMIN — TAZOBACTAM SODIUM AND PIPERACILLIN SODIUM 3.38 G: 375; 3 INJECTION, SOLUTION INTRAVENOUS at 15:34

## 2022-09-01 RX ADMIN — Medication 10 ML: at 09:32

## 2022-09-01 RX ADMIN — FAMOTIDINE 40 MG: 20 TABLET ORAL at 09:29

## 2022-09-01 NOTE — OP NOTE
Total Knee Explant with Antibiotic Spacer   Operative Note  Dr. SHIRA Hammonds II  [phone]    PATIENT NAME: Fe Arevalo  MRN: [id number]  : 1950 AGE: 72 y.o. GENDER: female  DATE OF OPERATION: 2022  PREOPERATIVE DIAGNOSIS: Total Knee Infection  POSTOPERATIVE DIAGNOSIS: Same  OPERATION PERFORMED: Right Total Knee Arthroplasty Explantation with Placement of Antibiotic Spacer   SURGEON: Nacho Hammonds MD  Circulator: Wendy Isaacs RN  Scrub Person: Kerry Song Liliana G  Assistant: Dawn Ceron PA  ANESTHESIA: General  ASSISTANT: CHAYA Long. This case would not have been possible without another set of skilled surgical hands for retraction, use of instrumentation, and general assistance.  This assistance was vital to the success of the case.   ESTIMATED BLOOD LOSS: 300cc  SPONGE AND NEEDLE COUNT: Correct  INDICATIONS: This patient was noted to have an infected total knee requiring explantation and an antibiotic spacer to maximize the chance of infection eradication. A discussion of operative versus nonoperative treatment was had with the patient and they failed conservative management. They elected to undergo the procedure. The risks of surgery were discussed and included the risk of anesthesia, continued infection, damage to neurovascular structures, implant loosening/failure, fracture, hardware prominence, continued pain, early failure, the need for further procedures, medical complications, and others. No guarantees were made. The patient wished to proceed with surgery and a surgical consent was signed.    COMPONENTS:   · Knee: [employer] MEDIUM with 2 packs cement and vancomycin powder With tobramycin powder  ·  antibiotic beads with vancomycin    CPT For Billin removal of prosthesis of knee with placement of spacer      DETAILS OF PROCEDURE:  The patient was met in the preoperative area. The site was marked. The consent and H&P were  reviewed. The patient was then wheeled back to the operative suite and transferred to the operative table. The patient underwent anesthesia. A tourniquet was placed on the upper thigh.  A bump was placed under the operative hip. The Vazquez baseplate was secured to the table. Surgical alcohol was used to thoroughly clean the entire operative extremity.     The leg was then prepped in the normal sterile fashion, which included Chloroprep, multiple layers of sterile drapes, and surgical space suits for the entire operative team. The Vazquez boot was applied to the foot after adequate padding. An Ioban dressing was applied to the knee after the surgical incision was marked.  New outer gloves were used by all sterile surgical team members after final draping. After a surgical timeout in which administration of preoperative antibiotics as well as 1g of tranexamic acid (if not contraindicated) and the surgical site were confirmed, the tourniquet was put up.     In flexion, a midline knee incision was utilized through the old incision from the prior surgery. Dissection was carried down to the capsule. Medial and lateral flaps were created to allow adequate exposure. Next a medial parapatellar arthrotomy was made.  A complete synovectomy was performed to gain better access to the knee. Excess tissue about the patella was removed. The patella was mobilized adequately to allow access to the polyethylene insert. The polyethylene insert was then extracted.    Excess scar tissue around the patella component was excised and the patellar component was carefully extracted using a saw and rongeur, being careful to leave as much bone as possible.      Next the distal femoral component was removed using osteotomes to preserve the maximum amount of bone and ensuring all cement was removed after the implant was extracted. Next the tibial component was removed again using osteotomes to preserve the maximum amount of bone and ensuring  "all cement was removed after the implant was extracted.     The knee was then irrigated and debrided thoroughly using a rongeur and curette with 3 L of saline and Betadine via cystoscopy tubing.    Heraeus Knee: The knee was then sized for the spacer and the real component was opened.  2 packs of cement were opened along with vancomycin powder.  This was all hand mixed on the back table and after it was doughy enough, the knee spacer was implanted beginning with the tibia and then the femur.  I tried to leave as much antibiotic cement in the knee as possible while maintaining reasonable alignment and stability of the knee to allow for as much functional range of motion as possible.    The knee tissues were then closed in layers.  A sterile dressing was applied    The patient was awoken from anesthesia, moved to the [facility] and taken to the recovery room in stable condition. Sponge and needle count were correct. There were no complications. Patient tolerated the procedure well.    R \"Stevan\" Cassius DAVIES MD  Orthopaedic Surgery  [facility]  [phone]                    "

## 2022-09-01 NOTE — PROGRESS NOTES
"CC: Excessive atrial fibrillation with rapid ventricular response    Interval History: No new acute events overnight      Vital Signs  Temp:  [97.4 °F (36.3 °C)-98.1 °F (36.7 °C)] 97.8 °F (36.6 °C)  Heart Rate:  [] 96  Resp:  [15-18] 16  BP: (117-162)/(52-93) 138/72    Intake/Output Summary (Last 24 hours) at 9/1/2022 1111  Last data filed at 9/1/2022 0625  Gross per 24 hour   Intake 2267 ml   Output 100 ml   Net 2167 ml     Flowsheet Rows    Flowsheet Row First Filed Value   Admission Height 175.3 cm (69\") Documented at 08/30/2022 2317   Admission Weight 101 kg (222 lb) Documented at 08/30/2022 1455          PHYSICAL EXAM:  General: No acute distress  Resp:NL Rate, symmetric chest expansion,unlabored, clear  CV: Irregularly irregular, NL PMI, NL S1 and S2, no Murmur, no gallop, no rub, No JVD.   ABD:Nl sounds, no masses or tenderness, nondistended, no guarding or rebound  Neuro: alert,cooperative and oriented  Extr: Wrapped and dressed right lower extremity      Results Review:    Results from last 7 days   Lab Units 09/01/22  0551   SODIUM mmol/L 133*   POTASSIUM mmol/L 4.0   CHLORIDE mmol/L 100   CO2 mmol/L 26.9   BUN mg/dL 19   CREATININE mg/dL 1.38*   GLUCOSE mg/dL 146*   CALCIUM mg/dL 9.6     Results from last 7 days   Lab Units 08/31/22  0545 08/30/22 2246   TROPONIN T ng/mL <0.010 <0.010     Results from last 7 days   Lab Units 09/01/22  0551   WBC 10*3/mm3 10.91*   HEMOGLOBIN g/dL 6.0*   HEMATOCRIT % 17.9*   PLATELETS 10*3/mm3 490*     Results from last 7 days   Lab Units 08/31/22  1608 08/31/22  0545 08/30/22  2246 08/30/22  1630   INR  2.25*  2.25* 3.60* 4.41* 5.76*   APTT seconds  --  63.3*  --  74.9*         Results from last 7 days   Lab Units 08/31/22  1608   MAGNESIUM mg/dL 1.8         I reviewed the patient's new clinical results.  I personally viewed and interpreted the patient's EKG/Telemetry data        Medication Review:   Meds reviewed    lactated ringers, 9 mL/hr  sodium chloride, 100 " mL/hr, Last Rate: 100 mL/hr (08/31/22 2203)        Assessment/Plan    1.  Paroxysmal atrial fibrillation with rapid ventricular response- prior history of A. fib ablation, surgical DANISHA exclusion on chronic anticoagulation  Went into A. fib likely while receiving vitamin K infusion that was discontinued.  2.  Supratherapeutic INR-received FFP  3.  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  Anemia with drop of hemoglobin from 9.9 to 6.0 following surgery likely blood loss.  Getting 2 units of packed RBC.  5.  Essential hypertension that is fairly controlled but monitor for hypotension.  6.  Pulmonary hypertension-moderate to severe-not new.  No evidence for heart failure.    Patient had successful surgery.  Remains in atrial fibrillation status rate controlled.  Resume anticoagulation when okay with orthopedics.    Garrett Franco MD  09/01/22  11:11 EDT

## 2022-09-01 NOTE — SIGNIFICANT NOTE
09/01/22 1509   OTHER   Discipline physical therapist   Rehab Time/Intention   Session Not Performed other (see comments)  (pt w hgb of 6.0, getting transfusion today, discussed w RN, will follow up tomorrow.)   Recommendation   PT - Next Appointment 09/02/22

## 2022-09-01 NOTE — PLAN OF CARE
Problem: Adult Inpatient Plan of Care  Goal: Plan of Care Review  Outcome: Ongoing, Progressing  Flowsheets (Taken 9/1/2022 1445)  Progress: improving  Plan of Care Reviewed With: patient  Outcome Evaluation: Patient has been pleasant and cooperative during shift. Patient treated for pain. No nasuea or SOA. AOx4, room air, assist x1. 2U PRBCs, H&H redraw. IVFs infusing and abx. Will continue to monitor and assist patient as needed.  Goal: Patient-Specific Goal (Individualized)  Outcome: Ongoing, Progressing  Goal: Absence of Hospital-Acquired Illness or Injury  Outcome: Ongoing, Progressing  Intervention: Identify and Manage Fall Risk  Recent Flowsheet Documentation  Taken 9/1/2022 1345 by Mich Nolan RN  Safety Promotion/Fall Prevention:   assistive device/personal items within reach   fall prevention program maintained   nonskid shoes/slippers when out of bed   safety round/check completed  Taken 9/1/2022 1200 by Mich Nolan RN  Safety Promotion/Fall Prevention:   assistive device/personal items within reach   fall prevention program maintained   nonskid shoes/slippers when out of bed   safety round/check completed  Taken 9/1/2022 1000 by Mich Nolan RN  Safety Promotion/Fall Prevention:   assistive device/personal items within reach   fall prevention program maintained   nonskid shoes/slippers when out of bed   safety round/check completed  Taken 9/1/2022 0933 by Mich Nolan RN  Safety Promotion/Fall Prevention:   assistive device/personal items within reach   fall prevention program maintained   nonskid shoes/slippers when out of bed   safety round/check completed  Intervention: Prevent Skin Injury  Recent Flowsheet Documentation  Taken 9/1/2022 1345 by Mich Nolan RN  Body Position: supine  Skin Protection: tubing/devices free from skin contact  Taken 9/1/2022 1200 by Mich Nolan RN  Body Position: supine  Taken 9/1/2022 1000 by Mich Nolan RN  Body Position:  supine  Taken 9/1/2022 0933 by Mich Nolan RN  Body Position: supine  Skin Protection:   tubing/devices free from skin contact   incontinence pads utilized  Intervention: Prevent and Manage VTE (Venous Thromboembolism) Risk  Recent Flowsheet Documentation  Taken 9/1/2022 1345 by Mich Nolan RN  Activity Management: activity adjusted per tolerance  Taken 9/1/2022 1200 by Mich Nolan RN  Activity Management: activity adjusted per tolerance  Taken 9/1/2022 1000 by Mich Nolan RN  Activity Management: activity adjusted per tolerance  Taken 9/1/2022 0933 by Mich Nolan RN  Activity Management: activity adjusted per tolerance  Goal: Optimal Comfort and Wellbeing  Outcome: Ongoing, Progressing  Goal: Readiness for Transition of Care  Outcome: Ongoing, Progressing     Problem: Fall Injury Risk  Goal: Absence of Fall and Fall-Related Injury  Outcome: Ongoing, Progressing  Intervention: Promote Injury-Free Environment  Recent Flowsheet Documentation  Taken 9/1/2022 1345 by Mich Nolan RN  Safety Promotion/Fall Prevention:   assistive device/personal items within reach   fall prevention program maintained   nonskid shoes/slippers when out of bed   safety round/check completed  Taken 9/1/2022 1200 by Mich Nolan RN  Safety Promotion/Fall Prevention:   assistive device/personal items within reach   fall prevention program maintained   nonskid shoes/slippers when out of bed   safety round/check completed  Taken 9/1/2022 1000 by Mich Nolan RN  Safety Promotion/Fall Prevention:   assistive device/personal items within reach   fall prevention program maintained   nonskid shoes/slippers when out of bed   safety round/check completed  Taken 9/1/2022 0933 by Mich Nolan RN  Safety Promotion/Fall Prevention:   assistive device/personal items within reach   fall prevention program maintained   nonskid shoes/slippers when out of bed   safety round/check completed     Problem:  Hypertension Comorbidity  Goal: Blood Pressure in Desired Range  Outcome: Ongoing, Progressing     Problem: Skin Injury Risk Increased  Goal: Skin Health and Integrity  Outcome: Ongoing, Progressing  Intervention: Optimize Skin Protection  Recent Flowsheet Documentation  Taken 9/1/2022 1345 by Mich Nolan RN  Pressure Reduction Techniques:   frequent weight shift encouraged   weight shift assistance provided  Head of Bed (HOB) Positioning: HOB at 30 degrees  Pressure Reduction Devices: pressure-redistributing mattress utilized  Skin Protection: tubing/devices free from skin contact  Taken 9/1/2022 1200 by Mich Nolan RN  Head of Bed (HOB) Positioning: HOB at 30-45 degrees  Taken 9/1/2022 1000 by Mich Nolan RN  Head of Bed (HOB) Positioning: HOB at 20 degrees  Taken 9/1/2022 0933 by Mich Nolan RN  Pressure Reduction Techniques:   frequent weight shift encouraged   weight shift assistance provided  Head of Bed (HOB) Positioning: HOB at 20 degrees  Pressure Reduction Devices: pressure-redistributing mattress utilized  Skin Protection:   tubing/devices free from skin contact   incontinence pads utilized   Goal Outcome Evaluation:  Plan of Care Reviewed With: patient        Progress: improving  Outcome Evaluation: Patient has been pleasant and cooperative during shift. Patient treated for pain. No nasuea or SOA. AOx4, room air, assist x1. 2U PRBCs, H&H redraw. IVFs infusing and abx. Will continue to monitor and assist patient as needed.

## 2022-09-01 NOTE — ANESTHESIA POSTPROCEDURE EVALUATION
"Patient: Fe Arevalo    Procedure Summary     Date: 08/31/22 Room / Location: [facility] OR  / [facility] MAIN OR    Anesthesia Start: 1745 Anesthesia Stop: 1933    Procedure: RIGHT KNEE IMPLANT ANTIBIOTIC SPACER (Right Knee) Diagnosis:     Surgeons: Nacho Hammonds II, MD Provider: Carlos Zuñiga MD    Anesthesia Type: general ASA Status: 3          Anesthesia Type: general    Vitals  Vitals Value Taken Time   /75 08/31/22 2116   Temp 36.7 °C (98.1 °F) 08/31/22 2100   Pulse 114 08/31/22 2118   Resp 15 08/31/22 1926   SpO2 96 % 08/31/22 2119   Vitals shown include unvalidated device data.        Post Anesthesia Care and Evaluation    Pain management: adequate    Airway patency: patent  Anesthetic complications: No anesthetic complications    Cardiovascular status: acceptable  Respiratory status: acceptable  Hydration status: acceptable    Comments: /69   Pulse 91   Temp 36.4 °C (97.5 °F) (Oral)   Resp 16   Ht 175.3 cm (69\")   Wt 101 kg (222 lb)   SpO2 98%   BMI 32.78 kg/m²         "

## 2022-09-01 NOTE — PLAN OF CARE
Goal Outcome Evaluation:      Patient resting on bed , VSS, Afib on tele , C/O pain medicated as ordered,No sign of acute distress. Fall prevention maintained.

## 2022-09-02 LAB
ANION GAP SERPL CALCULATED.3IONS-SCNC: 8.9 MMOL/L (ref 5–15)
BASOPHILS # BLD AUTO: 0.02 10*3/MM3 (ref 0–0.2)
BASOPHILS NFR BLD AUTO: 0.2 % (ref 0–1.5)
BH BB BLOOD EXPIRATION DATE: NORMAL
BH BB BLOOD EXPIRATION DATE: NORMAL
BH BB BLOOD TYPE BARCODE: 9500
BH BB BLOOD TYPE BARCODE: 9500
BH BB DISPENSE STATUS: NORMAL
BH BB DISPENSE STATUS: NORMAL
BH BB PRODUCT CODE: NORMAL
BH BB PRODUCT CODE: NORMAL
BH BB UNIT NUMBER: NORMAL
BH BB UNIT NUMBER: NORMAL
BUN SERPL-MCNC: 15 MG/DL (ref 8–23)
BUN/CREAT SERPL: 11.7 (ref 7–25)
CALCIUM SPEC-SCNC: 9.4 MG/DL (ref 8.6–10.5)
CHLORIDE SERPL-SCNC: 101 MMOL/L (ref 98–107)
CO2 SERPL-SCNC: 24.1 MMOL/L (ref 22–29)
CREAT SERPL-MCNC: 1.28 MG/DL (ref 0.57–1)
CROSSMATCH INTERPRETATION: NORMAL
CROSSMATCH INTERPRETATION: NORMAL
DEPRECATED RDW RBC AUTO: 50.2 FL (ref 37–54)
EGFRCR SERPLBLD CKD-EPI 2021: 45.2 ML/MIN/1.73
EOSINOPHIL # BLD AUTO: 0.12 10*3/MM3 (ref 0–0.4)
EOSINOPHIL NFR BLD AUTO: 1.2 % (ref 0.3–6.2)
ERYTHROCYTE [DISTWIDTH] IN BLOOD BY AUTOMATED COUNT: 15.2 % (ref 12.3–15.4)
GLUCOSE SERPL-MCNC: 154 MG/DL (ref 65–99)
HCT VFR BLD AUTO: 22.5 % (ref 34–46.6)
HGB BLD-MCNC: 7.1 G/DL (ref 12–15.9)
IMM GRANULOCYTES # BLD AUTO: 0.1 10*3/MM3 (ref 0–0.05)
IMM GRANULOCYTES NFR BLD AUTO: 1 % (ref 0–0.5)
LYMPHOCYTES # BLD AUTO: 0.65 10*3/MM3 (ref 0.7–3.1)
LYMPHOCYTES NFR BLD AUTO: 6.4 % (ref 19.6–45.3)
MCH RBC QN AUTO: 28.9 PG (ref 26.6–33)
MCHC RBC AUTO-ENTMCNC: 31.6 G/DL (ref 31.5–35.7)
MCV RBC AUTO: 91.5 FL (ref 79–97)
MONOCYTES # BLD AUTO: 0.97 10*3/MM3 (ref 0.1–0.9)
MONOCYTES NFR BLD AUTO: 9.5 % (ref 5–12)
NEUTROPHILS NFR BLD AUTO: 8.34 10*3/MM3 (ref 1.7–7)
NEUTROPHILS NFR BLD AUTO: 81.7 % (ref 42.7–76)
NRBC BLD AUTO-RTO: 0 /100 WBC (ref 0–0.2)
PLATELET # BLD AUTO: 377 10*3/MM3 (ref 140–450)
PMV BLD AUTO: 9.6 FL (ref 6–12)
POTASSIUM SERPL-SCNC: 4.1 MMOL/L (ref 3.5–5.2)
RBC # BLD AUTO: 2.46 10*6/MM3 (ref 3.77–5.28)
SODIUM SERPL-SCNC: 134 MMOL/L (ref 136–145)
UNIT  ABO: NORMAL
UNIT  ABO: NORMAL
UNIT  RH: NORMAL
UNIT  RH: NORMAL
WBC NRBC COR # BLD: 10.2 10*3/MM3 (ref 3.4–10.8)

## 2022-09-02 PROCEDURE — 25010000002 HYDROMORPHONE 1 MG/ML SOLUTION: Performed by: ORTHOPAEDIC SURGERY

## 2022-09-02 PROCEDURE — 85025 COMPLETE CBC W/AUTO DIFF WBC: CPT | Performed by: ORTHOPAEDIC SURGERY

## 2022-09-02 PROCEDURE — 97110 THERAPEUTIC EXERCISES: CPT

## 2022-09-02 PROCEDURE — 25010000002 PIPERACILLIN SOD-TAZOBACTAM PER 1 G: Performed by: ORTHOPAEDIC SURGERY

## 2022-09-02 PROCEDURE — 97162 PT EVAL MOD COMPLEX 30 MIN: CPT

## 2022-09-02 PROCEDURE — 80048 BASIC METABOLIC PNL TOTAL CA: CPT | Performed by: ORTHOPAEDIC SURGERY

## 2022-09-02 PROCEDURE — 99232 SBSQ HOSP IP/OBS MODERATE 35: CPT | Performed by: INTERNAL MEDICINE

## 2022-09-02 PROCEDURE — 99231 SBSQ HOSP IP/OBS SF/LOW 25: CPT | Performed by: NURSE PRACTITIONER

## 2022-09-02 RX ADMIN — TAZOBACTAM SODIUM AND PIPERACILLIN SODIUM 3.38 G: 375; 3 INJECTION, SOLUTION INTRAVENOUS at 05:39

## 2022-09-02 RX ADMIN — MECLIZINE HYDROCHLORIDE 25 MG: 25 TABLET ORAL at 07:57

## 2022-09-02 RX ADMIN — SODIUM CHLORIDE 100 ML/HR: 9 INJECTION, SOLUTION INTRAVENOUS at 03:02

## 2022-09-02 RX ADMIN — HYDRALAZINE HYDROCHLORIDE 25 MG: 25 TABLET, FILM COATED ORAL at 07:57

## 2022-09-02 RX ADMIN — HYDROMORPHONE HYDROCHLORIDE 1 MG: 1 INJECTION, SOLUTION INTRAMUSCULAR; INTRAVENOUS; SUBCUTANEOUS at 15:58

## 2022-09-02 RX ADMIN — Medication 10 ML: at 21:49

## 2022-09-02 RX ADMIN — HYDROCODONE BITARTRATE AND ACETAMINOPHEN 2 TABLET: 10; 325 TABLET ORAL at 12:54

## 2022-09-02 RX ADMIN — HYDROMORPHONE HYDROCHLORIDE 1 MG: 1 INJECTION, SOLUTION INTRAMUSCULAR; INTRAVENOUS; SUBCUTANEOUS at 10:59

## 2022-09-02 RX ADMIN — HYDROCODONE BITARTRATE AND ACETAMINOPHEN 2 TABLET: 10; 325 TABLET ORAL at 18:44

## 2022-09-02 RX ADMIN — HYDROMORPHONE HYDROCHLORIDE 1 MG: 1 INJECTION, SOLUTION INTRAMUSCULAR; INTRAVENOUS; SUBCUTANEOUS at 20:33

## 2022-09-02 RX ADMIN — Medication 10 ML: at 07:57

## 2022-09-02 RX ADMIN — HYDRALAZINE HYDROCHLORIDE 25 MG: 25 TABLET, FILM COATED ORAL at 20:33

## 2022-09-02 RX ADMIN — TAZOBACTAM SODIUM AND PIPERACILLIN SODIUM 3.38 G: 375; 3 INJECTION, SOLUTION INTRAVENOUS at 22:49

## 2022-09-02 RX ADMIN — TAZOBACTAM SODIUM AND PIPERACILLIN SODIUM 3.38 G: 375; 3 INJECTION, SOLUTION INTRAVENOUS at 12:49

## 2022-09-02 RX ADMIN — LISINOPRIL: 20 TABLET ORAL at 10:59

## 2022-09-02 RX ADMIN — FAMOTIDINE 40 MG: 20 TABLET ORAL at 07:57

## 2022-09-02 RX ADMIN — HYDROCODONE BITARTRATE AND ACETAMINOPHEN 2 TABLET: 10; 325 TABLET ORAL at 22:49

## 2022-09-02 RX ADMIN — METOPROLOL TARTRATE 50 MG: 50 TABLET, FILM COATED ORAL at 07:57

## 2022-09-02 RX ADMIN — METOPROLOL TARTRATE 50 MG: 50 TABLET, FILM COATED ORAL at 20:32

## 2022-09-02 RX ADMIN — HYDROMORPHONE HYDROCHLORIDE 1 MG: 1 INJECTION, SOLUTION INTRAMUSCULAR; INTRAVENOUS; SUBCUTANEOUS at 04:51

## 2022-09-02 RX ADMIN — HYDROCODONE BITARTRATE AND ACETAMINOPHEN 2 TABLET: 10; 325 TABLET ORAL at 07:51

## 2022-09-02 NOTE — PLAN OF CARE
Goal Outcome Evaluation:  Plan of Care Reviewed With: patient, spouse        Progress: improving  Outcome Evaluation: Pt seen for PT eval this am. She is s/p R knee spacer implant and presents w increased post op pain, weakness, decreased activity tolerance, and overall decreased functional mobility. Pt states this is 3rd knee surgery. She is TTWB to RLE. Pt limited today due to increased pain. She was able to sit EOB w therapist fully supporting RLE. She was not able to allow RLE be lowered to floor due to pain. She tolerated sitting for several minutes and became tearful due to pain. Pt assisted back to bed w RLE elevated. [facility] and will continue to benefit from skilled PT to maximize safety, strength, and independence w mobility.

## 2022-09-02 NOTE — PROGRESS NOTES
"Dressings remain CDI. Continues with IV abx. Dressing changes to begin Sunday. Drain to remain until it stops working. I gave her encouragement to work with PT to get oob and to the chair. Hopefully she will tolerate that today    R \"Stevan\" Cassius DAVIES MD  Orthopaedic Surgery  [facility]  [phone] - [place] Office  [phone] - [place] Office    "

## 2022-09-02 NOTE — THERAPY EVALUATION
Patient Name: Fe Arevalo  : 1950    MRN: [id number]                              Today's Date: 2022       Admit Date: 2022    Visit Dx:     ICD-10-CM ICD-9-CM   1. Infection of total knee replacement (HCC)  T84.59XA 996.66    Z96.659 V43.65     Patient Active Problem List   Diagnosis   • Arthritis of ankle   • Wound dehiscence   • Pain and swelling of ankle   • Status post knee replacement   • Status post knee replacement, unspecified laterality   • Infected prosthetic knee joint (HCC)   • Chronic a-fib (HCC)   • Hypertension   • CKD (chronic kidney disease) stage 3, GFR 30-59 ml/min (HCC)   • Chronic anticoagulation   • Abnormal EKG   • Supratherapeutic INR   • Allergic reaction caused by a drug     Past Medical History:   Diagnosis Date   • A-fib (HCC)    • Histoplasmosis     STATES BILAT EYES:  INJECTIONS INTO HER EYES BILAT EVERY 7 WEEKS.  DENIES VISION LOSS   • History of blood clots 2021    PT STATES WAS DIAGNOSED WITH BLOOD CLOT BEHIND HER HEART AUG 2021-PT STATES THIS IS RESOLVED AND HAD CARDIAC ABLATION 2022   • Hypertension    • Lab test positive for detection of COVID-19 virus 2022   • Osteoarthritis of right knee    • Osteomyelitis (HCC)     MRSA RIGHT FOOT   • PONV (postoperative nausea and vomiting)    • Right knee pain     WEAKNESS AND LIMITED MOBILITY     Past Surgical History:   Procedure Laterality Date   • CARDIAC ABLATION  04/29/2022    X2.  PT HAS ATRICLIP DANISHA EXCLUSION SYSTEM IN PLACE THAT PT STATES IS NOT WORKING   • CHOLECYSTECTOMY     • COLONOSCOPY     • FOOT SURGERY Right     x7 DT OSTEOMYELITIS   • HYSTERECTOMY     • KNEE MINI REVISION Right 2022    Procedure: KNEE POLY INSERT EXCHANGE;  Surgeon: Nacho Hammonds II, MD;  Location: [facility] MAIN OR;  Service: Orthopedics;  Laterality: Right;   • KNEE MINI REVISION Right 2022    Procedure: RIGHT KNEE IMPLANT ANTIBIOTIC SPACER;  Surgeon: Nacho Hammonds II, MD;  Location:   [facility];  Service: Orthopedics;  Laterality: Right;   • TOTAL KNEE ARTHROPLASTY Right 7/15/2022    Procedure: TOTAL KNEE ARTHROPLASTY WITH ROBOT;  Surgeon: Nacho Hammonds II, MD;  Location:  [facility] OR [facility];  Service: Robotics - Ortho;  Laterality: Right;   •  SHUNT INSERTION        General Information     Row Name 09/02/22 1208          Physical Therapy Time and Intention    Document Type evaluation  -EJ     Mode of Treatment physical therapy  -EJ     Row Name 09/02/22 1208          General Information    Patient Profile Reviewed yes  -EJ     Prior Level of Function independent:;min assist:;ADL's;all household mobility  ambulating w walker  -EJ     Existing Precautions/Restrictions fall;non-weight bearing   TTWB RLE  -EJ     Barriers to Rehab medically complex  -EJ     Row Name 09/02/22 1208          Living Environment    People in Home spouse  -EJ     Row Name 09/02/22 1208          Cognition    Orientation Status (Cognition) oriented x 3  -EJ     Row Name 09/02/22 1208          Safety Issues, Functional Mobility    Impairments Affecting Function (Mobility) pain;strength;endurance/activity tolerance;range of motion (ROM)  -EJ           User Key  (r) = Recorded By, (t) = Taken By, (c) = Cosigned By    Initials Name Provider Type    EJ Palmira Torres, PT Physical Therapist               Mobility     Row Name 09/02/22 1211          Bed Mobility    Bed Mobility supine-sit;sit-supine  -EJ     Supine-Sit [place] (Bed Mobility) verbal cues;nonverbal cues (demo/gesture);moderate assist (50% patient effort)  -EJ     Sit-Supine [place] (Bed Mobility) verbal cues;nonverbal cues (demo/gesture);moderate assist (50% patient effort)  -EJ     Assistive Device (Bed Mobility) head of bed elevated;bed rails  -EJ     Comment, (Bed Mobility) therapist supporting RLE throughout mobility, pt had difficulty scooting more toward EOB due to increased pain.  -EJ     Row Name 09/02/22 1211          Transfers     Comment, (Transfers) not appropriate to assess  -EJ     Row Name 09/02/22 1211          Mobility    Extremity Weight-bearing Status right lower extremity  -EJ     Right Lower Extremity (Weight-bearing Status) toe touch weight-bearing (TTWB)   -EJ           User Key  (r) = Recorded By, (t) = Taken By, (c) = Cosigned By    Initials Name Provider Type    Palmira Bautista, PT Physical Therapist               Obj/Interventions     Row Name 09/02/22 1213          Range of Motion Comprehensive    General Range of Motion no range of motion deficits identified  -EJ     Comment, General Range of Motion x RLE  -EJ     [facility] Name 09/02/22 1213          Strength Comprehensive (MMT)    Comment, General Manual Muscle Testing (MMT) Assessment generalized weakness, increased RLE weakness  -EJ     [facility] Name 09/02/22 1213          Balance    Balance Assessment sitting static balance  -EJ     Static Sitting Balance minimal assist  -EJ     Position, Sitting Balance sitting edge of bed  -EJ     Comment, Balance therapist supporting RLE  -EJ           User Key  (r) = Recorded By, (t) = Taken By, (c) = Cosigned By    Initials Name Provider Type    EJ Palmira Torres, PT Physical Therapist               Goals/Plan     Row Name 09/02/22 1224          Bed Mobility Goal 1 (PT)    Activity/Assistive Device (Bed Mobility Goal 1, PT) bed mobility activities, all  -EJ     [place] Level/Cues Needed (Bed Mobility Goal 1, PT) contact guard required  -EJ     Time Frame (Bed Mobility Goal 1, PT) 1 week  -EJ     Row Name 09/02/22 1224          Transfer Goal 1 (PT)    Activity/Assistive Device (Transfer Goal 1, PT) transfers, all;sit-to-stand/stand-to-sit;bed-to-chair/chair-to-bed;walker, rolling  -EJ     [place] Level/Cues Needed (Transfer Goal 1, PT) minimum assist (75% or more patient effort)  -EJ     Time Frame (Transfer Goal 1, PT) 1 week  -EJ     Row Name 09/02/22 1224          Gait Training Goal 1 (PT)    Activity/Assistive Device  (Gait Training Goal 1, PT) gait (walking locomotion);walker, rolling  -EJ     [place] Level (Gait Training Goal 1, PT) minimum assist (75% or more patient effort)  -EJ     Distance (Gait Training Goal 1, PT) 3  -EJ     Time Frame (Gait Training Goal 1, PT) 1 week  -EJ     Row Name 09/02/22 1224          Therapy Assessment/Plan (PT)    Planned Therapy Interventions (PT) balance training;bed mobility training;gait training;home exercise program;patient/family education;stretching;strengthening;ROM (range of motion);transfer training  -EJ           User Key  (r) = Recorded By, (t) = Taken By, (c) = Cosigned By    Initials Name Provider Type    EJ Palmira Torres, PT Physical Therapist               Clinical Impression     Row Name 09/02/22 1215          Pain    Posttreatment Pain Rating 8/10  -EJ     Pain Location - Side/Orientation Right  -EJ     Pain Location lower  -EJ     Pain Location - extremity;knee  -EJ     Pain Intervention(s) Repositioned  -EJ     Row Name 09/02/22 1215          Plan of Care Review    Plan of Care Reviewed With patient;spouse  -EJ     Progress improving  -EJ     Outcome Evaluation Pt seen for PT eval this am. She is s/p R knee spacer implant and presents w increased post op pain, weakness, decreased activity tolerance, and overall decreased functional mobility. Pt states this is 3rd knee surgery. She is TTWB to RLE. Pt limited today due to increased pain. She was able to sit EOB w therapist fully supporting RLE. She was not able to allow RLE be lowered to floor due to pain. She tolerated sitting for several minutes and became tearful due to pain. Pt assisted back to bed w RLE elevated. She plans SNU at [place] and will continue to benefit from skilled PT to maximize safety, strength, and independence w mobility.  -EJ     Row Name 09/02/22 1215          Therapy Assessment/Plan (PT)    Rehab Potential (PT) fair, will monitor progress closely  -EJ     Criteria for Skilled Interventions Met  (PT) yes  -EJ     Therapy Frequency (PT) 6 times/wk  -EJ     Row Name 09/02/22 1215          Positioning and Restraints    Pre-Treatment Position in bed  -EJ     Post Treatment Position bed  -EJ     In Bed notified nsg;supine;call light within reach;encouraged to call for assist;exit alarm on;with family/caregiver  -EJ           User Key  (r) = Recorded By, (t) = Taken By, (c) = Cosigned By    Initials Name Provider Type    Palmira Bautista, PT Physical Therapist               Outcome Measures     Row Name 09/02/22 1224 09/02/22 0753       How much help from another person do you currently need...    Turning from your back to your side while in flat bed without using bedrails? 3  -EJ 3  -MS    Moving from lying on back to sitting on the side of a flat bed without bedrails? 3  -EJ 3  -MS    Moving to and from a bed to a chair (including a wheelchair)? 1  -EJ 1  -MS    Standing up from a chair using your arms (e.g., wheelchair, bedside chair)? 1  -EJ 1  -MS    Climbing 3-5 steps with a railing? 1  -EJ 1  -MS    To walk in hospital room? 1  -EJ 1  -MS    AM-PAC 6 Clicks Score (PT) 10  -EJ 10  -MS    Highest level of mobility 4 --> Transferred to chair/commode  -EJ 4 --> Transferred to chair/commode  -MS          User Key  (r) = Recorded By, (t) = Taken By, (c) = Cosigned By    Initials Name Provider Type    Mich Mahmood, RN Registered Nurse    Palmira Bautista, PT Physical Therapist                             Physical Therapy Education                 Title: PT OT SLP Therapies (In Progress)     Topic: Physical Therapy (In Progress)     Point: Mobility training (Done)     Learning Progress Summary           Patient Acceptance, E,TB,D, VU,NR by CHARLENE at 9/2/2022 1225                   Point: Home exercise program (Not Started)     Learner Progress:  Not documented in this visit.          Point: Body mechanics (Not Started)     Learner Progress:  Not documented in this visit.          Point: Precautions  (Not Started)     Learner Progress:  Not documented in this visit.                      User Key     Initials Effective Dates Name Provider Type Discipline     06/16/21 -  Palmira Torres, PT Physical Therapist PT              PT Recommendation and Plan  Planned Therapy Interventions (PT): balance training, bed mobility training, gait training, home exercise program, patient/family education, stretching, strengthening, ROM (range of motion), transfer training  Plan of Care Reviewed With: patient, spouse  Progress: improving  Outcome Evaluation: Pt seen for PT eval this am. She is s/p R knee spacer implant and presents w increased post op pain, weakness, decreased activity tolerance, and overall decreased functional mobility. Pt states this is 3rd knee surgery. She is TTWB to RLE. Pt limited today due to increased pain. She was able to sit EOB w therapist fully supporting RLE. She was not able to allow RLE be lowered to floor due to pain. She tolerated sitting for several minutes and became tearful due to pain. Pt assisted back to bed w RLE elevated. She plans SNU at [place] and will continue to benefit from skilled PT to maximize safety, strength, and independence w mobility.     Time Calculation:    PT Charges     Row Name 09/02/22 1226             Time Calculation    Start Time 1135  -EJ      Stop Time 1157  -EJ      Time Calculation (min) 22 min  -EJ      PT Received On 09/02/22  -EJ      PT - Next Appointment 09/03/22  -EJ      PT Goal Re-Cert Due Date 09/09/22  -EJ              Time Calculation- PT    Total Timed Code Minutes- PT 18 minute(s)  -EJ            User Key  (r) = Recorded By, (t) = Taken By, (c) = Cosigned By    Initials Name Provider Type     Palmira Torres, PT Physical Therapist              Therapy Charges for Today     Code Description Service Date Service Provider Modifiers Qty    03137305244 HC PT EVAL MOD COMPLEXITY 3 9/2/2022 Palmira Torres, PT GP 1    71541214932 HC PT THER PROC EA  15 MIN 9/2/2022 Palmira Torres, PT GP 1    61816544844 HC PT THER SUPP EA 15 MIN 9/2/2022 Palmira Torres, PT GP 1          PT G-Codes  AM-PAC 6 Clicks Score (PT): 10    Palmira Torres, PT  9/2/2022

## 2022-09-02 NOTE — PROGRESS NOTES
Name: Fe Arevalo ADMIT: 2022   : 1950  PCP: Dion Curry MD    MRN: [id number] LOS: 3 days   AGE/SEX: 72 y.o. female  ROOM: [facility]     Subjective   Subjective   Patient seen and examined this morning. Hospital day 3. Patient is awake, alert, oriented x3, resting comfortably and without acute complaints, aside from ongoing right knee discomfort. Denies any further symptoms of dyspnea, tachycardia that she experienced on .      Review of Systems   Constitutional: Negative for chills and fever.   Respiratory: Negative for cough and shortness of breath.    Cardiovascular: Negative for chest pain and palpitations.   Gastrointestinal: Negative for abdominal pain.   Musculoskeletal:        + right knee pain   Neurological: Negative for dizziness and light-headedness.        Objective   Objective   Vital Signs  Temp:  [98 °F (36.7 °C)-98.2 °F (36.8 °C)] 98 °F (36.7 °C)  Heart Rate:  [82-93] 82  Resp:  [16] 16  BP: (119-152)/(48-91) 139/91  SpO2:  [95 %-99 %] 99 %  on   ;   Device (Oxygen Therapy): room air  Body mass index is 32.78 kg/m².  Physical Exam  Vitals and nursing note reviewed.   Constitutional:       General: She is not in acute distress.  Eyes:      General: No scleral icterus.     Pupils: Pupils are equal, round, and reactive to light.   Cardiovascular:      Rate and Rhythm: Normal rate. Rhythm irregular.      Pulses: Normal pulses.      Heart sounds: Normal heart sounds.   Pulmonary:      Effort: Pulmonary effort is normal.      Breath sounds: Normal breath sounds.   Abdominal:      General: Bowel sounds are normal.      Palpations: Abdomen is soft.      Tenderness: There is no abdominal tenderness.   Musculoskeletal:         General: Swelling (right knee in bandage with supportive brace) present.   Skin:     General: Skin is warm and dry.   Neurological:      Mental Status: She is alert and oriented to person, place, and time.   Psychiatric:         Mood and Affect: Mood  normal.         Behavior: Behavior normal.       Results Review     I reviewed the patient's new clinical results.  Results from last 7 days   Lab Units 09/02/22  1011 09/01/22  1831 09/01/22  0551 08/31/22  1608 08/31/22  0545 08/30/22  1525   WBC 10*3/mm3 10.20  --  10.91* 5.32  --  11.00*   HEMOGLOBIN g/dL 7.1* 7.6* 6.0* 9.9*   < > 8.2*   PLATELETS 10*3/mm3 377  --  490* 445  --  577*    < > = values in this interval not displayed.     Results from last 7 days   Lab Units 09/02/22  1011 09/01/22  0551 08/31/22  1608 08/30/22  2246   SODIUM mmol/L 134* 133* 136 137   POTASSIUM mmol/L 4.1 4.0 4.1 3.7   CHLORIDE mmol/L 101 100 102 101   CO2 mmol/L 24.1 26.9 24.3 22.8   BUN mg/dL 15 19 18 14   CREATININE mg/dL 1.28* 1.38* 1.45* 1.25*   GLUCOSE mg/dL 154* 146* 122* 135*   EGFR mL/min/1.73 45.2* 41.3* 38.9* 46.5*       Results from last 7 days   Lab Units 09/02/22  1011 09/01/22  0551 08/31/22  1608 08/30/22  2246   CALCIUM mg/dL 9.4 9.6 10.1 9.9   MAGNESIUM mg/dL  --   --  1.8  --    PHOSPHORUS mg/dL  --   --  2.9  --        Glucose   Date/Time Value Ref Range Status   08/30/2022 2056 113 70 - 130 mg/dL Final     Comment:     Meter: [id number] : [id number] Toney Guillen RN       No radiology results for the last day  Scheduled Medications  famotidine, 40 mg, Oral, Daily  hydrALAZINE, 25 mg, Oral, BID  lisinopril-hydroCHLOROthiazide (ZESTORETIC) 20-12.5 mg combo dose, , Oral, Q24H  meclizine, 25 mg, Oral, Daily  metoprolol tartrate, 50 mg, Oral, BID  piperacillin-tazobactam, 3.375 g, Intravenous, Q8H  sodium chloride, 10 mL, Intravenous, Q12H    Infusions  lactated ringers, 9 mL/hr  sodium chloride, 100 mL/hr, Last Rate: 100 mL/hr (09/02/22 0302)    Diet  Diet Regular       Assessment/Plan     Active Hospital Problems    Diagnosis  POA   • **Infected prosthetic knee joint (HCC) [T84.59XA, Z96.659]  Not Applicable   • Chronic a-fib (HCC) [I48.20]  Yes   • Hypertension [I10]  Yes   • CKD (chronic kidney disease) stage 3,  GFR 30-59 ml/min ([facility]) [N18.30]  Yes   • Chronic anticoagulation [Z79.01]  Not Applicable   • Abnormal EKG [R94.31]  No   • Supratherapeutic INR [R79.1]  Yes   • Allergic reaction caused by a drug [T78.40XA]  No      Resolved Hospital Problems   No resolved problems to display.       72 y.o. female admitted with Infected prosthetic knee joint ([facility]). [facility] consulted on 08/30 for evaluation of status and make recommendations regarding treatment for medical management and specifically with regard to potential allergic reaction to IV vitamin K vs. FFP.     Allergic reaction to Vitamin K vs. FFP, resolved: Patient stable and without acute complaints currently. Symptoms resolved. No indications to warrant acute intervention at this time.    Chronic atrial fibrillation with RVR: Rate controlled currently, management per cardiology.     Abnormal EKG with associated chest tightness: Management per cardiology.     Hypertension: BP appears stable at this time. Management per primary.     Chronic kidney disease stage 3A: Creatinine appears stable and near baseline on most recent lab draws. Ensure sufficient hydration, avoid nephrotoxic agents.     Prosthetic joint infection: Management per orthopedic surgery. ID following.        Medicine will sign off at this point. Please call with any questions. Thank you for involving us in the care of this patient.    Dennys Escamilla DO  [facility]  09/02/22  18:49 EDT    I wore protective equipment throughout this patient encounter including a face mask, gloves and protective eyewear.  Hand hygiene was performed before donning protective equipment and after removal when leaving the room.

## 2022-09-02 NOTE — PROGRESS NOTES
"    Patient Name: Fe Arevalo  :1950  72 y.o.      Patient Care Team:  Dion Curry MD as PCP - General  Guillermo Gonzalez MD as Consulting Physician (Cardiac Electrophysiology)    Chief Complaint: follow up atrial fibrillation    Interval History: rate well controlled.        Objective   Vital Signs  Temp:  [97.5 °F (36.4 °C)-98.2 °F (36.8 °C)] 98 °F (36.7 °C)  Heart Rate:  [77-96] 87  Resp:  [16] 16  BP: (119-152)/(48-76) 152/76    Intake/Output Summary (Last 24 hours) at 2022 0949  Last data filed at 2022 0539  Gross per 24 hour   Intake 1940 ml   Output 1250 ml   Net 690 ml     Flowsheet Rows    Flowsheet Row First Filed Value   Admission Height 175.3 cm (69\") Documented at 2022 2317   Admission Weight 101 kg (222 lb) Documented at 2022 1455          Physical Exam:   General Appearance:    Alert, cooperative, in no acute distress   Lungs:     Clear to auscultation.  Normal respiratory effort and rate.      Heart:    irregular rhythm and normal rate, normal S1 and S2, no murmurs, gallops or rubs.     Chest Wall:    No abnormalities observed   Abdomen:     Soft, nontender, positive bowel sounds.     Extremities:   no cyanosis, clubbing  Right leg in brace, wrapped      Results Review:    Results from last 7 days   Lab Units 22  0551   SODIUM mmol/L 133*   POTASSIUM mmol/L 4.0   CHLORIDE mmol/L 100   CO2 mmol/L 26.9   BUN mg/dL 19   CREATININE mg/dL 1.38*   GLUCOSE mg/dL 146*   CALCIUM mg/dL 9.6     Results from last 7 days   Lab Units 22  0545 22  2246   TROPONIN T ng/mL <0.010 <0.010     Results from last 7 days   Lab Units 22  1831 22  0551   WBC 10*3/mm3  --  10.91*   HEMOGLOBIN g/dL 7.6* 6.0*   HEMATOCRIT % 22.9* 17.9*   PLATELETS 10*3/mm3  --  490*     Results from last 7 days   Lab Units 22  1608 22  0545 22  2246 22  1630   INR  2.25*  2.25* 3.60* 4.41* 5.76*   APTT seconds  --  63.3*  --  74.9*     Results " from last 7 days   Lab Units 08/31/22  1608   MAGNESIUM mg/dL 1.8                   Medication Review:   famotidine, 40 mg, Oral, Daily  hydrALAZINE, 25 mg, Oral, BID  lisinopril-hydroCHLOROthiazide (ZESTORETIC) 20-12.5 mg combo dose, , Oral, Q24H  meclizine, 25 mg, Oral, Daily  metoprolol tartrate, 50 mg, Oral, BID  piperacillin-tazobactam, 3.375 g, Intravenous, Q8H  sodium chloride, 10 mL, Intravenous, Q12H         lactated ringers, 9 mL/hr  sodium chloride, 100 mL/hr, Last Rate: 100 mL/hr (09/02/22 0302)        Assessment & Plan   1. Paroxysmal atrial fibrillation - has been more persistent here. Prior history of Afib ablation, surgical DNAISHA occlusion. On chronic anticoagulation with warfarin.   2. Supra therapeutic INR - received FFP  3. Prosthetic knee infection status post explant and placement of antibiotic spacer 8/31/22\  4. Acute blood loss anemia , post op. Status post transfusion  5. Hypertension  6. Pulmonary hypertension, moderate to severe. Chronic. No evidence of heart failure    HR well controlled.   Resume anticoagulation when ok from surgical standpoint.    AFIA Toussaint  [facility]  09/02/22  09:49 EDT

## 2022-09-02 NOTE — PLAN OF CARE
Problem: Adult Inpatient Plan of Care  Goal: Plan of Care Review  Outcome: Ongoing, Progressing  Flowsheets (Taken 9/2/2022 1501)  Progress: improving  Plan of Care Reviewed With: patient  Outcome Evaluation: Patient has been pleasant and cooperative during shift. No complaints of nausea or SOA. Patient treated for pain often. AOx4, assist x1/bedrest, room air. PT sat patient on EOB for several minutes. Sent message to Dr. Hammonsd that patients son would like to speak to him on Monday and would like to know when he rounds. ID has signed off. Will continue to monitor and assist patient as needed.  Goal: Patient-Specific Goal (Individualized)  Outcome: Ongoing, Progressing  Goal: Absence of Hospital-Acquired Illness or Injury  Outcome: Ongoing, Progressing  Intervention: Identify and Manage Fall Risk  Recent Flowsheet Documentation  Taken 9/2/2022 1345 by Mich Nolan RN  Safety Promotion/Fall Prevention:   assistive device/personal items within reach   fall prevention program maintained   nonskid shoes/slippers when out of bed   safety round/check completed  Taken 9/2/2022 1200 by Mich Nolan RN  Safety Promotion/Fall Prevention:   assistive device/personal items within reach   fall prevention program maintained   nonskid shoes/slippers when out of bed   safety round/check completed  Taken 9/2/2022 0957 by Mich Nolan RN  Safety Promotion/Fall Prevention:   assistive device/personal items within reach   fall prevention program maintained   nonskid shoes/slippers when out of bed   safety round/check completed  Taken 9/2/2022 0753 by Mich Nolan RN  Safety Promotion/Fall Prevention:   assistive device/personal items within reach   fall prevention program maintained   nonskid shoes/slippers when out of bed   safety round/check completed  Intervention: Prevent Skin Injury  Recent Flowsheet Documentation  Taken 9/2/2022 1345 by Mich Nolan RN  Body Position: supine  Skin Protection:    tubing/devices free from skin contact   incontinence pads utilized  Taken 9/2/2022 1200 by Mich Nolan RN  Body Position: supine  Taken 9/2/2022 0957 by Mich Nolan RN  Body Position: supine  Taken 9/2/2022 0753 by Mich Nolan RN  Body Position:   right   tilted  Skin Protection:   tubing/devices free from skin contact   incontinence pads utilized  Intervention: Prevent and Manage VTE (Venous Thromboembolism) Risk  Recent Flowsheet Documentation  Taken 9/2/2022 1345 by Mich Nolan RN  Activity Management: activity adjusted per tolerance  Taken 9/2/2022 1200 by Mich Nolan RN  Activity Management: activity adjusted per tolerance  Taken 9/2/2022 0957 by Mich Nolan RN  Activity Management: activity adjusted per tolerance  Taken 9/2/2022 0753 by Mich Nolan RN  Activity Management: activity adjusted per tolerance  Goal: Optimal Comfort and Wellbeing  Outcome: Ongoing, Progressing  Goal: Readiness for Transition of Care  Outcome: Ongoing, Progressing     Problem: Fall Injury Risk  Goal: Absence of Fall and Fall-Related Injury  Outcome: Ongoing, Progressing  Intervention: Promote Injury-Free Environment  Recent Flowsheet Documentation  Taken 9/2/2022 1345 by Mich Nolan RN  Safety Promotion/Fall Prevention:   assistive device/personal items within reach   fall prevention program maintained   nonskid shoes/slippers when out of bed   safety round/check completed  Taken 9/2/2022 1200 by Mich Nolan RN  Safety Promotion/Fall Prevention:   assistive device/personal items within reach   fall prevention program maintained   nonskid shoes/slippers when out of bed   safety round/check completed  Taken 9/2/2022 0957 by Mich Nolan RN  Safety Promotion/Fall Prevention:   assistive device/personal items within reach   fall prevention program maintained   nonskid shoes/slippers when out of bed   safety round/check completed  Taken 9/2/2022 0753 by Mich Nolan  RN  Safety Promotion/Fall Prevention:   assistive device/personal items within reach   fall prevention program maintained   nonskid shoes/slippers when out of bed   safety round/check completed     Problem: Hypertension Comorbidity  Goal: Blood Pressure in Desired Range  Outcome: Ongoing, Progressing     Problem: Skin Injury Risk Increased  Goal: Skin Health and Integrity  Outcome: Ongoing, Progressing  Intervention: Optimize Skin Protection  Recent Flowsheet Documentation  Taken 9/2/2022 1345 by Mich Nolan RN  Pressure Reduction Techniques:   frequent weight shift encouraged   weight shift assistance provided  Head of Bed (HOB) Positioning: HOB at 30 degrees  Pressure Reduction Devices: pressure-redistributing mattress utilized  Skin Protection:   tubing/devices free from skin contact   incontinence pads utilized  Taken 9/2/2022 1200 by Mich Nolan RN  Head of Bed (HOB) Positioning: HOB at 30 degrees  Taken 9/2/2022 0957 by Mich Nolan RN  Head of Bed (HOB) Positioning: HOB at 30 degrees  Taken 9/2/2022 0753 by Mich Nolan RN  Pressure Reduction Techniques:   frequent weight shift encouraged   weight shift assistance provided  Head of Bed (HOB) Positioning: HOB at 30 degrees  Pressure Reduction Devices: pressure-redistributing mattress utilized  Skin Protection:   tubing/devices free from skin contact   incontinence pads utilized   Goal Outcome Evaluation:  Plan of Care Reviewed With: patient        Progress: improving  Outcome Evaluation: Patient has been pleasant and cooperative during shift. No complaints of nausea or SOA. Patient treated for pain often. AOx4, assist x1/bedrest, room air. PT sat patient on EOB for several minutes. Sent message to Dr. Hammonds that patients son would like to speak to him on Monday and would like to know when he rounds. ID has signed off. Will continue to monitor and assist patient as needed.

## 2022-09-02 NOTE — PROGRESS NOTES
LOS: 3 days     Chief Complaint: Right prosthetic knee septic arthritis    Interval History: Afebrile, no new complaints.  States pain is well controlled.  Tolerating antibiotics without abdominal pain nausea vomiting or diarrhea.  No shortness of breath or cough.  No rashes    Vital Signs  Temp:  [97.5 °F (36.4 °C)-98.2 °F (36.8 °C)] 98 °F (36.7 °C)  Heart Rate:  [77-93] 87  Resp:  [16] 16  BP: (119-152)/(48-76) 152/76    Physical Exam:  General: In no acute distress  Cardiovascular: RRR, no lower extremity edema  Respiratory: Breathing comfortably on room air  GI: Soft, NT/ND, + bowel sounds bilaterally,  Skin: No rashes   Extremities:  Right knee covered in dressing, drain in place    Antibiotics:  Zosyn 3.375 g IV every 8 hours     Results Review:    Lab Results   Component Value Date    WBC 10.91 (H) 09/01/2022    HGB 7.6 (L) 09/01/2022    HCT 22.9 (L) 09/01/2022    MCV 87.7 09/01/2022     (H) 09/01/2022     Lab Results   Component Value Date    GLUCOSE 146 (H) 09/01/2022    BUN 19 09/01/2022    CREATININE 1.38 (H) 09/01/2022    BCR 13.8 09/01/2022    CO2 26.9 09/01/2022    CALCIUM 9.6 09/01/2022    ALBUMIN 2.80 (L) 08/20/2022    LABIL2 1.2 01/22/2019    AST 18 08/20/2022    ALT 21 08/20/2022     CRP  4.72 -> 15.34 -> 2.42  ESR 48 -> 57 -> 79    Microbiology:  8/31 OR cx NGTD    8/18 OR cx Pseudomonas and Providencia  8/16 right knee superficial wound cultures Romanus, juliana, Enterococcus faecalis    Assessment & Plan   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  Continue Zosyn 3.375 g IV every 8 hours.  We will treat with a 6-week course of antibiotics and reset her antibiotic start time to August 31.    Final antibiotic  recommendations  1.  Zosyn 3.375 g IV every 8 hours x6 weeks.  Antibiotic stop date October 12     2.  Please monitor weekly CBC with differential and creatinine and fax the results to the infectious disease clinic at [phone]     3.  We will arrange for ID follow-up on October 7    ID will sign off.  Please do not hesitate to call us with further questions or concerns

## 2022-09-02 NOTE — PLAN OF CARE
Goal Outcome Evaluation:               Patient resting in bed VSS, Afib on  tele, No sign of acute distress  C/O pain in R lowerr extremitie  medicated as order Room air

## 2022-09-03 LAB
ABO GROUP BLD: NORMAL
ANION GAP SERPL CALCULATED.3IONS-SCNC: 6.9 MMOL/L (ref 5–15)
BASOPHILS # BLD AUTO: 0.02 10*3/MM3 (ref 0–0.2)
BASOPHILS NFR BLD AUTO: 0.3 % (ref 0–1.5)
BLD GP AB SCN SERPL QL: NEGATIVE
BUN SERPL-MCNC: 15 MG/DL (ref 8–23)
BUN/CREAT SERPL: 11.9 (ref 7–25)
CALCIUM SPEC-SCNC: 9.5 MG/DL (ref 8.6–10.5)
CHLORIDE SERPL-SCNC: 102 MMOL/L (ref 98–107)
CO2 SERPL-SCNC: 27.1 MMOL/L (ref 22–29)
CREAT SERPL-MCNC: 1.26 MG/DL (ref 0.57–1)
DEPRECATED RDW RBC AUTO: 46.1 FL (ref 37–54)
EGFRCR SERPLBLD CKD-EPI 2021: 46 ML/MIN/1.73
EOSINOPHIL # BLD AUTO: 0.32 10*3/MM3 (ref 0–0.4)
EOSINOPHIL NFR BLD AUTO: 4 % (ref 0.3–6.2)
ERYTHROCYTE [DISTWIDTH] IN BLOOD BY AUTOMATED COUNT: 14.7 % (ref 12.3–15.4)
GLUCOSE SERPL-MCNC: 100 MG/DL (ref 65–99)
HCT VFR BLD AUTO: 21.6 % (ref 34–46.6)
HGB BLD-MCNC: 6.8 G/DL (ref 12–15.9)
IMM GRANULOCYTES # BLD AUTO: 0.13 10*3/MM3 (ref 0–0.05)
IMM GRANULOCYTES NFR BLD AUTO: 1.6 % (ref 0–0.5)
LYMPHOCYTES # BLD AUTO: 0.82 10*3/MM3 (ref 0.7–3.1)
LYMPHOCYTES NFR BLD AUTO: 10.3 % (ref 19.6–45.3)
MCH RBC QN AUTO: 28 PG (ref 26.6–33)
MCHC RBC AUTO-ENTMCNC: 31.5 G/DL (ref 31.5–35.7)
MCV RBC AUTO: 88.9 FL (ref 79–97)
MONOCYTES # BLD AUTO: 0.84 10*3/MM3 (ref 0.1–0.9)
MONOCYTES NFR BLD AUTO: 10.5 % (ref 5–12)
NEUTROPHILS NFR BLD AUTO: 5.84 10*3/MM3 (ref 1.7–7)
NEUTROPHILS NFR BLD AUTO: 73.3 % (ref 42.7–76)
NRBC BLD AUTO-RTO: 0 /100 WBC (ref 0–0.2)
PLATELET # BLD AUTO: 407 10*3/MM3 (ref 140–450)
PMV BLD AUTO: 9.8 FL (ref 6–12)
POTASSIUM SERPL-SCNC: 4.2 MMOL/L (ref 3.5–5.2)
RBC # BLD AUTO: 2.43 10*6/MM3 (ref 3.77–5.28)
RH BLD: NEGATIVE
SODIUM SERPL-SCNC: 136 MMOL/L (ref 136–145)
T&S EXPIRATION DATE: NORMAL
WBC NRBC COR # BLD: 7.97 10*3/MM3 (ref 3.4–10.8)

## 2022-09-03 PROCEDURE — 25010000002 HYDROMORPHONE 1 MG/ML SOLUTION: Performed by: ORTHOPAEDIC SURGERY

## 2022-09-03 PROCEDURE — 85025 COMPLETE CBC W/AUTO DIFF WBC: CPT | Performed by: ORTHOPAEDIC SURGERY

## 2022-09-03 PROCEDURE — 36430 TRANSFUSION BLD/BLD COMPNT: CPT

## 2022-09-03 PROCEDURE — 86900 BLOOD TYPING SEROLOGIC ABO: CPT

## 2022-09-03 PROCEDURE — 86850 RBC ANTIBODY SCREEN: CPT | Performed by: NURSE PRACTITIONER

## 2022-09-03 PROCEDURE — 99232 SBSQ HOSP IP/OBS MODERATE 35: CPT | Performed by: NURSE PRACTITIONER

## 2022-09-03 PROCEDURE — 25010000002 PIPERACILLIN SOD-TAZOBACTAM PER 1 G: Performed by: ORTHOPAEDIC SURGERY

## 2022-09-03 PROCEDURE — 86900 BLOOD TYPING SEROLOGIC ABO: CPT | Performed by: NURSE PRACTITIONER

## 2022-09-03 PROCEDURE — 80048 BASIC METABOLIC PNL TOTAL CA: CPT | Performed by: ORTHOPAEDIC SURGERY

## 2022-09-03 PROCEDURE — 86923 COMPATIBILITY TEST ELECTRIC: CPT

## 2022-09-03 PROCEDURE — P9016 RBC LEUKOCYTES REDUCED: HCPCS

## 2022-09-03 PROCEDURE — 86901 BLOOD TYPING SEROLOGIC RH(D): CPT | Performed by: NURSE PRACTITIONER

## 2022-09-03 PROCEDURE — 99024 POSTOP FOLLOW-UP VISIT: CPT | Performed by: ORTHOPAEDIC SURGERY

## 2022-09-03 RX ORDER — CETIRIZINE HYDROCHLORIDE 10 MG/1
5 TABLET ORAL ONCE
Status: COMPLETED | OUTPATIENT
Start: 2022-09-03 | End: 2022-09-03

## 2022-09-03 RX ORDER — ACETAMINOPHEN 325 MG/1
650 TABLET ORAL EVERY 6 HOURS PRN
Status: DISCONTINUED | OUTPATIENT
Start: 2022-09-03 | End: 2022-09-07 | Stop reason: HOSPADM

## 2022-09-03 RX ADMIN — Medication 10 ML: at 20:10

## 2022-09-03 RX ADMIN — METOPROLOL TARTRATE 50 MG: 50 TABLET, FILM COATED ORAL at 09:19

## 2022-09-03 RX ADMIN — SODIUM CHLORIDE 100 ML/HR: 9 INJECTION, SOLUTION INTRAVENOUS at 21:10

## 2022-09-03 RX ADMIN — FAMOTIDINE 40 MG: 20 TABLET ORAL at 09:18

## 2022-09-03 RX ADMIN — HYDROMORPHONE HYDROCHLORIDE 1 MG: 1 INJECTION, SOLUTION INTRAMUSCULAR; INTRAVENOUS; SUBCUTANEOUS at 05:22

## 2022-09-03 RX ADMIN — HYDROMORPHONE HYDROCHLORIDE 1 MG: 1 INJECTION, SOLUTION INTRAMUSCULAR; INTRAVENOUS; SUBCUTANEOUS at 20:10

## 2022-09-03 RX ADMIN — CETIRIZINE HYDROCHLORIDE 5 MG: 10 TABLET ORAL at 15:34

## 2022-09-03 RX ADMIN — TAZOBACTAM SODIUM AND PIPERACILLIN SODIUM 3.38 G: 375; 3 INJECTION, SOLUTION INTRAVENOUS at 21:10

## 2022-09-03 RX ADMIN — HYDROCODONE BITARTRATE AND ACETAMINOPHEN 2 TABLET: 10; 325 TABLET ORAL at 23:11

## 2022-09-03 RX ADMIN — TAZOBACTAM SODIUM AND PIPERACILLIN SODIUM 3.38 G: 375; 3 INJECTION, SOLUTION INTRAVENOUS at 05:22

## 2022-09-03 RX ADMIN — HYDRALAZINE HYDROCHLORIDE 25 MG: 25 TABLET, FILM COATED ORAL at 20:10

## 2022-09-03 RX ADMIN — ACETAMINOPHEN 650 MG: 325 TABLET, FILM COATED ORAL at 22:04

## 2022-09-03 RX ADMIN — METOPROLOL TARTRATE 50 MG: 50 TABLET, FILM COATED ORAL at 20:10

## 2022-09-03 RX ADMIN — MECLIZINE HYDROCHLORIDE 25 MG: 25 TABLET ORAL at 09:19

## 2022-09-03 RX ADMIN — HYDROCODONE BITARTRATE AND ACETAMINOPHEN 2 TABLET: 10; 325 TABLET ORAL at 09:18

## 2022-09-03 RX ADMIN — LISINOPRIL: 20 TABLET ORAL at 09:22

## 2022-09-03 RX ADMIN — Medication 10 ML: at 09:21

## 2022-09-03 RX ADMIN — HYDRALAZINE HYDROCHLORIDE 25 MG: 25 TABLET, FILM COATED ORAL at 09:20

## 2022-09-03 RX ADMIN — HYDROCODONE BITARTRATE AND ACETAMINOPHEN 2 TABLET: 10; 325 TABLET ORAL at 15:34

## 2022-09-03 RX ADMIN — HYDROMORPHONE HYDROCHLORIDE 1 MG: 1 INJECTION, SOLUTION INTRAMUSCULAR; INTRAVENOUS; SUBCUTANEOUS at 12:57

## 2022-09-03 NOTE — PLAN OF CARE
Goal Outcome Evaluation:     Patient is resting well at this time and is tolerating her pain medication without any adverse side effects noted. She has the right affected knee up on pillows and has an icepack atop of the knee for added comfort and to help with swelling. VSS, and he  is at bedside and attentive to her needs. Patient continues on IV fluids and is tolerating her IV antibiotics without any side effects noted.

## 2022-09-03 NOTE — SIGNIFICANT NOTE
09/03/22 0853   OTHER   Discipline physical therapist   Rehab Time/Intention   Session Not Performed other (see comments)  (Pt. Hgb subtherapeutic at 6.8. Will f/u 9/4.)   Recommendation   PT - Next Appointment 09/04/22

## 2022-09-03 NOTE — PROGRESS NOTES
Hospital Follow Up    LOS:  LOS: 4 days   Patient Name: Fe Arevalo  Age/Sex: 72 y.o. female  : 1950  MRN: [id number]    Day of Service: 22   Length of Stay: 4  Encounter Provider: AFIA Mcneill  Place of Service: [facility] CARDIOLOGY  Patient Care Team:  Dion Curry MD as PCP - General  Carlos, Guillermo Epps MD as Consulting Physician (Cardiac Electrophysiology)    Subjective:     Chief Complaint: follow up atrial fibrillation    Interval History: No complaints of chest pain or SOA. Very concerned that she is not on her anticoagulation    Objective:     Objective:  Temp:  [98.1 °F (36.7 °C)-98.6 °F (37 °C)] 98.1 °F (36.7 °C)  Heart Rate:  [73-95] 81  Resp:  [16] 16  BP: (128-141)/(55-91) 141/66     Intake/Output Summary (Last 24 hours) at 9/3/2022 1236  Last data filed at 9/3/2022 1052  Gross per 24 hour   Intake 120 ml   Output 1500 ml   Net -1380 ml     Body mass index is 32.78 kg/m².      22  1455 22  2317 22  1224   Weight: 101 kg (222 lb) 101 kg (222 lb) 101 kg (222 lb)     Weight change:     Physical Exam:   General Appearance:    Awake alert and oriented in no acute distress.   Color:  Skin:  Neuro:  HEENT:    Lungs:     Pink  Warm and dry  No focal, motor or sensory deficits  Neck supple, pupils equal, round and reactive. No JVD, No Bruit  Clear to auscultation,respirations regular, even and                  unlabored    Heart:    Irr/Irr, S1 and S2, 2/6 sys murmur, no gallop, no rub. No edema, DP/PT pulses are 2+   Chest Wall:    No abnormalities observed   Abdomen:     Normal bowel sounds, no masses, no organomegaly, soft        non-tender, non-distended, no guarding, no ascites noted   Extremities:   Moves all extremities well, no edema, no cyanosis, no redness       Lab Review:   Results from last 7 days   Lab Units 22  0656 22  1011   SODIUM mmol/L 136 134*   POTASSIUM mmol/L 4.2 4.1   CHLORIDE mmol/L 102  101   CO2 mmol/L 27.1 24.1   BUN mg/dL 15 15   CREATININE mg/dL 1.26* 1.28*   GLUCOSE mg/dL 100* 154*   CALCIUM mg/dL 9.5 9.4     Results from last 7 days   Lab Units 08/31/22  0545 08/30/22  2246   TROPONIN T ng/mL <0.010 <0.010     Results from last 7 days   Lab Units 09/03/22  0656 09/02/22  1011   WBC 10*3/mm3 7.97 10.20   HEMOGLOBIN g/dL 6.8* 7.1*   HEMATOCRIT % 21.6* 22.5*   PLATELETS 10*3/mm3 407 377     Results from last 7 days   Lab Units 08/31/22  1608 08/31/22  0545 08/30/22  2246 08/30/22  1630   INR  2.25*  2.25* 3.60*   < > 5.76*   APTT seconds  --  63.3*  --  74.9*    < > = values in this interval not displayed.     Results from last 7 days   Lab Units 08/31/22  1608   MAGNESIUM mg/dL 1.8           Invalid input(s): LDLCALC          I reviewed the patient's new clinical results.  I personally viewed and interpreted the patient's EKG  Current Medications:   Scheduled Meds:famotidine, 40 mg, Oral, Daily  hydrALAZINE, 25 mg, Oral, BID  lisinopril-hydroCHLOROthiazide (ZESTORETIC) 20-12.5 mg combo dose, , Oral, Q24H  meclizine, 25 mg, Oral, Daily  metoprolol tartrate, 50 mg, Oral, BID  piperacillin-tazobactam, 3.375 g, Intravenous, Q8H  sodium chloride, 10 mL, Intravenous, Q12H      Continuous Infusions:lactated ringers, 9 mL/hr  sodium chloride, 100 mL/hr, Last Rate: 100 mL/hr (09/02/22 0302)        Allergies:  Allergies   Allergen Reactions   • Keflex [Cephalexin] Hives   • Pantoprazole Anaphylaxis and Angioedema   • Phytonadione [Vitamin K1] Shortness Of Breath and Palpitations     IV route   • Cephalosporins Hives   • Morphine And Related Other (See Comments) and Palpitations     Pt does not like the way it makes her feel.  Does not want it..     • Percocet [Oxycodone-Acetaminophen] Nausea Only   • Adhesive Tape Other (See Comments)     BLISTER   • Benadryl [Diphenhydramine] Palpitations   • Ciprofloxacin Rash   • Codeine Other (See Comments)     CHEST PAIN       Assessment:       Infected prosthetic  knee joint (HCC)    Chronic a-fib (HCC)    Hypertension    CKD (chronic kidney disease) stage 3, GFR 30-59 ml/min (HCC)    Chronic anticoagulation    Abnormal EKG    Supratherapeutic INR    Allergic reaction caused by a drug        Plan:   1. PAF: on warfarin for anticoagulation. Currently on hold. Will resume once ok with surgical team  2. Supratherapeutic INR: received FFP  3. Prosthetic knee infection s/p explant and placement of antibiotic spacer on 8/31  4. Anemia due to acute blood loss: s/p transfusion. Hgb 6.8 today  5. HTN: BP stable on current regimen. No changes  6. Pulmonary hypertension: moderate to severe. No symptoms currently.      Discussed with patient that we will not be able to resume anticoagulation until her hemoglobin is more stable. Verbalized understanding.   She is to be transfused again today.    AFIA Mcneill  09/03/22  12:36 EDT  Electronically signed by AFIA Mcneill, 09/03/22, 12:36 PM EDT.

## 2022-09-03 NOTE — PROGRESS NOTES
Orthopedic Progress Note      Patient: Fe Arevalo  Date of Admission: 8/30/2022  YOB: 1950  Medical Record Number: [id number]    POD # :  3 Days Post-Op Procedure(s) (LRB):  RIGHT KNEE IMPLANT ANTIBIOTIC SPACER (Right)    Systemic or Specific Complaints: Pain Control    Pain Relief: some relief    Physical Exam:  72 y.o.  female  Vitals:  Temp:  [98.1 °F (36.7 °C)-98.6 °F (37 °C)] 98.1 °F (36.7 °C)  Heart Rate:  [73-95] 81  Resp:  [16] 16  BP: (128-141)/(55-91) 141/66  alert and oriented  Chest: Clear to auscultation  CV: Regular Rate and Rhythm  Abd: Soft, NT, with BS +  Ext: NV intact. ROM appropriate. Calf is soft and nontender. Negative Homans sign  Skin: Incision clean dry and intact w/out signs or  symptoms of infection/knee immobilizer intact    Activity: Mobilizing Per P.T.   Weight Bearing: TTWB RLE    Data Review     No results displayed because visit has over 200 results.          No results found.    Medications:  famotidine, 40 mg, Oral, Daily  hydrALAZINE, 25 mg, Oral, BID  lisinopril-hydroCHLOROthiazide (ZESTORETIC) 20-12.5 mg combo dose, , Oral, Q24H  meclizine, 25 mg, Oral, Daily  metoprolol tartrate, 50 mg, Oral, BID  piperacillin-tazobactam, 3.375 g, Intravenous, Q8H  sodium chloride, 10 mL, Intravenous, Q12H      HYDROcodone-acetaminophen **OR** HYDROcodone-acetaminophen    HYDROmorphone **AND** naloxone    ondansetron    sodium chloride    Assessment:  Doing well POD  # 3 Days Post-Op Procedure(s) (LRB):  RIGHT KNEE IMPLANT ANTIBIOTIC SPACER (Right)  Problems Addressed this Visit    None       Visit Diagnoses       Infection of total knee replacement (HCC)        Relevant Orders    Anaerobic Culture - Tissue, Knee, Right    Tissue / Bone Culture - Tissue, Knee, Right (Completed)          Diagnoses         Codes Comments    Infection of total knee replacement (HCC)     ICD-10-CM: T84.59XA, Z96.659  ICD-9-CM: 996.66, V43.65             Plan:  Continue efforts to mobilize -  TTWB RLE with Knee immoblizer  Continue Pain Control Measures - Oral's  Continue incisional Care - per Dr. Hammonds's previous note, start changes on Sunday  DVT prophylaxis  D/C drain when output is less than 30mL    Discharge Plan: will defer to Dr. Hammonds, bharti will be here through the weekend    AFIA Wilson    Date: 9/3/2022  Time: 09:14 EDT      I agree with the above. Her Hgb was 6.8, vitals are stable but she did seem a little weak /sluggish when seen this morning so we will plan to give her some blood and re check labs.    Carlos Rodney MD

## 2022-09-04 LAB
ANION GAP SERPL CALCULATED.3IONS-SCNC: 10 MMOL/L (ref 5–15)
BACTERIA SPEC AEROBE CULT: ABNORMAL
BACTERIA SPEC AEROBE CULT: ABNORMAL
BASOPHILS # BLD AUTO: 0.03 10*3/MM3 (ref 0–0.2)
BASOPHILS NFR BLD AUTO: 0.4 % (ref 0–1.5)
BH BB BLOOD EXPIRATION DATE: NORMAL
BH BB BLOOD EXPIRATION DATE: NORMAL
BH BB BLOOD TYPE BARCODE: 5100
BH BB BLOOD TYPE BARCODE: 5100
BH BB DISPENSE STATUS: NORMAL
BH BB DISPENSE STATUS: NORMAL
BH BB PRODUCT CODE: NORMAL
BH BB PRODUCT CODE: NORMAL
BH BB UNIT NUMBER: NORMAL
BH BB UNIT NUMBER: NORMAL
BUN SERPL-MCNC: 15 MG/DL (ref 8–23)
BUN/CREAT SERPL: 13.2 (ref 7–25)
CALCIUM SPEC-SCNC: 9.8 MG/DL (ref 8.6–10.5)
CHLORIDE SERPL-SCNC: 101 MMOL/L (ref 98–107)
CO2 SERPL-SCNC: 23 MMOL/L (ref 22–29)
CREAT SERPL-MCNC: 1.14 MG/DL (ref 0.57–1)
CROSSMATCH INTERPRETATION: NORMAL
CROSSMATCH INTERPRETATION: NORMAL
DEPRECATED RDW RBC AUTO: 44 FL (ref 37–54)
EGFRCR SERPLBLD CKD-EPI 2021: 51.9 ML/MIN/1.73
EOSINOPHIL # BLD AUTO: 0.3 10*3/MM3 (ref 0–0.4)
EOSINOPHIL NFR BLD AUTO: 3.6 % (ref 0.3–6.2)
ERYTHROCYTE [DISTWIDTH] IN BLOOD BY AUTOMATED COUNT: 13.9 % (ref 12.3–15.4)
GLUCOSE SERPL-MCNC: 132 MG/DL (ref 65–99)
GRAM STN SPEC: ABNORMAL
GRAM STN SPEC: ABNORMAL
HCT VFR BLD AUTO: 27.4 % (ref 34–46.6)
HGB BLD-MCNC: 9 G/DL (ref 12–15.9)
IMM GRANULOCYTES # BLD AUTO: 0.13 10*3/MM3 (ref 0–0.05)
IMM GRANULOCYTES NFR BLD AUTO: 1.6 % (ref 0–0.5)
LYMPHOCYTES # BLD AUTO: 0.76 10*3/MM3 (ref 0.7–3.1)
LYMPHOCYTES NFR BLD AUTO: 9.1 % (ref 19.6–45.3)
MCH RBC QN AUTO: 28.4 PG (ref 26.6–33)
MCHC RBC AUTO-ENTMCNC: 32.8 G/DL (ref 31.5–35.7)
MCV RBC AUTO: 86.4 FL (ref 79–97)
MONOCYTES # BLD AUTO: 0.76 10*3/MM3 (ref 0.1–0.9)
MONOCYTES NFR BLD AUTO: 9.1 % (ref 5–12)
NEUTROPHILS NFR BLD AUTO: 6.33 10*3/MM3 (ref 1.7–7)
NEUTROPHILS NFR BLD AUTO: 76.2 % (ref 42.7–76)
NRBC BLD AUTO-RTO: 0.1 /100 WBC (ref 0–0.2)
PLATELET # BLD AUTO: 397 10*3/MM3 (ref 140–450)
PMV BLD AUTO: 9.9 FL (ref 6–12)
POTASSIUM SERPL-SCNC: 3.9 MMOL/L (ref 3.5–5.2)
RBC # BLD AUTO: 3.17 10*6/MM3 (ref 3.77–5.28)
SODIUM SERPL-SCNC: 134 MMOL/L (ref 136–145)
UNIT  ABO: NORMAL
UNIT  ABO: NORMAL
UNIT  RH: NORMAL
UNIT  RH: NORMAL
WBC NRBC COR # BLD: 8.31 10*3/MM3 (ref 3.4–10.8)

## 2022-09-04 PROCEDURE — P9016 RBC LEUKOCYTES REDUCED: HCPCS

## 2022-09-04 PROCEDURE — 25010000002 HYDROMORPHONE 1 MG/ML SOLUTION: Performed by: ORTHOPAEDIC SURGERY

## 2022-09-04 PROCEDURE — 99232 SBSQ HOSP IP/OBS MODERATE 35: CPT | Performed by: NURSE PRACTITIONER

## 2022-09-04 PROCEDURE — 25010000002 PIPERACILLIN SOD-TAZOBACTAM PER 1 G: Performed by: ORTHOPAEDIC SURGERY

## 2022-09-04 PROCEDURE — 36430 TRANSFUSION BLD/BLD COMPNT: CPT

## 2022-09-04 PROCEDURE — 86900 BLOOD TYPING SEROLOGIC ABO: CPT

## 2022-09-04 PROCEDURE — 97530 THERAPEUTIC ACTIVITIES: CPT

## 2022-09-04 PROCEDURE — 80048 BASIC METABOLIC PNL TOTAL CA: CPT | Performed by: ORTHOPAEDIC SURGERY

## 2022-09-04 PROCEDURE — 85025 COMPLETE CBC W/AUTO DIFF WBC: CPT | Performed by: ORTHOPAEDIC SURGERY

## 2022-09-04 PROCEDURE — 99024 POSTOP FOLLOW-UP VISIT: CPT | Performed by: ORTHOPAEDIC SURGERY

## 2022-09-04 RX ADMIN — METOPROLOL TARTRATE 50 MG: 50 TABLET, FILM COATED ORAL at 08:24

## 2022-09-04 RX ADMIN — HYDROMORPHONE HYDROCHLORIDE 1 MG: 1 INJECTION, SOLUTION INTRAMUSCULAR; INTRAVENOUS; SUBCUTANEOUS at 16:07

## 2022-09-04 RX ADMIN — HYDRALAZINE HYDROCHLORIDE 25 MG: 25 TABLET, FILM COATED ORAL at 20:55

## 2022-09-04 RX ADMIN — SODIUM CHLORIDE 100 ML/HR: 9 INJECTION, SOLUTION INTRAVENOUS at 19:34

## 2022-09-04 RX ADMIN — HYDROMORPHONE HYDROCHLORIDE 1 MG: 1 INJECTION, SOLUTION INTRAMUSCULAR; INTRAVENOUS; SUBCUTANEOUS at 19:34

## 2022-09-04 RX ADMIN — METOPROLOL TARTRATE 50 MG: 50 TABLET, FILM COATED ORAL at 20:55

## 2022-09-04 RX ADMIN — TAZOBACTAM SODIUM AND PIPERACILLIN SODIUM 3.38 G: 375; 3 INJECTION, SOLUTION INTRAVENOUS at 22:08

## 2022-09-04 RX ADMIN — HYDROCODONE BITARTRATE AND ACETAMINOPHEN 2 TABLET: 10; 325 TABLET ORAL at 03:12

## 2022-09-04 RX ADMIN — Medication 10 ML: at 20:56

## 2022-09-04 RX ADMIN — HYDROCODONE BITARTRATE AND ACETAMINOPHEN 2 TABLET: 10; 325 TABLET ORAL at 12:57

## 2022-09-04 RX ADMIN — Medication 10 ML: at 08:25

## 2022-09-04 RX ADMIN — TAZOBACTAM SODIUM AND PIPERACILLIN SODIUM 3.38 G: 375; 3 INJECTION, SOLUTION INTRAVENOUS at 13:32

## 2022-09-04 RX ADMIN — HYDROCODONE BITARTRATE AND ACETAMINOPHEN 2 TABLET: 10; 325 TABLET ORAL at 08:24

## 2022-09-04 RX ADMIN — MECLIZINE HYDROCHLORIDE 25 MG: 25 TABLET ORAL at 08:24

## 2022-09-04 RX ADMIN — HYDROCODONE BITARTRATE AND ACETAMINOPHEN 2 TABLET: 10; 325 TABLET ORAL at 17:54

## 2022-09-04 RX ADMIN — HYDROMORPHONE HYDROCHLORIDE 1 MG: 1 INJECTION, SOLUTION INTRAMUSCULAR; INTRAVENOUS; SUBCUTANEOUS at 10:16

## 2022-09-04 RX ADMIN — TAZOBACTAM SODIUM AND PIPERACILLIN SODIUM 3.38 G: 375; 3 INJECTION, SOLUTION INTRAVENOUS at 06:14

## 2022-09-04 RX ADMIN — HYDROCODONE BITARTRATE AND ACETAMINOPHEN 2 TABLET: 10; 325 TABLET ORAL at 22:08

## 2022-09-04 RX ADMIN — LISINOPRIL: 20 TABLET ORAL at 08:24

## 2022-09-04 RX ADMIN — FAMOTIDINE 40 MG: 20 TABLET ORAL at 08:24

## 2022-09-04 RX ADMIN — HYDRALAZINE HYDROCHLORIDE 25 MG: 25 TABLET, FILM COATED ORAL at 08:24

## 2022-09-04 NOTE — PLAN OF CARE
Goal Outcome Evaluation:  Plan of Care Reviewed With: patient        Progress: improving  Outcome Evaluation: Pt required decreased assist with bed mobility and able to perform STS with cues to maintain WBing status. Pt requires cues several cues to scoot R hip onto bed, however able to complete with Herber. Increased pain noted at times and PT supported R LE during transtion in/out of bed. Pt would benefit from continued skilled PT to address the deficits and increase mobility and independence.    Patient was not wearing a face mask during this therapy encounter. Therapist used appropriate personal protective equipment including eye protection, mask, and gloves.  Mask used was standard procedure mask. Appropriate PPE was worn during the entire therapy session. Hand hygiene was completed before and after therapy session. Patient is not in enhanced droplet precautions.

## 2022-09-04 NOTE — PROGRESS NOTES
Hospital Follow Up    LOS:  LOS: 5 days   Patient Name: Fe Arevalo  Age/Sex: 72 y.o. female  : 1950  MRN: [id number]    Day of Service: 22   Length of Stay: 5  Encounter Provider: AFIA Mcneill  Place of Service: [facility] CARDIOLOGY  Patient Care Team:  Dion Curry MD as PCP - General  Carlos, Guillermo Epps MD as Consulting Physician (Cardiac Electrophysiology)    Subjective:     Chief Complaint: follow up atrial fibrillation    Interval History: No complaints of chest pain or SOA. Feels better today since receiving transfusion. Hgb 9    Objective:     Objective:  Temp:  [97.9 °F (36.6 °C)-99.3 °F (37.4 °C)] 98.9 °F (37.2 °C)  Heart Rate:  [71-89] 77  Resp:  [16-20] 18  BP: (118-164)/(55-82) 145/67     Intake/Output Summary (Last 24 hours) at 2022 1202  Last data filed at 2022 0823  Gross per 24 hour   Intake 2051.83 ml   Output 2200 ml   Net -148.17 ml     Body mass index is 32.78 kg/m².      22  1455 22  2317 22  1224   Weight: 101 kg (222 lb) 101 kg (222 lb) 101 kg (222 lb)     Weight change:     Physical Exam:   General Appearance:    Awake alert and oriented in no acute distress.   Color:  Skin:  Neuro:  HEENT:    Lungs:     Pink  Warm and dry  No focal, motor or sensory deficits  Neck supple, pupils equal, round and reactive. No JVD, No Bruit  Clear to auscultation,respirations regular, even and                  unlabored    Heart:    Irr/Irr, S1 and S2, 2/6 sys murmur, no gallop, no rub. No edema, DP/PT pulses are 2+   Chest Wall:    No abnormalities observed   Abdomen:     Normal bowel sounds, no masses, no organomegaly, soft        non-tender, non-distended, no guarding, no ascites noted   Extremities:   Moves all extremities well, no edema, no cyanosis, no redness       Lab Review:   Results from last 7 days   Lab Units 22  0844 22  0656   SODIUM mmol/L 134* 136   POTASSIUM mmol/L 3.9 4.2   CHLORIDE  mmol/L 101 102   CO2 mmol/L 23.0 27.1   BUN mg/dL 15 15   CREATININE mg/dL 1.14* 1.26*   GLUCOSE mg/dL 132* 100*   CALCIUM mg/dL 9.8 9.5     Results from last 7 days   Lab Units 08/31/22  0545 08/30/22  2246   TROPONIN T ng/mL <0.010 <0.010     Results from last 7 days   Lab Units 09/04/22  0844 09/03/22  0656   WBC 10*3/mm3 8.31 7.97   HEMOGLOBIN g/dL 9.0* 6.8*   HEMATOCRIT % 27.4* 21.6*   PLATELETS 10*3/mm3 397 407     Results from last 7 days   Lab Units 08/31/22  1608 08/31/22  0545 08/30/22  2246 08/30/22  1630   INR  2.25*  2.25* 3.60*   < > 5.76*   APTT seconds  --  63.3*  --  74.9*    < > = values in this interval not displayed.     Results from last 7 days   Lab Units 08/31/22  1608   MAGNESIUM mg/dL 1.8           Invalid input(s): LDLCALC          I reviewed the patient's new clinical results.  I personally viewed and interpreted the patient's EKG  Current Medications:   Scheduled Meds:famotidine, 40 mg, Oral, Daily  hydrALAZINE, 25 mg, Oral, BID  lisinopril-hydroCHLOROthiazide (ZESTORETIC) 20-12.5 mg combo dose, , Oral, Q24H  meclizine, 25 mg, Oral, Daily  metoprolol tartrate, 50 mg, Oral, BID  piperacillin-tazobactam, 3.375 g, Intravenous, Q8H  sodium chloride, 10 mL, Intravenous, Q12H      Continuous Infusions:lactated ringers, 9 mL/hr  sodium chloride, 100 mL/hr, Last Rate: 100 mL/hr (09/03/22 2110)        Allergies:  Allergies   Allergen Reactions   • Keflex [Cephalexin] Hives   • Pantoprazole Anaphylaxis and Angioedema   • Phytonadione [Vitamin K1] Shortness Of Breath and Palpitations     IV route   • Cephalosporins Hives   • Morphine And Related Other (See Comments) and Palpitations     Pt does not like the way it makes her feel.  Does not want it..     • Percocet [Oxycodone-Acetaminophen] Nausea Only   • Adhesive Tape Other (See Comments)     BLISTER   • Benadryl [Diphenhydramine] Palpitations   • Ciprofloxacin Rash   • Codeine Other (See Comments)     CHEST PAIN       Assessment:       Infected  prosthetic knee joint (HCC)    Chronic a-fib (HCC)    Hypertension    CKD (chronic kidney disease) stage 3, GFR 30-59 ml/min (HCC)    Chronic anticoagulation    Abnormal EKG    Supratherapeutic INR    Allergic reaction caused by a drug        Plan:   1. PAF: on warfarin for anticoagulation. Currently on hold. Will resume once ok with surgical team and hgb is stable  2. Supratherapeutic INR: received FFP  3. Prosthetic knee infection s/p explant and placement of antibiotic spacer on 8/31  4. Anemia due to acute blood loss: s/p transfusion. Hgb 9 today  5. HTN: BP stable on current regimen. No changes  6. Pulmonary hypertension: moderate to severe. No symptoms currently.      Patient reports feeling better after receiving transfusion. Concerned regarding not being on her anticoagulation for atrial fibrillation. If her hgb is stable tomorrow and ortho is ok, will resume anticoagulation at that time.    AFIA Mcneill  09/04/22  12:02 EDT  Electronically signed by AFIA Mcneill, 09/03/22, 12:36 PM EDT.

## 2022-09-04 NOTE — PLAN OF CARE
Goal Outcome Evaluation:      Patient is resting well at this time and did receive her ordered blood transfusions without any adverse reaction noted. VSS, she is taking her pain medication that has been effective and continues to use ice packs on her affected right knee as well. Pulses are palpable and capillary refill is <3 seconds in her toes bilaterally with feet warm, pink, and wnl.

## 2022-09-04 NOTE — PLAN OF CARE
Goal Outcome Evaluation:  Plan of Care Reviewed With: patient        Progress: improving  Outcome Evaluation: Pt AOx4. VSS. Pt in frequent pain throughout shift - medicated per MAR with minimal relief.  at bedside for entire shift and assisting in pt care. Nursing will CTM.

## 2022-09-04 NOTE — PROGRESS NOTES
Orthopedic Progress Note      Patient: Fe Arevalo  Date of Admission: 8/30/2022  YOB: 1950  Medical Record Number: [id number]    POD # :  4 Days Post-Op Procedure(s) (LRB):  RIGHT KNEE IMPLANT ANTIBIOTIC SPACER (Right)    Systemic or Specific Complaints: General weakness     Pain Relief: some relief    Physical Exam:  72 y.o.  female  Vitals:  Temp:  [97.9 °F (36.6 °C)-99.3 °F (37.4 °C)] 98.9 °F (37.2 °C)  Heart Rate:  [71-89] 77  Resp:  [16-20] 18  BP: (118-164)/(55-82) 145/67  alert and oriented  Chest: Clear to auscultation  CV: Regular Rate and Rhythm  Abd: Soft, NT, with BS +  Ext: NV intact. ROM appropriate. Calf is soft and nontender. Negative Homans sign  Skin: Incision clean dry and intact w/out signs or  symptoms of infection.    Activity: Mobilizing Per P.T.   Weight Bearing: TTWB RLE with knee immobilizer    Data Review     No results displayed because visit has over 200 results.          No results found.    Medications:  famotidine, 40 mg, Oral, Daily  hydrALAZINE, 25 mg, Oral, BID  lisinopril-hydroCHLOROthiazide (ZESTORETIC) 20-12.5 mg combo dose, , Oral, Q24H  meclizine, 25 mg, Oral, Daily  metoprolol tartrate, 50 mg, Oral, BID  piperacillin-tazobactam, 3.375 g, Intravenous, Q8H  sodium chloride, 10 mL, Intravenous, Q12H        acetaminophen    HYDROcodone-acetaminophen **OR** HYDROcodone-acetaminophen    HYDROmorphone **AND** naloxone    ondansetron    sodium chloride    Assessment:  Doing well POD  # 4 Days Post-Op Procedure(s) (LRB):  RIGHT KNEE IMPLANT ANTIBIOTIC SPACER (Right)  Problems Addressed this Visit    None       Visit Diagnoses       Infection of total knee replacement (HCC)        Relevant Orders    Anaerobic Culture - Tissue, Knee, Right (Completed)    Tissue / Bone Culture - Tissue, Knee, Right (Completed)          Diagnoses         Codes Comments    Infection of total knee replacement (HCC)     ICD-10-CM: T84.59XA, Z96.659  ICD-9-CM: 996.66, V43.65            Drain output - 50mL  Hemoglobin improved from 6.8 to 9.0 after transfusion of 2 units PRBC. Vitals are all stable at this point.      Plan:  Continue efforts to mobilize - TTWB RLE with knee immoblizer  Continue Pain Control Measures - Oral's   Continue incisional Care - pressing is C/D/I can charge in soiled  DVT prophylaxis     Discharge Plan: will defer to Dr. Stevan Yap, APRN    Date: 9/4/2022  Time: 10:45 EDT      I have reviewed and agree with the above.     Carlos Rodney MD

## 2022-09-04 NOTE — THERAPY TREATMENT NOTE
Patient Name: Fe Arevalo  : 1950    MRN: [id number]                              Today's Date: 2022       Admit Date: 2022    Visit Dx:     ICD-10-CM ICD-9-CM   1. Infection of total knee replacement (HCC)  T84.59XA 996.66    Z96.659 V43.65     Patient Active Problem List   Diagnosis   • Arthritis of ankle   • Wound dehiscence   • Pain and swelling of ankle   • Status post knee replacement   • Status post knee replacement, unspecified laterality   • Infected prosthetic knee joint (HCC)   • Chronic a-fib (HCC)   • Hypertension   • CKD (chronic kidney disease) stage 3, GFR 30-59 ml/min (HCC)   • Chronic anticoagulation   • Abnormal EKG   • Supratherapeutic INR   • Allergic reaction caused by a drug     Past Medical History:   Diagnosis Date   • A-fib (HCC)    • Histoplasmosis     STATES BILAT EYES:  INJECTIONS INTO HER EYES BILAT EVERY 7 WEEKS.  DENIES VISION LOSS   • History of blood clots 2021    PT STATES WAS DIAGNOSED WITH BLOOD CLOT BEHIND HER HEART AUG 2021-PT STATES THIS IS RESOLVED AND HAD CARDIAC ABLATION 2022   • Hypertension    • Lab test positive for detection of COVID-19 virus 2022   • Osteoarthritis of right knee    • Osteomyelitis (HCC)     MRSA RIGHT FOOT   • PONV (postoperative nausea and vomiting)    • Right knee pain     WEAKNESS AND LIMITED MOBILITY     Past Surgical History:   Procedure Laterality Date   • CARDIAC ABLATION  04/29/2022    X2.  PT HAS ATRICLIP DANISHA EXCLUSION SYSTEM IN PLACE THAT PT STATES IS NOT WORKING   • CHOLECYSTECTOMY     • COLONOSCOPY     • FOOT SURGERY Right     x7 DT OSTEOMYELITIS   • HYSTERECTOMY     • KNEE MINI REVISION Right 2022    Procedure: KNEE POLY INSERT EXCHANGE;  Surgeon: Nacho Hammonds II, MD;  Location: [facility] MAIN OR;  Service: Orthopedics;  Laterality: Right;   • KNEE MINI REVISION Right 2022    Procedure: RIGHT KNEE IMPLANT ANTIBIOTIC SPACER;  Surgeon: Nacho Hammonds II, MD;  Location:   [facility];  Service: Orthopedics;  Laterality: Right;   • TOTAL KNEE ARTHROPLASTY Right 7/15/2022    Procedure: TOTAL KNEE ARTHROPLASTY WITH ROBOT;  Surgeon: Nacho Hammonds II, MD;  Location:  [facility] [place];  Service: Robotics - Ortho;  Laterality: Right;   •  SHUNT INSERTION        General Information     Row Name 09/04/22 1313          Physical Therapy Time and Intention    Document Type therapy note (daily note)  -CN     Mode of Treatment physical therapy;individual therapy  -CN     Row Name 09/04/22 1313          General Information    Patient Profile Reviewed yes  -CN     Existing Precautions/Restrictions fall;non-weight bearing  TTWB R LE  -CN     Row Name 09/04/22 1313          Cognition    Orientation Status (Cognition) oriented x 3  -CN     Row Name 09/04/22 1313          Safety Issues, Functional Mobility    Impairments Affecting Function (Mobility) pain;strength;endurance/activity tolerance;range of motion (ROM)  -CN           User Key  (r) = Recorded By, (t) = Taken By, (c) = Cosigned By    Initials Name Provider Type    CN Grace Lewis, PT Physical Therapist               Mobility     Row Name 09/04/22 1314          Bed Mobility    Bed Mobility supine-sit;sit-supine  -CN     Supine-Sit [place] (Bed Mobility) verbal cues;nonverbal cues (demo/gesture);minimum assist (75% patient effort)  -CN     Sit-Supine [place] (Bed Mobility) verbal cues;nonverbal cues (demo/gesture);moderate assist (50% patient effort);minimum assist (75% patient effort)  -CN     Assistive Device (Bed Mobility) head of bed elevated;bed rails  -CN     Comment, (Bed Mobility) PT supporting R LE and max verbal cues for scooting in bed. Pt with difficulty shifting R hip back onto bed upon return so sitting  -CN     Row Name 09/04/22 1314          Sit-Stand Transfer    Sit-Stand [place] (Transfers) moderate assist (50% patient effort);verbal cues  -CN     Assistive Device (Sit-Stand Transfers) walker,  front-wheeled  -CN     Comment, (Sit-Stand Transfer) Cues for hand placement and maintaining WBing status; difficulty attaining erect posture, however able to clear hips from bed  -CN     Row Name 09/04/22 1314          Gait/Stairs (Locomotion)    [place] Level (Gait) not tested  -CN     Comment, (Gait/Stairs) Did not attempt ambulation per likely unable to maintain WBing status  -CN     Row Name 09/04/22 1314          Mobility    Extremity Weight-bearing Status right lower extremity  -CN     Right Lower Extremity (Weight-bearing Status) toe touch weight-bearing (TTWB)   -CN           User Key  (r) = Recorded By, (t) = Taken By, (c) = Cosigned By    Initials Name Provider Type    CN Grace Lewis, PT Physical Therapist               Obj/Interventions    No documentation.                Goals/Plan    No documentation.                Clinical Impression     Row Name 09/04/22 1316          Pain    Pretreatment Pain Rating 9/10  -CN     Posttreatment Pain Rating 9/10  -CN     Pain Location - Side/Orientation Right  -CN     Pain Location lower  -CN     Pain Location - extremity  -CN     Pain Intervention(s) Medication (See MAR);Repositioned;Ambulation/increased activity;Cold applied;Elevated  -CN     Row Name 09/04/22 1316          Plan of Care Review    Plan of Care Reviewed With patient  -CN     Progress improving  -CN     Outcome Evaluation Pt required decreased assist with bed mobility and able to perform STS with cues to maintain WBing status. Pt requires cues several cues to scoot R hip onto bed, however able to complete with Herber. Increased pain noted at times and PT supported R LE during transtion in/out of bed. Pt would benefit from continued skilled PT to address the deficits and increase mobility and independence.  -CN     Row Name 09/04/22 1316          Positioning and Restraints    Pre-Treatment Position in bed  -CN     Post Treatment Position bed  -CN     In Bed fowlers;call light within  reach;encouraged to call for assist;exit alarm on;RLE elevated;LLE elevated  -CN           User Key  (r) = Recorded By, (t) = Taken By, (c) = Cosigned By    Initials Name Provider Type    Grace Ruiz, PT Physical Therapist               Outcome Measures     Row Name 09/04/22 1319 09/04/22 0823       How much help from another person do you currently need...    Turning from your back to your side while in flat bed without using bedrails? 3  -CN 3  -PM    Moving from lying on back to sitting on the side of a flat bed without bedrails? 3  -CN 3  -PM    Moving to and from a bed to a chair (including a wheelchair)? 2  -CN 1  -PM    Standing up from a chair using your arms (e.g., wheelchair, bedside chair)? 2  -CN 1  -PM    Climbing 3-5 steps with a railing? 1  -CN 1  -PM    To walk in hospital room? 1  -CN 1  -PM    AM-PAC 6 Clicks Score (PT) 12  -CN 10  -PM    Highest level of mobility 4 --> Transferred to chair/commode  -CN 4 --> Transferred to chair/commode  -PM    Row Name 09/04/22 1319          Functional Assessment    Outcome Measure Options AM-PAC 6 Clicks Basic Mobility (PT)  -CN           User Key  (r) = Recorded By, (t) = Taken By, (c) = Cosigned By    Initials Name Provider Type    Grace Ruiz, PT Physical Therapist    PM Dashawn Alaniz, RN Registered Nurse                             Physical Therapy Education                 Title: PT OT SLP Therapies (In Progress)     Topic: Physical Therapy (In Progress)     Point: Mobility training (Done)     Learning Progress Summary           Patient Acceptance, E, VU,NR by CN at 9/4/2022 1319    Acceptance, E,TB,D, VU,NR by CHARLENE at 9/2/2022 1225                   Point: Home exercise program (Not Started)     Learner Progress:  Not documented in this visit.          Point: Body mechanics (Not Started)     Learner Progress:  Not documented in this visit.          Point: Precautions (Not Started)     Learner Progress:  Not documented in  this visit.                      User Key     Initials Effective Dates Name Provider Type Discipline    EJ 06/16/21 -  Palmira Torres PT Physical Therapist PT    CN 06/16/21 -  Grace Lewis PT Physical Therapist PT              PT Recommendation and Plan     Plan of Care Reviewed With: patient  Progress: improving  Outcome Evaluation: Pt required decreased assist with bed mobility and able to perform STS with cues to maintain WBing status. Pt requires cues several cues to scoot R hip onto bed, however able to complete with Herber. Increased pain noted at times and PT supported R LE during transtion in/out of bed. Pt would benefit from continued skilled PT to address the deficits and increase mobility and independence.     Time Calculation:    PT Charges     Row Name 09/04/22 1319             Time Calculation    Start Time 1002  -CN      Stop Time 1027  -CN      Time Calculation (min) 25 min  -CN      PT Received On 09/04/22  -CN      PT - Next Appointment 09/05/22  -CN              Timed Charges    87517 - PT Therapeutic Activity Minutes 25  -CN              Total Minutes    Timed Charges Total Minutes 25  -CN       Total Minutes 25  -CN            User Key  (r) = Recorded By, (t) = Taken By, (c) = Cosigned By    Initials Name Provider Type    CN Grace Lewis, SUNIL Physical Therapist              Therapy Charges for Today     Code Description Service Date Service Provider Modifiers Qty    [id number]  PT THERAPEUTIC ACT EA 15 MIN 9/4/2022 Grace Lewis, SUNIL GP 2          PT G-Codes  Outcome Measure Options: AM-PAC 6 Clicks Basic Mobility (PT)  AM-PAC 6 Clicks Score (PT): 12    Grace Lewis PT  9/4/2022

## 2022-09-05 LAB
ANION GAP SERPL CALCULATED.3IONS-SCNC: 8.3 MMOL/L (ref 5–15)
BACTERIA SPEC ANAEROBE CULT: NORMAL
BASOPHILS # BLD AUTO: 0.03 10*3/MM3 (ref 0–0.2)
BASOPHILS NFR BLD AUTO: 0.4 % (ref 0–1.5)
BUN SERPL-MCNC: 13 MG/DL (ref 8–23)
BUN/CREAT SERPL: 13.3 (ref 7–25)
CALCIUM SPEC-SCNC: 9.7 MG/DL (ref 8.6–10.5)
CHLORIDE SERPL-SCNC: 103 MMOL/L (ref 98–107)
CO2 SERPL-SCNC: 24.7 MMOL/L (ref 22–29)
CREAT SERPL-MCNC: 0.98 MG/DL (ref 0.57–1)
DEPRECATED RDW RBC AUTO: 47.5 FL (ref 37–54)
EGFRCR SERPLBLD CKD-EPI 2021: 62.2 ML/MIN/1.73
EOSINOPHIL # BLD AUTO: 0.35 10*3/MM3 (ref 0–0.4)
EOSINOPHIL NFR BLD AUTO: 4.7 % (ref 0.3–6.2)
ERYTHROCYTE [DISTWIDTH] IN BLOOD BY AUTOMATED COUNT: 14.5 % (ref 12.3–15.4)
GLUCOSE SERPL-MCNC: 99 MG/DL (ref 65–99)
HCT VFR BLD AUTO: 25.9 % (ref 34–46.6)
HGB BLD-MCNC: 8.4 G/DL (ref 12–15.9)
IMM GRANULOCYTES # BLD AUTO: 0.09 10*3/MM3 (ref 0–0.05)
IMM GRANULOCYTES NFR BLD AUTO: 1.2 % (ref 0–0.5)
LYMPHOCYTES # BLD AUTO: 0.8 10*3/MM3 (ref 0.7–3.1)
LYMPHOCYTES NFR BLD AUTO: 10.7 % (ref 19.6–45.3)
MCH RBC QN AUTO: 29.5 PG (ref 26.6–33)
MCHC RBC AUTO-ENTMCNC: 32.4 G/DL (ref 31.5–35.7)
MCV RBC AUTO: 90.9 FL (ref 79–97)
MONOCYTES # BLD AUTO: 0.88 10*3/MM3 (ref 0.1–0.9)
MONOCYTES NFR BLD AUTO: 11.8 % (ref 5–12)
NEUTROPHILS NFR BLD AUTO: 5.3 10*3/MM3 (ref 1.7–7)
NEUTROPHILS NFR BLD AUTO: 71.2 % (ref 42.7–76)
NRBC BLD AUTO-RTO: 0 /100 WBC (ref 0–0.2)
PLATELET # BLD AUTO: 413 10*3/MM3 (ref 140–450)
PMV BLD AUTO: 9.4 FL (ref 6–12)
POTASSIUM SERPL-SCNC: 4.1 MMOL/L (ref 3.5–5.2)
RBC # BLD AUTO: 2.85 10*6/MM3 (ref 3.77–5.28)
SODIUM SERPL-SCNC: 136 MMOL/L (ref 136–145)
WBC NRBC COR # BLD: 7.45 10*3/MM3 (ref 3.4–10.8)

## 2022-09-05 PROCEDURE — 25010000002 PIPERACILLIN SOD-TAZOBACTAM PER 1 G: Performed by: ORTHOPAEDIC SURGERY

## 2022-09-05 PROCEDURE — 25010000002 HYDROMORPHONE 1 MG/ML SOLUTION: Performed by: ORTHOPAEDIC SURGERY

## 2022-09-05 PROCEDURE — 80048 BASIC METABOLIC PNL TOTAL CA: CPT | Performed by: ORTHOPAEDIC SURGERY

## 2022-09-05 PROCEDURE — 99232 SBSQ HOSP IP/OBS MODERATE 35: CPT | Performed by: INTERNAL MEDICINE

## 2022-09-05 PROCEDURE — 97530 THERAPEUTIC ACTIVITIES: CPT

## 2022-09-05 PROCEDURE — 85025 COMPLETE CBC W/AUTO DIFF WBC: CPT | Performed by: ORTHOPAEDIC SURGERY

## 2022-09-05 RX ADMIN — HYDROCODONE BITARTRATE AND ACETAMINOPHEN 2 TABLET: 10; 325 TABLET ORAL at 02:03

## 2022-09-05 RX ADMIN — HYDROCODONE BITARTRATE AND ACETAMINOPHEN 2 TABLET: 10; 325 TABLET ORAL at 09:16

## 2022-09-05 RX ADMIN — LISINOPRIL: 20 TABLET ORAL at 09:04

## 2022-09-05 RX ADMIN — HYDROMORPHONE HYDROCHLORIDE 1 MG: 1 INJECTION, SOLUTION INTRAMUSCULAR; INTRAVENOUS; SUBCUTANEOUS at 11:36

## 2022-09-05 RX ADMIN — HYDROCODONE BITARTRATE AND ACETAMINOPHEN 2 TABLET: 10; 325 TABLET ORAL at 20:30

## 2022-09-05 RX ADMIN — MECLIZINE HYDROCHLORIDE 25 MG: 25 TABLET ORAL at 09:04

## 2022-09-05 RX ADMIN — HYDROMORPHONE HYDROCHLORIDE 1 MG: 1 INJECTION, SOLUTION INTRAMUSCULAR; INTRAVENOUS; SUBCUTANEOUS at 00:00

## 2022-09-05 RX ADMIN — METOPROLOL TARTRATE 50 MG: 50 TABLET, FILM COATED ORAL at 20:30

## 2022-09-05 RX ADMIN — HYDROMORPHONE HYDROCHLORIDE 1 MG: 1 INJECTION, SOLUTION INTRAMUSCULAR; INTRAVENOUS; SUBCUTANEOUS at 06:33

## 2022-09-05 RX ADMIN — TAZOBACTAM SODIUM AND PIPERACILLIN SODIUM 3.38 G: 375; 3 INJECTION, SOLUTION INTRAVENOUS at 22:05

## 2022-09-05 RX ADMIN — HYDROCODONE BITARTRATE AND ACETAMINOPHEN 2 TABLET: 10; 325 TABLET ORAL at 13:40

## 2022-09-05 RX ADMIN — SODIUM CHLORIDE 100 ML/HR: 9 INJECTION, SOLUTION INTRAVENOUS at 13:01

## 2022-09-05 RX ADMIN — HYDROMORPHONE HYDROCHLORIDE 1 MG: 1 INJECTION, SOLUTION INTRAMUSCULAR; INTRAVENOUS; SUBCUTANEOUS at 22:05

## 2022-09-05 RX ADMIN — TAZOBACTAM SODIUM AND PIPERACILLIN SODIUM 3.38 G: 375; 3 INJECTION, SOLUTION INTRAVENOUS at 13:19

## 2022-09-05 RX ADMIN — HYDRALAZINE HYDROCHLORIDE 25 MG: 25 TABLET, FILM COATED ORAL at 09:04

## 2022-09-05 RX ADMIN — Medication 10 ML: at 09:05

## 2022-09-05 RX ADMIN — METOPROLOL TARTRATE 50 MG: 50 TABLET, FILM COATED ORAL at 09:16

## 2022-09-05 RX ADMIN — TAZOBACTAM SODIUM AND PIPERACILLIN SODIUM 3.38 G: 375; 3 INJECTION, SOLUTION INTRAVENOUS at 06:33

## 2022-09-05 RX ADMIN — FAMOTIDINE 40 MG: 20 TABLET ORAL at 09:05

## 2022-09-05 RX ADMIN — Medication 10 ML: at 22:05

## 2022-09-05 RX ADMIN — HYDRALAZINE HYDROCHLORIDE 25 MG: 25 TABLET, FILM COATED ORAL at 20:30

## 2022-09-05 RX ADMIN — HYDROMORPHONE HYDROCHLORIDE 1 MG: 1 INJECTION, SOLUTION INTRAMUSCULAR; INTRAVENOUS; SUBCUTANEOUS at 18:42

## 2022-09-05 NOTE — PLAN OF CARE
Goal Outcome Evaluation:               Pt Resting at this time pain free,A/O X4 VSS .Afib on the monitor rate controlled. at bedside.

## 2022-09-05 NOTE — PROGRESS NOTES
Operative cultures with Providencia rettgeri and Enterococcus faecalis.  Final antibiotic recommendations remain unchanged.    Final antibiotic recommendations  1.  Zosyn 3.375 g IV every 8 hours x6 weeks.  Antibiotic stop date October 12     2.  Please monitor weekly CBC with differential and creatinine and fax the results to the infectious disease clinic at [phone]     3.  We will arrange for ID follow-up on October 7

## 2022-09-05 NOTE — PROGRESS NOTES
Continue to trend hemoglobin.  Incision this morning looked well-healing.  30 cc out of drain.  Continue nonweightbearing to the left lower extremity.  Plan discharge to rehab, likely tomorrow.  Plan is 6 weeks IV Zosyn

## 2022-09-05 NOTE — THERAPY TREATMENT NOTE
Patient Name: Fe Arevalo  : 1950    MRN: [id number]                              Today's Date: 2022       Admit Date: 2022    Visit Dx:     ICD-10-CM ICD-9-CM   1. Infection of total knee replacement (HCC)  T84.59XA 996.66    Z96.659 V43.65     Patient Active Problem List   Diagnosis   • Arthritis of ankle   • Wound dehiscence   • Pain and swelling of ankle   • Status post knee replacement   • Status post knee replacement, unspecified laterality   • Infected prosthetic knee joint (HCC)   • Chronic a-fib (HCC)   • Hypertension   • CKD (chronic kidney disease) stage 3, GFR 30-59 ml/min (HCC)   • Chronic anticoagulation   • Abnormal EKG   • Supratherapeutic INR   • Allergic reaction caused by a drug     Past Medical History:   Diagnosis Date   • A-fib (HCC)    • Histoplasmosis     STATES BILAT EYES:  INJECTIONS INTO HER EYES BILAT EVERY 7 WEEKS.  DENIES VISION LOSS   • History of blood clots 2021    PT STATES WAS DIAGNOSED WITH BLOOD CLOT BEHIND HER HEART AUG 2021-PT STATES THIS IS RESOLVED AND HAD CARDIAC ABLATION 2022   • Hypertension    • Lab test positive for detection of COVID-19 virus 2022   • Osteoarthritis of right knee    • Osteomyelitis (HCC)     MRSA RIGHT FOOT   • PONV (postoperative nausea and vomiting)    • Right knee pain     WEAKNESS AND LIMITED MOBILITY     Past Surgical History:   Procedure Laterality Date   • CARDIAC ABLATION  04/29/2022    X2.  PT HAS ATRICLIP DANISHA EXCLUSION SYSTEM IN PLACE THAT PT STATES IS NOT WORKING   • CHOLECYSTECTOMY     • COLONOSCOPY     • FOOT SURGERY Right     x7 DT OSTEOMYELITIS   • HYSTERECTOMY     • KNEE MINI REVISION Right 2022    Procedure: KNEE POLY INSERT EXCHANGE;  Surgeon: Nacho Hammonds II, MD;  Location[place] [facility];  Service: Orthopedics;  Laterality: Right;   • KNEE MINI REVISION Right 2022    Procedure: RIGHT KNEE IMPLANT ANTIBIOTIC SPACER;  Surgeon: Nacho Hammonds II, MD;  Location:   [facility];  Service: Orthopedics;  Laterality: Right;   • TOTAL KNEE ARTHROPLASTY Right 7/15/2022    Procedure: TOTAL KNEE ARTHROPLASTY WITH ROBOT;  Surgeon: Nacho Hammonds II, MD;  Location[place]  [facility];  Service: Robotics - Ortho;  Laterality: Right;   •  SHUNT INSERTION        General Information     Row Name 09/05/22 1356          Physical Therapy Time and Intention    Document Type therapy note (daily note)  -     Mode of Treatment physical therapy;individual therapy  -     Row Name 09/05/22 1356          General Information    Patient Profile Reviewed yes  -     Existing Precautions/Restrictions fall;non-weight bearing  TTWB R LE  -     Row Name 09/05/22 [facility]6          Cognition    Orientation Status (Cognition) oriented x 3  -     Row Name 09/05/22 [facility]6          Safety Issues, Functional Mobility    Impairments Affecting Function (Mobility) pain;strength;endurance/activity tolerance;range of motion (ROM);balance  -           User Key  (r) = Recorded By, (t) = Taken By, (c) = Cosigned By    Initials Name Provider Type     Norma Junior PT Physical Therapist               Mobility     Row Name 09/05/22 [facility]6          Bed Mobility    Bed Mobility supine-sit  -     Supine-Sit [place] (Bed Mobility) verbal cues;nonverbal cues (demo/gesture);minimum assist (75% patient effort)  -     Assistive Device (Bed Mobility) head of bed elevated;bed rails  -     Comment, (Bed Mobility) assist with donning/securing KI on R LE and bringing LE out of bed  -     Row Name 09/05/22 [facility]6          Bed-Chair Transfer    Bed-Chair [place] (Transfers) moderate assist (50% patient effort);2 person assist;verbal cues  -     Assistive Device (Bed-Chair Transfers) walker, front-wheeled  -     Comment, (Bed-Chair Transfer) transfer to [facility] next to bed; cueing for proper weight shift, cues on rwx to turn and bring L LE back, cueing to maintain TTWB R LE with R LE remaining extended out  front  -     Row Name 09/05/22 2841          Sit-Stand Transfer    Sit-Stand [place] (Transfers) moderate assist (50% patient effort);verbal cues;2 person assist  -     Assistive Device (Sit-Stand Transfers) walker, front-wheeled  -     Comment, (Sit-Stand Transfer) therapist with foot at pt. R LE to ensure TTWB status; able to stand maintaining weight bearing status  -[facility] Name 09/05/22 7021          Gait/Stairs (Locomotion)    [place] Level (Gait) not tested  -           User Key  (r) = Recorded By, (t) = Taken By, (c) = Cosigned By    Initials Name Provider Type     Norma Junior, PT Physical Therapist               Obj/Interventions    No documentation.                Goals/Plan    No documentation.                Clinical Impression     [facility] Name 09/05/22 9549          Pain    Additional Documentation Pain Scale: FACES Pre/Post-Treatment (Group)  -[facility] Name 09/05/22 6832          Pain Scale: FACES Pre/Post-Treatment    Pain: FACES Scale, Pretreatment 8-->hurts whole lot  -     Posttreatment Pain Rating 8-->hurts whole lot  -     Pain Location - Side/Orientation Right  -     Pain Location - knee  -[facility] Name 09/05/22 5280          Plan of Care Review    Plan of Care Reviewed With patient  -     Outcome Evaluation Pt. agreeable to PT this date; hopeful to get up to BSC. Required Herber for bed mobility including assist to don/tighten KI on R LE and bring LE out of bed. Able to maintain TTWB status on R LE during STS, difficulty with proper weight shift but able to turn with assist and cueing at walker/hips to pivot transfer toward R to BSC. Pt. able to clear hips from BSC several times to doff brief. Discussed with pt. and family as well as nursing staff on proper transfer back and should be easier as pt. will be transferring back to L side. Pt. given call light and educated to call staff for assist back to bed.  -[facility] Name 09/05/22 5735          Therapy  Assessment/Plan (PT)    Rehab Potential (PT) fair, will monitor progress closely  -     Criteria for Skilled Interventions Met (PT) yes  -MH     Therapy Frequency (PT) 6 times/wk  -     Row Name 09/05/22 1358          Positioning and Restraints    Pre-Treatment Position in bed  -MH     Post Treatment Position bsc  -MH     On BS commode notified nsg;sitting;call light within reach;encouraged to call for assist;with family/caregiver  -           User Key  (r) = Recorded By, (t) = Taken By, (c) = Cosigned By    Initials Name Provider Type     Norma Junior, SUNIL Physical Therapist               Outcome Measures     Row Name 09/05/22 1403          How much help from another person do you currently need...    Turning from your back to your side while in flat bed without using bedrails? 3  -MH     Moving from lying on back to sitting on the side of a flat bed without bedrails? 3  -MH     Moving to and from a bed to a chair (including a wheelchair)? 2  -MH     Standing up from a chair using your arms (e.g., wheelchair, bedside chair)? 2  -MH     Climbing 3-5 steps with a railing? 1  -MH     To walk in hospital room? 1  -     AM-PAC 6 Clicks Score (PT) 12  -     Highest level of mobility 4 --> Transferred to chair/commode  -     Row Name 09/05/22 1403          Functional Assessment    Outcome Measure Options AM-PAC 6 Clicks Basic Mobility (PT)  -           User Key  (r) = Recorded By, (t) = Taken By, (c) = Cosigned By    Initials Name Provider Type    Norma Lopez, SUNIL Physical Therapist                             Physical Therapy Education                 Title: PT OT SLP Therapies (Done)     Topic: Physical Therapy (Done)     Point: Mobility training (Done)     Learning Progress Summary           Patient Acceptance, E,TB,D, VU,DU,NR by  at 9/5/2022 1403    Acceptance, E,TB, VU by JS at 9/5/2022 0917    Acceptance, TB,E, VU by PM at 9/4/2022 1729    Acceptance, E, VU,NR by CN at 9/4/2022 1319     Acceptance, E,TB,D, VU,NR by  at 9/2/2022 1225                   Point: Home exercise program (Done)     Learning Progress Summary           Patient Acceptance, E,TB, VU by JS at 9/5/2022 0917    Acceptance, TB,E, VU by PM at 9/4/2022 1729                   Point: Body mechanics (Done)     Learning Progress Summary           Patient Acceptance, E,TB, VU by JS at 9/5/2022 0917    Acceptance, TB,E, VU by PM at 9/4/2022 1729                   Point: Precautions (Done)     Learning Progress Summary           Patient Acceptance, E,TB,D, VU,DU,NR by  at 9/5/2022 1403    Acceptance, E,TB, VU by JS at 9/5/2022 0917    Acceptance, TB,E, VU by PM at 9/4/2022 1729                               User Key     Initials Effective Dates Name Provider Type Discipline     06/16/21 -  Palmira Torres, PT Physical Therapist PT     06/16/21 -  Grace Lewis, PT Physical Therapist PT     06/16/21 -  Suhail Coy, RN Registered Nurse Nurse    PM 06/07/21 -  Dashawn Alaniz, RN Registered Nurse Nurse     05/26/20 -  Norma Junior, SUNIL Physical Therapist PT              PT Recommendation and Plan     Plan of Care Reviewed With: patient  Outcome Evaluation: Pt. agreeable to PT this date; hopeful to get up to BSC. Required Herber for bed mobility including assist to don/tighten KI on R LE and bring LE out of bed. Able to maintain TTWB status on R LE during STS, difficulty with proper weight shift but able to turn with assist and cueing at walker/hips to pivot transfer toward R to BSC. Pt. able to clear hips from BSC several times to doff brief. Discussed with pt. and family as well as nursing staff on proper transfer back and should be easier as pt. will be transferring back to L side. Pt. given call light and educated to call staff for assist back to bed.     Time Calculation:    PT Charges     Row Name 09/05/22 1405             Time Calculation    Start Time 1156  -MH      Stop Time 1215  -MH      Time Calculation  (min) 19 min  -      PT Received On 09/05/22  -      PT - Next Appointment 09/06/22  -              Time Calculation- PT    Total Timed Code Minutes- PT 15 minute(s)  -              Timed Charges    15475 - PT Therapeutic Activity Minutes 15  -MH              Total Minutes    Timed Charges Total Minutes 15  -MH       Total Minutes 15  -MH            User Key  (r) = Recorded By, (t) = Taken By, (c) = Cosigned By    Initials Name Provider Type     Norma Junior, PT Physical Therapist              Therapy Charges for Today     Code Description Service Date Service Provider Modifiers Qty    [id number]  PT THERAPEUTIC ACT EA 15 MIN 9/5/2022 Norma Junior, PT GP 1          PT G-Codes  Outcome Measure Options: AM-PAC 6 Clicks Basic Mobility (PT)  AM-PAC 6 Clicks Score (PT): 12    Norma Junior PT  9/5/2022

## 2022-09-05 NOTE — PROGRESS NOTES
"CC: Persistent atrial fibrillation with RVR    Interval History: No new acute events overnight      Vital Signs  Temp:  [97 °F (36.1 °C)-98.5 °F (36.9 °C)] 97 °F (36.1 °C)  Heart Rate:  [] 103  Resp:  [18] 18  BP: (132-144)/(54-70) 144/70    Intake/Output Summary (Last 24 hours) at 9/5/2022 1216  Last data filed at 9/5/2022 0633  Gross per 24 hour   Intake 410 ml   Output 2030 ml   Net -1620 ml     Flowsheet Rows    Flowsheet Row First Filed Value   Admission Height 175.3 cm (69\") Documented at 08/30/2022 2317   Admission Weight 101 kg (222 lb) Documented at 08/30/2022 1455          PHYSICAL EXAM:  General: No acute distress  Resp:NL Rate, symmetric chest expansion,unlabored, clear  CV: Irregularly irregular, NL PMI, NL S1 and S2, no Murmur, no gallop, no rub, No JVD.   ABD:Nl sounds, no masses or tenderness, nondistended, no guarding or rebound  Neuro: alert,cooperative and oriented  Extr: Limited range of motion of right lower extremity with knee wrapping.      Results Review:    Results from last 7 days   Lab Units 09/05/22  0742   SODIUM mmol/L 136   POTASSIUM mmol/L 4.1   CHLORIDE mmol/L 103   CO2 mmol/L 24.7   BUN mg/dL 13   CREATININE mg/dL 0.98   GLUCOSE mg/dL 99   CALCIUM mg/dL 9.7     Results from last 7 days   Lab Units 08/31/22  0545 08/30/22  2246   TROPONIN T ng/mL <0.010 <0.010     Results from last 7 days   Lab Units 09/05/22  0742   WBC 10*3/mm3 7.45   HEMOGLOBIN g/dL 8.4*   HEMATOCRIT % 25.9*   PLATELETS 10*3/mm3 413     Results from last 7 days   Lab Units 08/31/22  1608 08/31/22  0545 08/30/22  2246 08/30/22  1630   INR  2.25*  2.25* 3.60* 4.41* 5.76*   APTT seconds  --  63.3*  --  74.9*         Results from last 7 days   Lab Units 08/31/22  1608   MAGNESIUM mg/dL 1.8         I reviewed the patient's new clinical results.  I personally viewed and interpreted the patient's EKG/Telemetry data        Medication Review:   Meds reviewed    lactated ringers, 9 mL/hr  sodium chloride, 100 mL/hr, " Last Rate: 100 mL/hr (09/04/22 1934)        Assessment/Plan    1.  Atrial fibrillation with rapid ventricular response-good heart rate control  2.  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  Anemia of acute blood loss  4.  Patient complaining of unable to dorsiflex right foot-nurse aware and will contact Dr Hammonds  5.  Prior history of pulmonary hypertension      Patient is hemodynamically stable from cardiac standpoint  Resume anticoagulation when okay with orthopedics  Continue current cardiac medications    Cardiology will sign off but call with any questions.  Follow-up with her cardiologist 4 weeks after discharge.  Thank you for consulting with cardiology          Garrett Franco MD  09/05/22  12:16 EDT

## 2022-09-05 NOTE — PLAN OF CARE
Goal Outcome Evaluation:  Plan of Care Reviewed With: patient        Progress: no change  Outcome Evaluation: Island dressing to r knee applied per Dr. Hammonds's instructions , noted one area with minimal serosanguinous drain , about noon ,noted island dressing posterior part of dressing with mod amount of drainage , reinforced with 2 abd pads and secured by brace, collection drain intact, ,15cc out,. PT transferred pt to bsc x2 assist ,did well ,nwb r knee transfer, pt had mod soft bm, assisted back to bed x2 assist, ximena fair. Pt medicated with Dilaudid inj and alternate with po Norco, PRN effective. Remians aifb on monitor, rate controlled in 70's, vss, IVF d/c per Dr. Armendariz. nad, vss. R knee elevated on pillow, with ice pack applied.  at bsd.Cont with Zosyn infusion, no adverse effects.

## 2022-09-05 NOTE — PLAN OF CARE
Goal Outcome Evaluation:  Plan of Care Reviewed With: patient           Outcome Evaluation: Pt. agreeable to PT this date; hopeful to get up to BSC. Required Herber for bed mobility including assist to don/tighten KI on R LE and bring LE out of bed. Able to maintain TTWB status on R LE during STS, difficulty with proper weight shift but able to turn with assist and cueing at walker/hips to pivot transfer toward R to BSC. Pt. able to clear hips from BSC several times to doff brief. Discussed with pt. and family as well as nursing staff on proper transfer back and should be easier as pt. will be transferring back to L side. Pt. given call light and educated to call staff for assist back to bed.    Patient was wearing a face mask during this therapy encounter. Therapist used appropriate personal protective equipment including eye protection, mask, and gloves.  Mask used was standard procedure mask. Appropriate PPE was worn during the entire therapy session. Hand hygiene was completed before and after therapy session. Patient is not in enhanced droplet precautions.

## 2022-09-06 LAB
ANION GAP SERPL CALCULATED.3IONS-SCNC: 9.6 MMOL/L (ref 5–15)
BASOPHILS # BLD AUTO: 0.02 10*3/MM3 (ref 0–0.2)
BASOPHILS NFR BLD AUTO: 0.3 % (ref 0–1.5)
BUN SERPL-MCNC: 13 MG/DL (ref 8–23)
BUN/CREAT SERPL: 11.6 (ref 7–25)
CALCIUM SPEC-SCNC: 9.6 MG/DL (ref 8.6–10.5)
CHLORIDE SERPL-SCNC: 103 MMOL/L (ref 98–107)
CO2 SERPL-SCNC: 25.4 MMOL/L (ref 22–29)
CREAT SERPL-MCNC: 1.12 MG/DL (ref 0.57–1)
DEPRECATED RDW RBC AUTO: 44 FL (ref 37–54)
EGFRCR SERPLBLD CKD-EPI 2021: 53 ML/MIN/1.73
EOSINOPHIL # BLD AUTO: 0.28 10*3/MM3 (ref 0–0.4)
EOSINOPHIL NFR BLD AUTO: 4.1 % (ref 0.3–6.2)
ERYTHROCYTE [DISTWIDTH] IN BLOOD BY AUTOMATED COUNT: 14.1 % (ref 12.3–15.4)
GLUCOSE SERPL-MCNC: 101 MG/DL (ref 65–99)
HCT VFR BLD AUTO: 24.4 % (ref 34–46.6)
HGB BLD-MCNC: 7.9 G/DL (ref 12–15.9)
IMM GRANULOCYTES # BLD AUTO: 0.08 10*3/MM3 (ref 0–0.05)
IMM GRANULOCYTES NFR BLD AUTO: 1.2 % (ref 0–0.5)
LYMPHOCYTES # BLD AUTO: 0.74 10*3/MM3 (ref 0.7–3.1)
LYMPHOCYTES NFR BLD AUTO: 10.8 % (ref 19.6–45.3)
MCH RBC QN AUTO: 28.1 PG (ref 26.6–33)
MCHC RBC AUTO-ENTMCNC: 32.4 G/DL (ref 31.5–35.7)
MCV RBC AUTO: 86.8 FL (ref 79–97)
MONOCYTES # BLD AUTO: 0.73 10*3/MM3 (ref 0.1–0.9)
MONOCYTES NFR BLD AUTO: 10.7 % (ref 5–12)
NEUTROPHILS NFR BLD AUTO: 4.99 10*3/MM3 (ref 1.7–7)
NEUTROPHILS NFR BLD AUTO: 72.9 % (ref 42.7–76)
NRBC BLD AUTO-RTO: 0.1 /100 WBC (ref 0–0.2)
PLATELET # BLD AUTO: 384 10*3/MM3 (ref 140–450)
PMV BLD AUTO: 9.6 FL (ref 6–12)
POTASSIUM SERPL-SCNC: 3.9 MMOL/L (ref 3.5–5.2)
RBC # BLD AUTO: 2.81 10*6/MM3 (ref 3.77–5.28)
SODIUM SERPL-SCNC: 138 MMOL/L (ref 136–145)
WBC NRBC COR # BLD: 6.84 10*3/MM3 (ref 3.4–10.8)

## 2022-09-06 PROCEDURE — 85025 COMPLETE CBC W/AUTO DIFF WBC: CPT | Performed by: ORTHOPAEDIC SURGERY

## 2022-09-06 PROCEDURE — 25010000002 PIPERACILLIN SOD-TAZOBACTAM PER 1 G: Performed by: ORTHOPAEDIC SURGERY

## 2022-09-06 PROCEDURE — 25010000002 HYDROMORPHONE 1 MG/ML SOLUTION: Performed by: ORTHOPAEDIC SURGERY

## 2022-09-06 PROCEDURE — 80048 BASIC METABOLIC PNL TOTAL CA: CPT | Performed by: ORTHOPAEDIC SURGERY

## 2022-09-06 RX ADMIN — HYDROCODONE BITARTRATE AND ACETAMINOPHEN 2 TABLET: 10; 325 TABLET ORAL at 20:19

## 2022-09-06 RX ADMIN — Medication 10 ML: at 20:20

## 2022-09-06 RX ADMIN — METOPROLOL TARTRATE 50 MG: 50 TABLET, FILM COATED ORAL at 08:09

## 2022-09-06 RX ADMIN — TAZOBACTAM SODIUM AND PIPERACILLIN SODIUM 3.38 G: 375; 3 INJECTION, SOLUTION INTRAVENOUS at 13:01

## 2022-09-06 RX ADMIN — HYDRALAZINE HYDROCHLORIDE 25 MG: 25 TABLET, FILM COATED ORAL at 20:19

## 2022-09-06 RX ADMIN — HYDROCODONE BITARTRATE AND ACETAMINOPHEN 2 TABLET: 10; 325 TABLET ORAL at 10:59

## 2022-09-06 RX ADMIN — HYDROMORPHONE HYDROCHLORIDE 1 MG: 1 INJECTION, SOLUTION INTRAMUSCULAR; INTRAVENOUS; SUBCUTANEOUS at 22:57

## 2022-09-06 RX ADMIN — TAZOBACTAM SODIUM AND PIPERACILLIN SODIUM 3.38 G: 375; 3 INJECTION, SOLUTION INTRAVENOUS at 22:57

## 2022-09-06 RX ADMIN — Medication 10 ML: at 08:09

## 2022-09-06 RX ADMIN — FAMOTIDINE 40 MG: 20 TABLET ORAL at 08:09

## 2022-09-06 RX ADMIN — METOPROLOL TARTRATE 50 MG: 50 TABLET, FILM COATED ORAL at 20:19

## 2022-09-06 RX ADMIN — LISINOPRIL: 20 TABLET ORAL at 08:09

## 2022-09-06 RX ADMIN — HYDROCODONE BITARTRATE AND ACETAMINOPHEN 2 TABLET: 10; 325 TABLET ORAL at 03:14

## 2022-09-06 RX ADMIN — MECLIZINE HYDROCHLORIDE 25 MG: 25 TABLET ORAL at 08:09

## 2022-09-06 RX ADMIN — HYDRALAZINE HYDROCHLORIDE 25 MG: 25 TABLET, FILM COATED ORAL at 08:09

## 2022-09-06 RX ADMIN — TAZOBACTAM SODIUM AND PIPERACILLIN SODIUM 3.38 G: 375; 3 INJECTION, SOLUTION INTRAVENOUS at 05:20

## 2022-09-06 NOTE — PLAN OF CARE
Problem: Adult Inpatient Plan of Care  Goal: Plan of Care Review  Outcome: Ongoing, Progressing  Flowsheets (Taken 9/6/2022 1433)  Progress: improving  Plan of Care Reviewed With: patient  Outcome Evaluation: Patient has been pleasant and cooperative during shift. Patient treated once for pain. No nausea or SOA. Patient assist x2 to bedside commode. Had moderate size BM. Island dressing on knee change. A lot of serosanguinous drainage on bandage. Hemovac still in place. Cardio signed off. Awaiting return to [facility]. Aox4, assist x2, room air. Will continue to monitor and assist patient as needed.  Goal: Patient-Specific Goal (Individualized)  Outcome: Ongoing, Progressing  Goal: Absence of Hospital-Acquired Illness or Injury  Outcome: Ongoing, Progressing  Intervention: Identify and Manage Fall Risk  Recent Flowsheet Documentation  Taken 9/6/2022 1400 by Mich Nolan RN  Safety Promotion/Fall Prevention:   assistive device/personal items within reach   fall prevention program maintained   nonskid shoes/slippers when out of bed   room organization consistent  Taken 9/6/2022 1200 by Mich Nolan RN  Safety Promotion/Fall Prevention:   assistive device/personal items within reach   fall prevention program maintained   nonskid shoes/slippers when out of bed   safety round/check completed  Taken 9/6/2022 0953 by Mich Nolan RN  Safety Promotion/Fall Prevention:   assistive device/personal items within reach   fall prevention program maintained   nonskid shoes/slippers when out of bed   safety round/check completed  Taken 9/6/2022 0803 by Mich Nolan RN  Safety Promotion/Fall Prevention:   assistive device/personal items within reach   fall prevention program maintained   nonskid shoes/slippers when out of bed   safety round/check completed  Intervention: Prevent Skin Injury  Recent Flowsheet Documentation  Taken 9/6/2022 1400 by Mich Nolan RN  Body Position: supine  Skin  Protection:   tubing/devices free from skin contact   incontinence pads utilized  Taken 9/6/2022 1200 by Mich Nolan RN  Body Position: supine  Taken 9/6/2022 0953 by Mich Nolan RN  Body Position: supine  Taken 9/6/2022 0803 by Mich Nolan RN  Body Position: supine  Skin Protection:   tubing/devices free from skin contact   incontinence pads utilized  Intervention: Prevent and Manage VTE (Venous Thromboembolism) Risk  Recent Flowsheet Documentation  Taken 9/6/2022 1400 by Mich Nolan RN  Activity Management: activity adjusted per tolerance  Taken 9/6/2022 1200 by Mich Nolan RN  Activity Management: activity adjusted per tolerance  Taken 9/6/2022 0953 by Mich Nolan RN  Activity Management: activity adjusted per tolerance  Taken 9/6/2022 0803 by Mich Nolan RN  Activity Management: activity adjusted per tolerance  Goal: Optimal Comfort and Wellbeing  Outcome: Ongoing, Progressing  Goal: Readiness for Transition of Care  Outcome: Ongoing, Progressing     Problem: Fall Injury Risk  Goal: Absence of Fall and Fall-Related Injury  Outcome: Ongoing, Progressing  Intervention: Promote Injury-Free Environment  Recent Flowsheet Documentation  Taken 9/6/2022 1400 by Mich Nolan RN  Safety Promotion/Fall Prevention:   assistive device/personal items within reach   fall prevention program maintained   nonskid shoes/slippers when out of bed   room organization consistent  Taken 9/6/2022 1200 by Mich Nolan RN  Safety Promotion/Fall Prevention:   assistive device/personal items within reach   fall prevention program maintained   nonskid shoes/slippers when out of bed   safety round/check completed  Taken 9/6/2022 0953 by Mich Nolan RN  Safety Promotion/Fall Prevention:   assistive device/personal items within reach   fall prevention program maintained   nonskid shoes/slippers when out of bed   safety round/check completed  Taken 9/6/2022 0803 by Mich Nolan  RN  Safety Promotion/Fall Prevention:   assistive device/personal items within reach   fall prevention program maintained   nonskid shoes/slippers when out of bed   safety round/check completed     Problem: Hypertension Comorbidity  Goal: Blood Pressure in Desired Range  Outcome: Ongoing, Progressing     Problem: Skin Injury Risk Increased  Goal: Skin Health and Integrity  Outcome: Ongoing, Progressing  Intervention: Optimize Skin Protection  Recent Flowsheet Documentation  Taken 9/6/2022 1400 by Mich Nolan RN  Pressure Reduction Techniques: frequent weight shift encouraged  Head of Bed (HOB) Positioning: HOB at 30 degrees  Pressure Reduction Devices: pressure-redistributing mattress utilized  Skin Protection:   tubing/devices free from skin contact   incontinence pads utilized  Taken 9/6/2022 1200 by Mich Nolan RN  Head of Bed (HOB) Positioning: HOB at 45 degrees  Taken 9/6/2022 0953 by Mich Nolan RN  Head of Bed (HOB) Positioning: HOB at 45 degrees  Taken 9/6/2022 0803 by Mich Nolan RN  Pressure Reduction Techniques:   frequent weight shift encouraged   weight shift assistance provided  Head of Bed (HOB) Positioning: HOB at 30-45 degrees  Pressure Reduction Devices: pressure-redistributing mattress utilized  Skin Protection:   tubing/devices free from skin contact   incontinence pads utilized   Goal Outcome Evaluation:  Plan of Care Reviewed With: patient        Progress: improving  Outcome Evaluation: Patient has been pleasant and cooperative during shift. Patient treated once for pain. No nausea or SOA. Patient assist x2 to bedside commode. Had moderate size BM. Island dressing on knee change. A lot of serosanguinous drainage on bandage. Hemovac still in place. Cardio signed off. Awaiting return to [facility]. Aox4, assist x2, room air. Will continue to monitor and assist patient as needed.

## 2022-09-06 NOTE — CASE MANAGEMENT/SOCIAL WORK
Continued Stay Note  [facility]     Patient Name: Fe Arevalo  MRN: [id number]  Today's Date: 9/6/2022    Admit Date: 8/30/2022     Discharge Plan     Row Name 09/06/22 1309       Plan    Plan Plan return to [facility] for skilled care.  KVNG Shepherd RN    Patient/Family in Agreement with Plan yes    Plan Comments Anne-Marie  ( [phone]) at [facility] called and requested chart information be faxed to her at [phone].  Information has been faxed .  Awaiting confirmation [facility] can accept pt today.  Plan [facility] for skilled care.  KVNG Shepherd RN    Row Name 09/06/22 1030       Plan    Plan Plan return to [facility] for skilled care.  KVNG Shepherd RN    Plan Comments Received phone call from pt's daughter (Priya Lynn [phone]) stating pt wants to return to [facility].  Called [facility]  ( [phone]) and left message for Anne-Marie to return call regarding bed availability at [facility].  Spoke with pt at bedside.  Pt confirms plan is to return to [facility].  Plan return to [facility] for skilled care.  KVNG Shepherd RN               Discharge Codes    No documentation.               Expected Discharge Date and Time     Expected Discharge Date Expected Discharge Time    Sep 6, 2022             Rosemarie Shepherd RN

## 2022-09-06 NOTE — CASE MANAGEMENT/SOCIAL WORK
Continued Stay Note  [facility]     Patient Name: Fe Arevalo  MRN: [id number]  Today's Date: 9/6/2022    Admit Date: 8/30/2022     Discharge Plan     Row Name 09/06/22 1036       Plan    Plan Plan return to [facility] for skilled care.  KVNG Shepherd RN    Plan Comments Received phone call from pt's daughter (Priya Lynn [phone]) stating pt wants to return to [facility].  Called [facility]  ( [phone]) and left message for Anne-Marie to return call regarding bed availability at [facility].  Spoke with pt at bedside.  Pt confirms plan is to return to [facility].  Plan return to [facility] for skilled care.  KVNG Shepherd RN               Discharge Codes    No documentation.               Expected Discharge Date and Time     Expected Discharge Date Expected Discharge Time    Sep 6, 2022             Rosemarie Shepherd, RN

## 2022-09-07 VITALS
HEART RATE: 76 BPM | TEMPERATURE: 98 F | OXYGEN SATURATION: 96 % | HEIGHT: 69 IN | DIASTOLIC BLOOD PRESSURE: 62 MMHG | BODY MASS INDEX: 32.88 KG/M2 | SYSTOLIC BLOOD PRESSURE: 157 MMHG | RESPIRATION RATE: 16 BRPM | WEIGHT: 222 LBS

## 2022-09-07 LAB
ANION GAP SERPL CALCULATED.3IONS-SCNC: 9.8 MMOL/L (ref 5–15)
BASOPHILS # BLD AUTO: 0.03 10*3/MM3 (ref 0–0.2)
BASOPHILS NFR BLD AUTO: 0.5 % (ref 0–1.5)
BUN SERPL-MCNC: 13 MG/DL (ref 8–23)
BUN/CREAT SERPL: 13.8 (ref 7–25)
CALCIUM SPEC-SCNC: 9.6 MG/DL (ref 8.6–10.5)
CHLORIDE SERPL-SCNC: 102 MMOL/L (ref 98–107)
CO2 SERPL-SCNC: 26.2 MMOL/L (ref 22–29)
CREAT SERPL-MCNC: 0.94 MG/DL (ref 0.57–1)
DEPRECATED RDW RBC AUTO: 46.5 FL (ref 37–54)
EGFRCR SERPLBLD CKD-EPI 2021: 65.4 ML/MIN/1.73
EOSINOPHIL # BLD AUTO: 0.26 10*3/MM3 (ref 0–0.4)
EOSINOPHIL NFR BLD AUTO: 4.1 % (ref 0.3–6.2)
ERYTHROCYTE [DISTWIDTH] IN BLOOD BY AUTOMATED COUNT: 14.4 % (ref 12.3–15.4)
GLUCOSE SERPL-MCNC: 105 MG/DL (ref 65–99)
HCT VFR BLD AUTO: 25.8 % (ref 34–46.6)
HGB BLD-MCNC: 8.3 G/DL (ref 12–15.9)
IMM GRANULOCYTES # BLD AUTO: 0.08 10*3/MM3 (ref 0–0.05)
IMM GRANULOCYTES NFR BLD AUTO: 1.3 % (ref 0–0.5)
LYMPHOCYTES # BLD AUTO: 0.64 10*3/MM3 (ref 0.7–3.1)
LYMPHOCYTES NFR BLD AUTO: 10 % (ref 19.6–45.3)
MCH RBC QN AUTO: 28.7 PG (ref 26.6–33)
MCHC RBC AUTO-ENTMCNC: 32.2 G/DL (ref 31.5–35.7)
MCV RBC AUTO: 89.3 FL (ref 79–97)
MONOCYTES # BLD AUTO: 0.65 10*3/MM3 (ref 0.1–0.9)
MONOCYTES NFR BLD AUTO: 10.2 % (ref 5–12)
NEUTROPHILS NFR BLD AUTO: 4.72 10*3/MM3 (ref 1.7–7)
NEUTROPHILS NFR BLD AUTO: 73.9 % (ref 42.7–76)
NRBC BLD AUTO-RTO: 0 /100 WBC (ref 0–0.2)
PLATELET # BLD AUTO: 402 10*3/MM3 (ref 140–450)
PMV BLD AUTO: 9.5 FL (ref 6–12)
POTASSIUM SERPL-SCNC: 3.5 MMOL/L (ref 3.5–5.2)
RBC # BLD AUTO: 2.89 10*6/MM3 (ref 3.77–5.28)
SODIUM SERPL-SCNC: 138 MMOL/L (ref 136–145)
WBC NRBC COR # BLD: 6.38 10*3/MM3 (ref 3.4–10.8)

## 2022-09-07 PROCEDURE — 25010000002 PIPERACILLIN SOD-TAZOBACTAM PER 1 G: Performed by: ORTHOPAEDIC SURGERY

## 2022-09-07 PROCEDURE — 80048 BASIC METABOLIC PNL TOTAL CA: CPT | Performed by: ORTHOPAEDIC SURGERY

## 2022-09-07 PROCEDURE — 97530 THERAPEUTIC ACTIVITIES: CPT

## 2022-09-07 PROCEDURE — 85025 COMPLETE CBC W/AUTO DIFF WBC: CPT | Performed by: ORTHOPAEDIC SURGERY

## 2022-09-07 RX ORDER — HYDROCODONE BITARTRATE AND ACETAMINOPHEN 10; 325 MG/1; MG/1
1 TABLET ORAL EVERY 4 HOURS PRN
Status: DISCONTINUED | OUTPATIENT
Start: 2022-09-07 | End: 2022-09-07 | Stop reason: HOSPADM

## 2022-09-07 RX ORDER — HYDROCODONE BITARTRATE AND ACETAMINOPHEN 10; 325 MG/1; MG/1
2 TABLET ORAL EVERY 4 HOURS PRN
Status: DISCONTINUED | OUTPATIENT
Start: 2022-09-07 | End: 2022-09-07 | Stop reason: HOSPADM

## 2022-09-07 RX ORDER — HYDROCODONE BITARTRATE AND ACETAMINOPHEN 10; 325 MG/1; MG/1
1 TABLET ORAL EVERY 4 HOURS PRN
Qty: 50 TABLET | Refills: 0 | Status: SHIPPED | OUTPATIENT
Start: 2022-09-07 | End: 2022-09-14

## 2022-09-07 RX ADMIN — HYDRALAZINE HYDROCHLORIDE 25 MG: 25 TABLET, FILM COATED ORAL at 08:00

## 2022-09-07 RX ADMIN — FAMOTIDINE 40 MG: 20 TABLET ORAL at 08:00

## 2022-09-07 RX ADMIN — TAZOBACTAM SODIUM AND PIPERACILLIN SODIUM 3.38 G: 375; 3 INJECTION, SOLUTION INTRAVENOUS at 05:04

## 2022-09-07 RX ADMIN — MECLIZINE HYDROCHLORIDE 25 MG: 25 TABLET ORAL at 08:00

## 2022-09-07 RX ADMIN — METOPROLOL TARTRATE 50 MG: 50 TABLET, FILM COATED ORAL at 08:00

## 2022-09-07 RX ADMIN — LISINOPRIL: 20 TABLET ORAL at 08:00

## 2022-09-07 RX ADMIN — SODIUM CHLORIDE, POTASSIUM CHLORIDE, SODIUM LACTATE AND CALCIUM CHLORIDE 9 ML/HR: 600; 310; 30; 20 INJECTION, SOLUTION INTRAVENOUS at 05:04

## 2022-09-07 RX ADMIN — HYDROCODONE BITARTRATE AND ACETAMINOPHEN 2 TABLET: 10; 325 TABLET ORAL at 08:59

## 2022-09-07 RX ADMIN — HYDROCODONE BITARTRATE AND ACETAMINOPHEN 2 TABLET: 10; 325 TABLET ORAL at 13:38

## 2022-09-07 RX ADMIN — TAZOBACTAM SODIUM AND PIPERACILLIN SODIUM 3.38 G: 375; 3 INJECTION, SOLUTION INTRAVENOUS at 10:37

## 2022-09-07 RX ADMIN — Medication 10 ML: at 08:00

## 2022-09-07 NOTE — CASE MANAGEMENT/SOCIAL WORK
Continued Stay Note  [facility]     Patient Name: Fe Arevalo  MRN: [id number]  Today's Date: 9/7/2022    Admit Date: 8/30/2022     Discharge Plan     Row Name 09/07/22 0751       Plan    Plan Plan return to [facility] for skilled care.   KVNG Shepherd RN    Patient/Family in Agreement with Plan yes    Plan Comments Awaiting confirmation from Anne-Marie  ( [phone]) that [facility] can do IV  antibiotic. Per Dr. May - Final antibiotic recommendations  1.  Zosyn 3.375 g IV every 8 hours x6 weeks.  Antibiotic stop date October 12     2.  Please monitor weekly CBC with differential and creatinine and fax the results to the infectious disease clinic at [phone]     3.  We will arrange for ID follow-up on October 7.  Information faxed to [facility].  Plan return to [facility] for skilled care.   KVNG Shepherd RN               Discharge Codes    No documentation.               Expected Discharge Date and Time     Expected Discharge Date Expected Discharge Time    Sep 6, 2022             Rosemarie Shepherd RN

## 2022-09-07 NOTE — DISCHARGE INSTR - LAB
Follow-up with her cardiologist 4 weeks after discharge. -per Dr. Franco    Final antibiotic recommendations  1.  Zosyn 3.375 g IV every 8 hours x6 weeks.  Antibiotic stop date October 12     2.  Please monitor weekly CBC with differential and creatinine and fax the results to the infectious disease clinic at [phone]     3.  We will arrange for ID follow-up on October 7- Per Dr May

## 2022-09-07 NOTE — CASE MANAGEMENT/SOCIAL WORK
Case Management Discharge Note      Final Note: Pt discharged to [facility] for skilled care.   KVNG Shepherd RN         Selected Continued Care - Admitted Since 8/30/2022     Destination Coordination complete.    Service Provider Selected Services Address Phone Fax Patient Preferred    [facility]  [facility]isted [facility] [address], [place] [address] [phone] -- --          Durable Medical Equipment    No services have been selected for the patient.              Dialysis/Infusion    No services have been selected for the patient.              Home Medical Care    No services have been selected for the patient.              Therapy    No services have been selected for the patient.              Community Resources    No services have been selected for the patient.              Community & DME    No services have been selected for the patient.                Selected Continued Care - Prior Encounters Includes selections from prior encounters from 6/1/2022 to 9/7/2022    Discharged on 8/22/2022 Admission date: 8/16/2022 - Discharge disposition: Skilled Nursing Facility (DC - External)    Destination     Service Provider Selected Services Address Phone Fax Patient Preferred    [facility]  Skilled Nursing [address], [place] [address] [phone] -- --          Home Medical Care     Service Provider Selected Services Address Phone Fax Patient Preferred    [facility] [address] [place] [address] [phone] --                Discharged on 7/16/2022 Admission date: 7/15/2022 - Discharge disposition: Home or Self Care    Home Medical Care     Service Provider Selected Services Address Phone Fax Patient Preferred    [facility] [address] [place] [address] [phone] --                    Transportation Services  Ambulance: [facility]    Final Discharge Disposition Code: 03 - skilled nursing facility (SNF)

## 2022-09-07 NOTE — PROGRESS NOTES
We continue to await rehab placement.  There are a few dusky skin edges which has been a little concerned, although there is nothing to do other than give her time to fully heal.  Hemoglobin does continue to trend down.  Continue to check.  I appreciate medical assistance with this complicated patient.

## 2022-09-07 NOTE — DISCHARGE SUMMARY
Orthopaedic Discharge Summary  Dr. SHIRA Harris” [facility]  [phone]    NAME: Fe LAUREANO Irina PCP: Dion Curry MD   :  MRN: [id number]  [id number] LOS:  ADMIT: 8 days  2022   AGE/SEX: 72 y.o. female DC:  today             · Admitting Diagnosis: Infected prosthetic knee joint (HCC) [T84.59XA, Z96.659]    · Surgery Performed: LEFT KNEE IMPLANT ANTIBIOTIC SPACER    · Discharge Medications:         Discharge Medications      Changes to Medications      Instructions Start Date   HYDROcodone-acetaminophen  MG per tablet  Commonly known as: NORCO  What changed: when to take this   1 tablet, Oral, Every 4 Hours PRN         Continue These Medications      Instructions Start Date   Chlorhexidine Gluconate 2 % pads   Apply externally, AS DIRECTED PAT      estradiol 1 MG tablet  Commonly known as: ESTRACE   1 mg, Oral, Daily      hydrALAZINE 25 MG tablet  Commonly known as: APRESOLINE   25 mg, Oral, 2 Times Daily      lisinopril-hydrochlorothiazide 20-12.5 MG per tablet  Commonly known as: PRINZIDE,ZESTORETIC   1 tablet, Oral, Daily      meclizine 25 MG tablet  Commonly known as: ANTIVERT   25 mg, Oral, Daily, MAY TAKE DURING THE DAY IF NEEDED      metoprolol tartrate 50 MG tablet  Commonly known as: LOPRESSOR   50 mg, Oral, 2 Times Daily      multivitamin with minerals tablet tablet   1 tablet, Oral, Daily, HOLDING FOR SURGERY      mupirocin 2 % ointment  Commonly known as: BACTROBAN   1 application, Topical, 3 Times Daily, AS DIRECTED PAT      NON FORMULARY   1 dose, Nightly, TOPICAL CREAM-USES AT NIGHT FOR RIGHT KNEE PAIN, STATES THIS ESSENTIAL OILS HOLD FOR SURGERY      piperacillin-tazobactam 3-0.375 GM/50ML IVPB  Commonly known as: ZOSYN   3.375 g, Intravenous, Every 8 Hours      Vitamin D3 50 MCG ( UT) capsule   2,000 Units, Oral, Daily      warfarin 5 MG tablet  Commonly known as: COUMADIN   5 mg, Oral, Daily Warfarin, WEDNESDAY AND       warfarin 5 MG tablet  Commonly known as:  "COUMADIN   7.5 mg, Oral, Daily Warfarin, ALL DAYS EXCEPT WEDNESDAY AND SUNDAY             · Vitals:     Vitals:    09/06/22 1245 09/06/22 1947 09/06/22 2345 09/07/22 0718   BP: 129/58 147/74 143/75 157/62   BP Location: Left arm Left arm Left arm Right arm   Patient Position: Lying Lying Lying Lying   Pulse: 79 97 68 76   Resp: 18 18 16 16   Temp: 97.7 °F (36.5 °C) 97.9 °F (36.6 °C) 98 °F (36.7 °C) 98 °F (36.7 °C)   TempSrc: Oral Oral Oral Oral   SpO2:  99% 95% 96%   Weight:       Height:           · Labs:      No results displayed because visit has over 200 results.           No results found.    · Hospital Course:   72 y.o. female was admitted to [facility] to services of Nacho Hammonds II, MD with Infected prosthetic knee joint (HCC) [T84.59XA, Z96.659] on 8/30/2022 and underwent LEFT KNEE IMPLANT ANTIBIOTIC SPACER. Post-operatively the patient transferred to the floor where the patient underwent mobilization therapy. Opioids were titrated to achieve appropriate pain management to allow for participation in mobilization exercises. Vital signs and laboratory values are now within safe parameters for discharge. The dressings and/or incision is intact without signs or symptoms of active infection. Operative extremity neurovascular status remains intact as compared to the preoperative exam. Appropriate education re: incision care, activity levels, medications, and follow up visits was completed and all questions were answered. The patient is now deemed stable for discharge.    SNF: The patient had a difficult time mobilizing sufficiently with physical therapy. Further rehabilitation was recommended in a SNF facility. Once a bed was available, and the patient was cleared, there were discharged to the SNF in good condition}.       R \"Stevan\" Cassius DAVIES MD  Orthopaedic Surgery  [facility]  [phone]                                               "

## 2022-09-07 NOTE — PLAN OF CARE
Goal Outcome Evaluation:  Plan of Care Reviewed With: patient           Outcome Evaluation: Upon entering room, pt. supine in bed, awake/alert, and agreeable to work with P.T. this date. Pt. able to ambulate 2 feet, Min. assist x 2, with use of Rwx this AM.  Pt. requires Min. assist x 2 for bed mobility and Min. assist x 2 for sit <-> stand transfers.  LLE ther. ex. program x 10 reps completed for general strengthening.  Pt. able to maintain NWB RLE status throughout upright mobility.  Verbal/tactile cues given throughout for posture correction. Will continue to progress functional mobility as tolerated.    Patient was wearing a face mask during this therapy encounter. Therapist used appropriate personal protective equipment including eye protection, mask, and gloves.  Mask used was standard procedure mask. Appropriate PPE was worn during the entire therapy session. Hand hygiene was completed before and after therapy session. Patient is not in enhanced droplet precautions.

## 2022-09-07 NOTE — CASE MANAGEMENT/SOCIAL WORK
"Physicians Statement of Medical Necessity for  Ambulance Transportation    GENERAL INFORMATION     Name: Fe Arevalo  YOB: 1950  Medicare #: [id number]  Transport Date:  9/7/2022  (Valid for round trips this date, or for scheduled repetitive trips for 60 days from the date signed below.)  Origin: [facility]             Destination: [facility]  Is the Patient's stay covered under Medicare Part A (PPS/DRG?)Yes  Closest appropriate facility? Yes  If this a hosp-hosp transfer? No  Is this a hospice patient? No    MEDICAL NECESSITY QUESTIONAIRE    Ambulance Transportation is medically necessary only if other means of transportation are contraindicated or would be potentially harmful to the patient.  To meet this requirement, the patient must be either \"bed confined\" or suffer from a condition such that transport by means other than an ambulance is contraindicated by the patient's condition.  The following questions must be answered by the healthcare professional signing below for this form to be valid:     1) Describe the MEDICAL CONDITION (physical and/or mental) of this patient AT THE TIME OF AMBULANCE TRANSPORT that requires the patient to be transported in an ambulance, and why transport by other means is contraindicated by the patient's condition: UNABLE TO TOLERATE SEATED POSITION FOR TRANSFER- INFECTED PROSTHETIC OF KNEE  Past Medical History:   Diagnosis Date   • A-fib (HCC)    • Histoplasmosis     STATES BILAT EYES:  INJECTIONS INTO HER EYES BILAT EVERY 7 WEEKS.  DENIES VISION LOSS   • History of blood clots 08/2021    PT STATES WAS DIAGNOSED WITH BLOOD CLOT BEHIND HER HEART AUG 2021-PT STATES THIS IS RESOLVED AND HAD CARDIAC ABLATION APRIL 2022   • Hypertension    • Lab test positive for detection of COVID-19 virus 02/2022   • Osteoarthritis of right knee    • Osteomyelitis (HCC) 2006    MRSA RIGHT FOOT   • PONV (postoperative nausea and vomiting)    • Right knee " "pain     WEAKNESS AND LIMITED MOBILITY      Past Surgical History:   Procedure Laterality Date   • CARDIAC ABLATION  04/29/2022    X2.  PT HAS ATRICLIP DANISHA EXCLUSION SYSTEM IN PLACE THAT PT STATES IS NOT WORKING   • CHOLECYSTECTOMY     • COLONOSCOPY     • FOOT SURGERY Right     x7 DT OSTEOMYELITIS   • HYSTERECTOMY  2004   • KNEE MINI REVISION Right 8/18/2022    Procedure: KNEE POLY INSERT EXCHANGE;  Surgeon: Nacho Hammonds II, MD;  Location: [facility] OR;  Service: Orthopedics;  Laterality: Right;   • KNEE MINI REVISION Right 8/31/2022    Procedure: RIGHT KNEE IMPLANT ANTIBIOTIC SPACER;  Surgeon: Nacho Hammonds II, MD;  Location: [facility] OR;  Service: Orthopedics;  Laterality: Right;   • TOTAL KNEE ARTHROPLASTY Right 7/15/2022    Procedure: TOTAL KNEE ARTHROPLASTY WITH ROBOT;  Surgeon: Nacho Hammonds II, MD;  Location: [facility] OR [facility];  Service: Robotics - Ortho;  Laterality: Right;   •  SHUNT INSERTION        2) Is this patient \"bed confined\" as defined below?No    To be \"bed confined\" the patient must satisfy all three of the following criteria:  (1) unable to get up from bed without assistance; AND (2) unable to ambulate;  AND (3) unable to sit in a chair or wheelchair.  3) Can this patient safely be transported by car or wheelchair van (I.e., may safely sit during transport, without an attendant or monitoring?)No   4. In addition to completing questions 1-3 above, please check any of the following conditions that apply*:          *Note: supporting documentation for any boxes checked must be maintained in the patient's medical records Unable to tolerate seated position for time needed to transport      SIGNATURE OF PHYSICIAN OR OTHER AUTHORIZED HEALTHCARE PROFESSIONAL    I certify that the above information is true and correct based on my evaluation of this patient, and represent that the patient requires transport by ambulance and that other forms of transport are " contraindicated.  I understand that this information will be used by the Centers for Medicare and Medicaid Services (CMS) to support the determiniation of medical necessity for ambulance services, and I represent that I have personal knowledge of the patient's condition at the time of transport.       If this box is checked, I also certify that the patient is physically or mentally incapable of signing the ambulance service's claim form and that the institution with which I am affiliated has furnished care, services or assistance to the patient.  My signature below is made on behalf of the patient pursuant to 42 .36(b)(4). In accordance with 42 .37, the specific reason(s) that the patient is physically or mentally incapable of signing the claim for is as follows:     Signature of Physician or Healthcare Professional  Date/Time:   9/7/2022      (For Scheduled repetitive transport, this form is not valid for transports performed more than 60 days after this date).      NING RUBIO RN                                                                                                                                      --------------------------------------------------------------------------------------------  Printed Name and Credentials of Physician or Authorized Healthcare Professional     *Form must be signed by patient's attending physician for scheduled, repetitive transports,.  For non-repetitive ambulance transports, if unable to obtain the signature of the attending physician, any of the following may sign (please select below):     Physician  Clinical Nurse Specialist X Registered Nurse     Physician Assistant XX Discharge Planner  Licensed Practical Nurse     Nurse Practitioner XX

## 2022-09-07 NOTE — THERAPY TREATMENT NOTE
Patient Name: Fe Arevalo  : 1950    MRN: [id number]                              Today's Date: 2022       Admit Date: 2022    Visit Dx:     ICD-10-CM ICD-9-CM   1. Infection of total knee replacement (HCC)  T84.59XA 996.66    Z96.659 V43.65     Patient Active Problem List   Diagnosis   • Arthritis of ankle   • Wound dehiscence   • Pain and swelling of ankle   • Status post knee replacement   • Status post knee replacement, unspecified laterality   • Infected prosthetic knee joint (HCC)   • Chronic a-fib (HCC)   • Hypertension   • CKD (chronic kidney disease) stage 3, GFR 30-59 ml/min (HCC)   • Chronic anticoagulation   • Abnormal EKG   • Supratherapeutic INR   • Allergic reaction caused by a drug     Past Medical History:   Diagnosis Date   • A-fib (HCC)    • Histoplasmosis     STATES BILAT EYES:  INJECTIONS INTO HER EYES BILAT EVERY 7 WEEKS.  DENIES VISION LOSS   • History of blood clots 2021    PT STATES WAS DIAGNOSED WITH BLOOD CLOT BEHIND HER HEART AUG 2021-PT STATES THIS IS RESOLVED AND HAD CARDIAC ABLATION 2022   • Hypertension    • Lab test positive for detection of COVID-19 virus 2022   • Osteoarthritis of right knee    • Osteomyelitis (HCC)     MRSA RIGHT FOOT   • PONV (postoperative nausea and vomiting)    • Right knee pain     WEAKNESS AND LIMITED MOBILITY     Past Surgical History:   Procedure Laterality Date   • CARDIAC ABLATION  04/29/2022    X2.  PT HAS ATRICLIP DANISHA EXCLUSION SYSTEM IN PLACE THAT PT STATES IS NOT WORKING   • CHOLECYSTECTOMY     • COLONOSCOPY     • FOOT SURGERY Right     x7 DT OSTEOMYELITIS   • HYSTERECTOMY     • KNEE MINI REVISION Right 2022    Procedure: KNEE POLY INSERT EXCHANGE;  Surgeon: Nacho Hammonds II, MD;  Location: [facility] MAIN OR;  Service: Orthopedics;  Laterality: Right;   • KNEE MINI REVISION Right 2022    Procedure: RIGHT KNEE IMPLANT ANTIBIOTIC SPACER;  Surgeon: Nacho Hammonds II, MD;  Location:   [facility] OR;  Service: Orthopedics;  Laterality: Right;   • TOTAL KNEE ARTHROPLASTY Right 7/15/2022    Procedure: TOTAL KNEE ARTHROPLASTY WITH ROBOT;  Surgeon: Nacho Hammonds II, MD;  Location:  [facility] OR [facility];  Service: Robotics - Ortho;  Laterality: Right;   •  SHUNT INSERTION        General Information     Row Name 09/07/22 1119          Physical Therapy Time and Intention    Document Type therapy note (daily note)  -MS     Mode of Treatment physical therapy;individual therapy  -MS     Row Name 09/07/22 1119          General Information    Patient Profile Reviewed yes  -MS     Existing Precautions/Restrictions fall   K.I. donned RLE at all times;  NWB RLE; Exit alarm  -MS     Barriers to Rehab none identified  -MS     Row Name 09/07/22 1119          Cognition    Orientation Status (Cognition) oriented x 3  -MS     Row Name 09/07/22 1119          Safety Issues, Functional Mobility    Comment, Safety Issues/Impairments (Mobility) Gait belt used for safety.  -MS           User Key  (r) = Recorded By, (t) = Taken By, (c) = Cosigned By    Initials Name Provider Type    MS Walker Dejesus, PT Physical Therapist               Mobility     Row Name 09/07/22 1120          Bed Mobility    Supine-Sit [place] (Bed Mobility) minimum assist (75% patient effort);2 person assist  -MS     Sit-Supine [place] (Bed Mobility) minimum assist (75% patient effort);2 person assist  -MS     Row Name 09/07/22 1120          Sit-Stand Transfer    Sit-Stand [place] (Transfers) minimum assist (75% patient effort);2 person assist  -MS     Assistive Device (Sit-Stand Transfers) walker, front-wheeled  -MS     Row Name 09/07/22 1120          Gait/Stairs (Locomotion)    [place] Level (Gait) minimum assist (75% patient effort);2 person assist  -MS     Assistive Device (Gait) walker, front-wheeled  -MS     Distance in Feet (Gait) 2 feet  -MS     Deviations/Abnormal Patterns (Gait) monica decreased  -MS     Bilateral  Gait Deviations forward flexed posture  -MS     Comment, (Gait/Stairs) Assisted pt. on/off of Commode for bowel movement.  Pt. able to maintain NWB RLE throughout upright mobility.  -MS     Row Name 09/07/22 1120          Mobility    Right Lower Extremity (Weight-bearing Status) non weight-bearing (NWB)  Per MD notes  -MS           User Key  (r) = Recorded By, (t) = Taken By, (c) = Cosigned By    Initials Name Provider Type    Walker Bar PT Physical Therapist               Obj/Interventions     Row Name 09/07/22 1121          Motor Skills    Therapeutic Exercise --  LLE ther. ex. program x 10 reps completed (Ankle pumps, Hip Flexion, LAQ's)  -MS           User Key  (r) = Recorded By, (t) = Taken By, (c) = Cosigned By    Initials Name Provider Type    Walker Bar, PT Physical Therapist               Goals/Plan    No documentation.                Clinical Impression     Row Name 09/07/22 1121          Pain    Pretreatment Pain Rating 5/10  -MS     Posttreatment Pain Rating 5/10  -MS     Pain Location - Side/Orientation Right  -MS     Pain Location - knee  -MS     Pain Intervention(s) Medication (See MAR);Elevated;Nursing Notified;Repositioned;Cold applied  -MS     Row Name 09/07/22 1121          Positioning and Restraints    Pre-Treatment Position in bed  -MS     Post Treatment Position bed  -MS     In Bed notified nsg;supine;call light within reach;encouraged to call for assist;exit alarm on;with family/caregiver  All lines intact.  -MS           User Key  (r) = Recorded By, (t) = Taken By, (c) = Cosigned By    Initials Name Provider Type    Walker Bar, PT Physical Therapist               Outcome Measures     Row Name 09/07/22 1122 09/07/22 0748       How much help from another person do you currently need...    Turning from your back to your side while in flat bed without using bedrails? 2  -MS 3  -MSA    Moving from lying on back to sitting on the side of a flat bed without bedrails? 2   -MS 3  -MSA    Moving to and from a bed to a chair (including a wheelchair)? 2  -MS 3  -MSA    Standing up from a chair using your arms (e.g., wheelchair, bedside chair)? 2  -MS 2  -MSA    Climbing 3-5 steps with a railing? 2  -MS 1  -MSA    To walk in hospital room? 2  -MS 2  -MSA    AM-PAC 6 Clicks Score (PT) 12  -MS 14  -MSA    Highest level of mobility 4 --> Transferred to chair/commode  -MS 4 --> Transferred to chair/commode  -MSA    Row Name 09/07/22 1122          Functional Assessment    Outcome Measure Options AM-PAC 6 Clicks Basic Mobility (PT)  -MS           User Key  (r) = Recorded By, (t) = Taken By, (c) = Cosigned By    Initials Name Provider Type    Mich Arnold, RN Registered Nurse    Walker Bar, PT Physical Therapist                             Physical Therapy Education                 Title: PT OT SLP Therapies (Done)     Topic: Physical Therapy (Done)     Point: Mobility training (Done)     Learning Progress Summary           Patient Acceptance, E,D, VU,NR by MS at 9/7/2022 1122    Acceptance, E,TB,D, VU,DU,NR by  at 9/5/2022 1403    Acceptance, E,TB, VU by JS at 9/5/2022 0917    Acceptance, TB,E, VU by PM at 9/4/2022 1729    Acceptance, E, VU,NR by CN at 9/4/2022 1319    Acceptance, E,TB,D, VU,NR by EJ at 9/2/2022 1225                   Point: Home exercise program (Done)     Learning Progress Summary           Patient Acceptance, E,D, VU,NR by MS at 9/7/2022 1122    Acceptance, E,TB, VU by JS at 9/5/2022 0917    Acceptance, TB,E, VU by PM at 9/4/2022 1729                   Point: Body mechanics (Done)     Learning Progress Summary           Patient Acceptance, E,D, VU,NR by MS at 9/7/2022 1122    Acceptance, E,TB, VU by JS at 9/5/2022 0917    Acceptance, TB,E, VU by PM at 9/4/2022 1729                   Point: Precautions (Done)     Learning Progress Summary           Patient Acceptance, E,D, VU,NR by MS at 9/7/2022 1122    Acceptance, JUAN RAMON,SHARLENE WILLIAMSON VU, DU,NR by  at 9/5/2022 1403     Acceptance, E,TB, VU by JS at 9/5/2022 0917    Acceptance, TB,E, VU by PM at 9/4/2022 1729                               User Key     Initials Effective Dates Name Provider Type Discipline    MS 06/16/21 -  Walker Dejesus, PT Physical Therapist PT    EJ 06/16/21 -  Palmira Torres, PT Physical Therapist PT    CN 06/16/21 -  Grace Lewis, PT Physical Therapist PT    JS 06/16/21 -  Suhail Coy, RN Registered Nurse Nurse    PM 06/07/21 -  Dashawn Alaniz, RN Registered Nurse Nurse     05/26/20 -  Norma Junior, PT Physical Therapist PT              PT Recommendation and Plan     Plan of Care Reviewed With: patient  Outcome Evaluation: Upon entering room, pt. supine in bed, awake/alert, and agreeable to work with P.T. this date. Pt. able to ambulate 2 feet, Min. assist x 2, with use of Rwx this AM.  Pt. requires Min. assist x 2 for bed mobility and Min. assist x 2 for sit <-> stand transfers.  LLE ther. ex. program x 10 reps completed for general strengthening.  Pt. able to maintain NWB RLE status throughout upright mobility.  Verbal/tactile cues given throughout for posture correction. Will continue to progress functional mobility as tolerated.     Time Calculation:    PT Charges     Row Name 09/07/22 1125             Time Calculation    Start Time 0825  -MS      Stop Time 0855  -MS      Time Calculation (min) 30 min  -MS      PT Received On 09/07/22  -MS      PT - Next Appointment 09/08/22  -MS              Time Calculation- PT    Total Timed Code Minutes- PT 25 minute(s)  -MS            User Key  (r) = Recorded By, (t) = Taken By, (c) = Cosigned By    Initials Name Provider Type    MS DejesusWalker, PT Physical Therapist              Therapy Charges for Today     Code Description Service Date Service Provider Modifiers Qty    45585405386 HC PT THERAPEUTIC ACT EA 15 MIN 9/7/2022 Walker Dejesus, PT GP 2    63529882690 HC PT THER SUPP EA 15 MIN 9/7/2022 Walker Dejesus, PT GP 2           PT G-Codes  Outcome Measure Options: AM-PAC 6 Clicks Basic Mobility (PT)  AM-PAC 6 Clicks Score (PT): 12    Walker Dejesus, PT  9/7/2022

## 2022-09-07 NOTE — DISCHARGE INSTRUCTIONS
toe-touch weightbearing right lower extremity     drain to remain in place until no longer having any output     wound care consult to knee     daily dry dressing changes to knee incision

## 2022-09-07 NOTE — CASE MANAGEMENT/SOCIAL WORK
Continued Stay Note  [facility]     Patient Name: Fe Arevalo  MRN: [id number]  Today's Date: 9/7/2022    Admit Date: 8/30/2022     Discharge Plan     Row Name 09/07/22 1057       Plan    Plan Plan return to [facility] for skilled care.   KVNG Shepherd RN    Patient/Family in Agreement with Plan yes    Plan Comments Per Cece  ( [phone]) from [facility] they can accept pt today.  [facility] to send two doses of IV Zosyn with pt.  Discharge Summary faxed to [facility].  Discharge Summary in packet.  Prescriptions to be sent to facility pharmacy.  Packet to RN.  [facility] arranged to transport pt to [facility] at 1330.  Called pt's daughter (Priya Lynn [phone]) and notified her of pt's discharge plan and time.  Plan return to [facility] for skilled care.   KVNG Shepherd RN               Discharge Codes    No documentation.               Expected Discharge Date and Time     Expected Discharge Date Expected Discharge Time    Sep 7, 2022             Rosemarie Shepherd, RN

## 2022-09-16 ENCOUNTER — HOSPITAL ENCOUNTER (INPATIENT)
Facility: HOSPITAL | Age: 72
LOS: 6 days | Discharge: ANOTHER HOSPITAL | End: 2022-09-23
Attending: ORTHOPAEDIC SURGERY | Admitting: ORTHOPAEDIC SURGERY

## 2022-09-16 PROBLEM — E66.9 OBESITY (BMI 30-39.9): Status: ACTIVE | Noted: 2022-09-16

## 2022-09-16 LAB
BASOPHILS # BLD AUTO: 0.03 10*3/MM3 (ref 0–0.2)
BASOPHILS NFR BLD AUTO: 0.3 % (ref 0–1.5)
CRP SERPL-MCNC: 1.7 MG/DL (ref 0–0.5)
DEPRECATED RDW RBC AUTO: 46.8 FL (ref 37–54)
EOSINOPHIL # BLD AUTO: 0.06 10*3/MM3 (ref 0–0.4)
EOSINOPHIL NFR BLD AUTO: 0.6 % (ref 0.3–6.2)
ERYTHROCYTE [DISTWIDTH] IN BLOOD BY AUTOMATED COUNT: 14.4 % (ref 12.3–15.4)
ERYTHROCYTE [SEDIMENTATION RATE] IN BLOOD: 71 MM/HR (ref 0–30)
HCT VFR BLD AUTO: 29.7 % (ref 34–46.6)
HGB BLD-MCNC: 9.7 G/DL (ref 12–15.9)
IMM GRANULOCYTES # BLD AUTO: 0.06 10*3/MM3 (ref 0–0.05)
IMM GRANULOCYTES NFR BLD AUTO: 0.6 % (ref 0–0.5)
INR PPP: 1.3 (ref 0.9–1.1)
LYMPHOCYTES # BLD AUTO: 0.89 10*3/MM3 (ref 0.7–3.1)
LYMPHOCYTES NFR BLD AUTO: 9.3 % (ref 19.6–45.3)
MCH RBC QN AUTO: 28.6 PG (ref 26.6–33)
MCHC RBC AUTO-ENTMCNC: 32.7 G/DL (ref 31.5–35.7)
MCV RBC AUTO: 87.6 FL (ref 79–97)
MONOCYTES # BLD AUTO: 0.67 10*3/MM3 (ref 0.1–0.9)
MONOCYTES NFR BLD AUTO: 7 % (ref 5–12)
NEUTROPHILS NFR BLD AUTO: 7.84 10*3/MM3 (ref 1.7–7)
NEUTROPHILS NFR BLD AUTO: 82.2 % (ref 42.7–76)
NRBC BLD AUTO-RTO: 0 /100 WBC (ref 0–0.2)
PLATELET # BLD AUTO: 421 10*3/MM3 (ref 140–450)
PMV BLD AUTO: 10.4 FL (ref 6–12)
PROTHROMBIN TIME: 16 SECONDS (ref 11.7–14.2)
RBC # BLD AUTO: 3.39 10*6/MM3 (ref 3.77–5.28)
WBC NRBC COR # BLD: 9.55 10*3/MM3 (ref 3.4–10.8)

## 2022-09-16 PROCEDURE — 25010000002 PIPERACILLIN SOD-TAZOBACTAM PER 1 G: Performed by: PHYSICIAN ASSISTANT

## 2022-09-16 PROCEDURE — 85025 COMPLETE CBC W/AUTO DIFF WBC: CPT | Performed by: PHYSICIAN ASSISTANT

## 2022-09-16 PROCEDURE — 85652 RBC SED RATE AUTOMATED: CPT | Performed by: PHYSICIAN ASSISTANT

## 2022-09-16 PROCEDURE — 99214 OFFICE O/P EST MOD 30 MIN: CPT | Performed by: INTERNAL MEDICINE

## 2022-09-16 PROCEDURE — 93005 ELECTROCARDIOGRAM TRACING: CPT | Performed by: PHYSICIAN ASSISTANT

## 2022-09-16 PROCEDURE — 93010 ELECTROCARDIOGRAM REPORT: CPT | Performed by: INTERNAL MEDICINE

## 2022-09-16 PROCEDURE — 25010000002 HYDROMORPHONE 1 MG/ML SOLUTION: Performed by: PHYSICIAN ASSISTANT

## 2022-09-16 PROCEDURE — 87102 FUNGUS ISOLATION CULTURE: CPT | Performed by: ORTHOPAEDIC SURGERY

## 2022-09-16 PROCEDURE — 86140 C-REACTIVE PROTEIN: CPT | Performed by: PHYSICIAN ASSISTANT

## 2022-09-16 PROCEDURE — G0378 HOSPITAL OBSERVATION PER HR: HCPCS

## 2022-09-16 PROCEDURE — 85610 PROTHROMBIN TIME: CPT | Performed by: HOSPITALIST

## 2022-09-16 RX ORDER — MULTIPLE VITAMINS W/ MINERALS TAB 9MG-400MCG
1 TAB ORAL DAILY
Status: DISCONTINUED | OUTPATIENT
Start: 2022-09-16 | End: 2022-09-24 | Stop reason: HOSPADM

## 2022-09-16 RX ORDER — HYDRALAZINE HYDROCHLORIDE 25 MG/1
25 TABLET, FILM COATED ORAL 2 TIMES DAILY
Status: DISCONTINUED | OUTPATIENT
Start: 2022-09-16 | End: 2022-09-24 | Stop reason: HOSPADM

## 2022-09-16 RX ORDER — HYDROCODONE BITARTRATE AND ACETAMINOPHEN 10; 325 MG/1; MG/1
2 TABLET ORAL EVERY 4 HOURS PRN
Status: DISPENSED | OUTPATIENT
Start: 2022-09-16 | End: 2022-09-23

## 2022-09-16 RX ORDER — HYDROCODONE BITARTRATE AND ACETAMINOPHEN 10; 325 MG/1; MG/1
1 TABLET ORAL EVERY 4 HOURS PRN
Status: DISPENSED | OUTPATIENT
Start: 2022-09-16 | End: 2022-09-23

## 2022-09-16 RX ORDER — MELATONIN
2000 ONCE
Status: COMPLETED | OUTPATIENT
Start: 2022-09-16 | End: 2022-09-16

## 2022-09-16 RX ORDER — ACETAMINOPHEN 160 MG
2000 TABLET,DISINTEGRATING ORAL DAILY
Status: DISCONTINUED | OUTPATIENT
Start: 2022-09-16 | End: 2022-09-16 | Stop reason: CLARIF

## 2022-09-16 RX ORDER — METOPROLOL TARTRATE 50 MG/1
50 TABLET, FILM COATED ORAL 2 TIMES DAILY
Status: DISCONTINUED | OUTPATIENT
Start: 2022-09-16 | End: 2022-09-24 | Stop reason: HOSPADM

## 2022-09-16 RX ORDER — MELATONIN
2000 DAILY
Status: DISCONTINUED | OUTPATIENT
Start: 2022-09-17 | End: 2022-09-24 | Stop reason: HOSPADM

## 2022-09-16 RX ORDER — LISINOPRIL AND HYDROCHLOROTHIAZIDE 12.5; 1 MG/1; MG/1
1 TABLET ORAL DAILY
COMMUNITY

## 2022-09-16 RX ADMIN — METOPROLOL TARTRATE 50 MG: 50 TABLET, FILM COATED ORAL at 21:34

## 2022-09-16 RX ADMIN — HYDROCODONE BITARTRATE AND ACETAMINOPHEN 2 TABLET: 10; 325 TABLET ORAL at 17:59

## 2022-09-16 RX ADMIN — HYDRALAZINE HYDROCHLORIDE 25 MG: 25 TABLET, FILM COATED ORAL at 21:34

## 2022-09-16 RX ADMIN — MULTIPLE VITAMINS W/ MINERALS TAB 1 TABLET: TAB at 18:00

## 2022-09-16 RX ADMIN — HYDROMORPHONE HYDROCHLORIDE 1 MG: 1 INJECTION, SOLUTION INTRAMUSCULAR; INTRAVENOUS; SUBCUTANEOUS at 11:48

## 2022-09-16 RX ADMIN — HYDROCODONE BITARTRATE AND ACETAMINOPHEN 2 TABLET: 10; 325 TABLET ORAL at 21:33

## 2022-09-16 RX ADMIN — HYDROCODONE BITARTRATE AND ACETAMINOPHEN 1 TABLET: 10; 325 TABLET ORAL at 13:12

## 2022-09-16 RX ADMIN — TAZOBACTAM SODIUM AND PIPERACILLIN SODIUM 3.38 G: 375; 3 INJECTION, SOLUTION INTRAVENOUS at 18:00

## 2022-09-16 RX ADMIN — Medication 2000 UNITS: at 21:35

## 2022-09-17 PROBLEM — T81.49XA WOUND INFECTION AFTER SURGERY: Status: ACTIVE | Noted: 2022-09-17

## 2022-09-17 LAB
APTT PPP: 31.7 SECONDS (ref 22.7–35.4)
APTT PPP: 56.9 SECONDS (ref 22.7–35.4)
BASOPHILS # BLD AUTO: 0.04 10*3/MM3 (ref 0–0.2)
BASOPHILS NFR BLD AUTO: 0.6 % (ref 0–1.5)
DEPRECATED RDW RBC AUTO: 50 FL (ref 37–54)
EOSINOPHIL # BLD AUTO: 0.27 10*3/MM3 (ref 0–0.4)
EOSINOPHIL NFR BLD AUTO: 4 % (ref 0.3–6.2)
ERYTHROCYTE [DISTWIDTH] IN BLOOD BY AUTOMATED COUNT: 14.8 % (ref 12.3–15.4)
HCT VFR BLD AUTO: 28.4 % (ref 34–46.6)
HGB BLD-MCNC: 8.8 G/DL (ref 12–15.9)
IMM GRANULOCYTES # BLD AUTO: 0.03 10*3/MM3 (ref 0–0.05)
IMM GRANULOCYTES NFR BLD AUTO: 0.4 % (ref 0–0.5)
INR PPP: 1.28 (ref 0.9–1.1)
LYMPHOCYTES # BLD AUTO: 1.04 10*3/MM3 (ref 0.7–3.1)
LYMPHOCYTES NFR BLD AUTO: 15.6 % (ref 19.6–45.3)
MCH RBC QN AUTO: 28.8 PG (ref 26.6–33)
MCHC RBC AUTO-ENTMCNC: 31 G/DL (ref 31.5–35.7)
MCV RBC AUTO: 92.8 FL (ref 79–97)
MONOCYTES # BLD AUTO: 0.67 10*3/MM3 (ref 0.1–0.9)
MONOCYTES NFR BLD AUTO: 10 % (ref 5–12)
NEUTROPHILS NFR BLD AUTO: 4.63 10*3/MM3 (ref 1.7–7)
NEUTROPHILS NFR BLD AUTO: 69.4 % (ref 42.7–76)
NRBC BLD AUTO-RTO: 0 /100 WBC (ref 0–0.2)
PLATELET # BLD AUTO: 402 10*3/MM3 (ref 140–450)
PMV BLD AUTO: 10 FL (ref 6–12)
PROTHROMBIN TIME: 15.8 SECONDS (ref 11.7–14.2)
QT INTERVAL: 352 MS
RBC # BLD AUTO: 3.06 10*6/MM3 (ref 3.77–5.28)
WBC NRBC COR # BLD: 6.68 10*3/MM3 (ref 3.4–10.8)

## 2022-09-17 PROCEDURE — 85730 THROMBOPLASTIN TIME PARTIAL: CPT | Performed by: INTERNAL MEDICINE

## 2022-09-17 PROCEDURE — 85610 PROTHROMBIN TIME: CPT | Performed by: HOSPITALIST

## 2022-09-17 PROCEDURE — 25010000002 HEPARIN (PORCINE) 25000-0.45 UT/250ML-% SOLUTION: Performed by: INTERNAL MEDICINE

## 2022-09-17 PROCEDURE — 85025 COMPLETE CBC W/AUTO DIFF WBC: CPT | Performed by: INTERNAL MEDICINE

## 2022-09-17 PROCEDURE — 25010000002 PIPERACILLIN SOD-TAZOBACTAM PER 1 G: Performed by: PHYSICIAN ASSISTANT

## 2022-09-17 PROCEDURE — 99222 1ST HOSP IP/OBS MODERATE 55: CPT | Performed by: INTERNAL MEDICINE

## 2022-09-17 PROCEDURE — 25010000002 HEPARIN (PORCINE) PER 1000 UNITS: Performed by: INTERNAL MEDICINE

## 2022-09-17 RX ORDER — HEPARIN SODIUM 10000 [USP'U]/100ML
14.6 INJECTION, SOLUTION INTRAVENOUS
Status: DISCONTINUED | OUTPATIENT
Start: 2022-09-17 | End: 2022-09-24 | Stop reason: HOSPADM

## 2022-09-17 RX ORDER — HEPARIN SODIUM 5000 [USP'U]/ML
40-80 INJECTION, SOLUTION INTRAVENOUS; SUBCUTANEOUS EVERY 6 HOURS PRN
Status: DISCONTINUED | OUTPATIENT
Start: 2022-09-17 | End: 2022-09-24 | Stop reason: HOSPADM

## 2022-09-17 RX ADMIN — HYDRALAZINE HYDROCHLORIDE 25 MG: 25 TABLET, FILM COATED ORAL at 08:38

## 2022-09-17 RX ADMIN — HEPARIN SODIUM 8200 UNITS: 5000 INJECTION, SOLUTION INTRAVENOUS; SUBCUTANEOUS at 23:56

## 2022-09-17 RX ADMIN — Medication 2000 UNITS: at 08:38

## 2022-09-17 RX ADMIN — METOPROLOL TARTRATE 50 MG: 50 TABLET, FILM COATED ORAL at 20:53

## 2022-09-17 RX ADMIN — LISINOPRIL: 10 TABLET ORAL at 08:38

## 2022-09-17 RX ADMIN — HYDROCODONE BITARTRATE AND ACETAMINOPHEN 2 TABLET: 10; 325 TABLET ORAL at 20:53

## 2022-09-17 RX ADMIN — HEPARIN SODIUM 14.6 UNITS/KG/HR: 10000 INJECTION, SOLUTION INTRAVENOUS at 16:31

## 2022-09-17 RX ADMIN — METOPROLOL TARTRATE 50 MG: 50 TABLET, FILM COATED ORAL at 08:38

## 2022-09-17 RX ADMIN — TAZOBACTAM SODIUM AND PIPERACILLIN SODIUM 3.38 G: 375; 3 INJECTION, SOLUTION INTRAVENOUS at 13:00

## 2022-09-17 RX ADMIN — TAZOBACTAM SODIUM AND PIPERACILLIN SODIUM 3.38 G: 375; 3 INJECTION, SOLUTION INTRAVENOUS at 01:35

## 2022-09-17 RX ADMIN — MULTIPLE VITAMINS W/ MINERALS TAB 1 TABLET: TAB at 08:38

## 2022-09-17 RX ADMIN — HYDROCODONE BITARTRATE AND ACETAMINOPHEN 2 TABLET: 10; 325 TABLET ORAL at 13:42

## 2022-09-17 RX ADMIN — HYDROCODONE BITARTRATE AND ACETAMINOPHEN 2 TABLET: 10; 325 TABLET ORAL at 08:39

## 2022-09-18 LAB
APTT PPP: 127.9 SECONDS (ref 22.7–35.4)
APTT PPP: 63.3 SECONDS (ref 22.7–35.4)
APTT PPP: 98.2 SECONDS (ref 22.7–35.4)
BASOPHILS # BLD AUTO: 0.03 10*3/MM3 (ref 0–0.2)
BASOPHILS NFR BLD AUTO: 0.5 % (ref 0–1.5)
DEPRECATED RDW RBC AUTO: 46 FL (ref 37–54)
EOSINOPHIL # BLD AUTO: 0.42 10*3/MM3 (ref 0–0.4)
EOSINOPHIL NFR BLD AUTO: 6.5 % (ref 0.3–6.2)
ERYTHROCYTE [DISTWIDTH] IN BLOOD BY AUTOMATED COUNT: 14.5 % (ref 12.3–15.4)
HCT VFR BLD AUTO: 27.2 % (ref 34–46.6)
HGB BLD-MCNC: 8.9 G/DL (ref 12–15.9)
IMM GRANULOCYTES # BLD AUTO: 0.05 10*3/MM3 (ref 0–0.05)
IMM GRANULOCYTES NFR BLD AUTO: 0.8 % (ref 0–0.5)
INR PPP: 1.31 (ref 0.9–1.1)
LYMPHOCYTES # BLD AUTO: 1.14 10*3/MM3 (ref 0.7–3.1)
LYMPHOCYTES NFR BLD AUTO: 17.5 % (ref 19.6–45.3)
MCH RBC QN AUTO: 28.7 PG (ref 26.6–33)
MCHC RBC AUTO-ENTMCNC: 32.7 G/DL (ref 31.5–35.7)
MCV RBC AUTO: 87.7 FL (ref 79–97)
MONOCYTES # BLD AUTO: 0.61 10*3/MM3 (ref 0.1–0.9)
MONOCYTES NFR BLD AUTO: 9.4 % (ref 5–12)
NEUTROPHILS NFR BLD AUTO: 4.25 10*3/MM3 (ref 1.7–7)
NEUTROPHILS NFR BLD AUTO: 65.3 % (ref 42.7–76)
NRBC BLD AUTO-RTO: 0 /100 WBC (ref 0–0.2)
PLATELET # BLD AUTO: 341 10*3/MM3 (ref 140–450)
PMV BLD AUTO: 10.2 FL (ref 6–12)
PROTHROMBIN TIME: 16.1 SECONDS (ref 11.7–14.2)
RBC # BLD AUTO: 3.1 10*6/MM3 (ref 3.77–5.28)
WBC NRBC COR # BLD: 6.5 10*3/MM3 (ref 3.4–10.8)

## 2022-09-18 PROCEDURE — 99232 SBSQ HOSP IP/OBS MODERATE 35: CPT | Performed by: INTERNAL MEDICINE

## 2022-09-18 PROCEDURE — 85730 THROMBOPLASTIN TIME PARTIAL: CPT | Performed by: ORTHOPAEDIC SURGERY

## 2022-09-18 PROCEDURE — 85610 PROTHROMBIN TIME: CPT | Performed by: HOSPITALIST

## 2022-09-18 PROCEDURE — 25010000002 PIPERACILLIN SOD-TAZOBACTAM PER 1 G: Performed by: INTERNAL MEDICINE

## 2022-09-18 PROCEDURE — 25010000002 HEPARIN (PORCINE) 25000-0.45 UT/250ML-% SOLUTION: Performed by: INTERNAL MEDICINE

## 2022-09-18 PROCEDURE — 85730 THROMBOPLASTIN TIME PARTIAL: CPT | Performed by: INTERNAL MEDICINE

## 2022-09-18 PROCEDURE — 85025 COMPLETE CBC W/AUTO DIFF WBC: CPT | Performed by: INTERNAL MEDICINE

## 2022-09-18 PROCEDURE — 25010000002 HEPARIN (PORCINE) PER 1000 UNITS: Performed by: INTERNAL MEDICINE

## 2022-09-18 RX ADMIN — MULTIPLE VITAMINS W/ MINERALS TAB 1 TABLET: TAB at 09:56

## 2022-09-18 RX ADMIN — METOPROLOL TARTRATE 50 MG: 50 TABLET, FILM COATED ORAL at 09:56

## 2022-09-18 RX ADMIN — HYDRALAZINE HYDROCHLORIDE 25 MG: 25 TABLET, FILM COATED ORAL at 20:46

## 2022-09-18 RX ADMIN — HYDROCODONE BITARTRATE AND ACETAMINOPHEN 2 TABLET: 10; 325 TABLET ORAL at 13:59

## 2022-09-18 RX ADMIN — HEPARIN SODIUM 18.6 UNITS/KG/HR: 10000 INJECTION, SOLUTION INTRAVENOUS at 21:40

## 2022-09-18 RX ADMIN — HYDRALAZINE HYDROCHLORIDE 25 MG: 25 TABLET, FILM COATED ORAL at 09:56

## 2022-09-18 RX ADMIN — HEPARIN SODIUM 16.6 UNITS/KG/HR: 10000 INJECTION, SOLUTION INTRAVENOUS at 07:29

## 2022-09-18 RX ADMIN — HEPARIN SODIUM 4120 UNITS: 5000 INJECTION, SOLUTION INTRAVENOUS; SUBCUTANEOUS at 16:06

## 2022-09-18 RX ADMIN — METOPROLOL TARTRATE 50 MG: 50 TABLET, FILM COATED ORAL at 20:45

## 2022-09-18 RX ADMIN — HYDROCODONE BITARTRATE AND ACETAMINOPHEN 2 TABLET: 10; 325 TABLET ORAL at 21:58

## 2022-09-18 RX ADMIN — TAZOBACTAM SODIUM AND PIPERACILLIN SODIUM 3.38 G: 375; 3 INJECTION, SOLUTION INTRAVENOUS at 15:46

## 2022-09-18 RX ADMIN — HYDROCODONE BITARTRATE AND ACETAMINOPHEN 2 TABLET: 10; 325 TABLET ORAL at 18:15

## 2022-09-18 RX ADMIN — LISINOPRIL: 10 TABLET ORAL at 09:56

## 2022-09-18 RX ADMIN — HYDROCODONE BITARTRATE AND ACETAMINOPHEN 2 TABLET: 10; 325 TABLET ORAL at 09:56

## 2022-09-18 RX ADMIN — Medication 2000 UNITS: at 09:56

## 2022-09-19 LAB
APTT PPP: 64 SECONDS (ref 22.7–35.4)
APTT PPP: 75 SECONDS (ref 22.7–35.4)
BASOPHILS # BLD AUTO: 0.04 10*3/MM3 (ref 0–0.2)
BASOPHILS NFR BLD AUTO: 0.7 % (ref 0–1.5)
DEPRECATED RDW RBC AUTO: 49 FL (ref 37–54)
EOSINOPHIL # BLD AUTO: 0.41 10*3/MM3 (ref 0–0.4)
EOSINOPHIL NFR BLD AUTO: 6.8 % (ref 0.3–6.2)
ERYTHROCYTE [DISTWIDTH] IN BLOOD BY AUTOMATED COUNT: 14.5 % (ref 12.3–15.4)
HCT VFR BLD AUTO: 28.4 % (ref 34–46.6)
HGB BLD-MCNC: 9 G/DL (ref 12–15.9)
IMM GRANULOCYTES # BLD AUTO: 0.04 10*3/MM3 (ref 0–0.05)
IMM GRANULOCYTES NFR BLD AUTO: 0.7 % (ref 0–0.5)
INR PPP: 1.19 (ref 0.9–1.1)
LYMPHOCYTES # BLD AUTO: 1.04 10*3/MM3 (ref 0.7–3.1)
LYMPHOCYTES NFR BLD AUTO: 17.3 % (ref 19.6–45.3)
MCH RBC QN AUTO: 29 PG (ref 26.6–33)
MCHC RBC AUTO-ENTMCNC: 31.7 G/DL (ref 31.5–35.7)
MCV RBC AUTO: 91.6 FL (ref 79–97)
MONOCYTES # BLD AUTO: 0.61 10*3/MM3 (ref 0.1–0.9)
MONOCYTES NFR BLD AUTO: 10.1 % (ref 5–12)
NEUTROPHILS NFR BLD AUTO: 3.88 10*3/MM3 (ref 1.7–7)
NEUTROPHILS NFR BLD AUTO: 64.4 % (ref 42.7–76)
NRBC BLD AUTO-RTO: 0 /100 WBC (ref 0–0.2)
PLATELET # BLD AUTO: 402 10*3/MM3 (ref 140–450)
PMV BLD AUTO: 10.1 FL (ref 6–12)
PROTHROMBIN TIME: 14.9 SECONDS (ref 11.7–14.2)
RBC # BLD AUTO: 3.1 10*6/MM3 (ref 3.77–5.28)
WBC NRBC COR # BLD: 6.02 10*3/MM3 (ref 3.4–10.8)

## 2022-09-19 PROCEDURE — 99231 SBSQ HOSP IP/OBS SF/LOW 25: CPT | Performed by: NURSE PRACTITIONER

## 2022-09-19 PROCEDURE — 25010000002 HEPARIN (PORCINE) PER 1000 UNITS: Performed by: INTERNAL MEDICINE

## 2022-09-19 PROCEDURE — 85025 COMPLETE CBC W/AUTO DIFF WBC: CPT | Performed by: INTERNAL MEDICINE

## 2022-09-19 PROCEDURE — 25010000002 HEPARIN (PORCINE) 25000-0.45 UT/250ML-% SOLUTION: Performed by: INTERNAL MEDICINE

## 2022-09-19 PROCEDURE — 85610 PROTHROMBIN TIME: CPT | Performed by: HOSPITALIST

## 2022-09-19 PROCEDURE — 25010000002 PIPERACILLIN SOD-TAZOBACTAM PER 1 G: Performed by: INTERNAL MEDICINE

## 2022-09-19 PROCEDURE — 85730 THROMBOPLASTIN TIME PARTIAL: CPT | Performed by: ORTHOPAEDIC SURGERY

## 2022-09-19 PROCEDURE — 85730 THROMBOPLASTIN TIME PARTIAL: CPT | Performed by: INTERNAL MEDICINE

## 2022-09-19 PROCEDURE — 99232 SBSQ HOSP IP/OBS MODERATE 35: CPT | Performed by: INTERNAL MEDICINE

## 2022-09-19 RX ADMIN — MULTIPLE VITAMINS W/ MINERALS TAB 1 TABLET: TAB at 09:07

## 2022-09-19 RX ADMIN — HEPARIN SODIUM 17.6 UNITS/KG/HR: 10000 INJECTION, SOLUTION INTRAVENOUS at 13:05

## 2022-09-19 RX ADMIN — TAZOBACTAM SODIUM AND PIPERACILLIN SODIUM 3.38 G: 375; 3 INJECTION, SOLUTION INTRAVENOUS at 11:50

## 2022-09-19 RX ADMIN — HYDROCODONE BITARTRATE AND ACETAMINOPHEN 2 TABLET: 10; 325 TABLET ORAL at 14:12

## 2022-09-19 RX ADMIN — TAZOBACTAM SODIUM AND PIPERACILLIN SODIUM 3.38 G: 375; 3 INJECTION, SOLUTION INTRAVENOUS at 03:06

## 2022-09-19 RX ADMIN — Medication 2000 UNITS: at 09:07

## 2022-09-19 RX ADMIN — HYDROCODONE BITARTRATE AND ACETAMINOPHEN 2 TABLET: 10; 325 TABLET ORAL at 06:13

## 2022-09-19 RX ADMIN — TAZOBACTAM SODIUM AND PIPERACILLIN SODIUM 3.38 G: 375; 3 INJECTION, SOLUTION INTRAVENOUS at 18:20

## 2022-09-19 RX ADMIN — HYDROCODONE BITARTRATE AND ACETAMINOPHEN 2 TABLET: 10; 325 TABLET ORAL at 18:19

## 2022-09-19 RX ADMIN — HYDROCODONE BITARTRATE AND ACETAMINOPHEN 2 TABLET: 10; 325 TABLET ORAL at 10:09

## 2022-09-19 RX ADMIN — HEPARIN SODIUM 4120 UNITS: 5000 INJECTION, SOLUTION INTRAVENOUS; SUBCUTANEOUS at 07:30

## 2022-09-19 RX ADMIN — HYDRALAZINE HYDROCHLORIDE 25 MG: 25 TABLET, FILM COATED ORAL at 09:07

## 2022-09-19 RX ADMIN — LISINOPRIL: 10 TABLET ORAL at 09:06

## 2022-09-19 RX ADMIN — HYDRALAZINE HYDROCHLORIDE 25 MG: 25 TABLET, FILM COATED ORAL at 21:16

## 2022-09-19 RX ADMIN — HYDROCODONE BITARTRATE AND ACETAMINOPHEN 2 TABLET: 10; 325 TABLET ORAL at 22:39

## 2022-09-19 RX ADMIN — METOPROLOL TARTRATE 50 MG: 50 TABLET, FILM COATED ORAL at 09:09

## 2022-09-19 RX ADMIN — METOPROLOL TARTRATE 50 MG: 50 TABLET, FILM COATED ORAL at 21:16

## 2022-09-20 LAB
APTT PPP: 79.4 SECONDS (ref 22.7–35.4)
BASOPHILS # BLD AUTO: 0.03 10*3/MM3 (ref 0–0.2)
BASOPHILS NFR BLD AUTO: 0.5 % (ref 0–1.5)
DEPRECATED RDW RBC AUTO: 46.7 FL (ref 37–54)
EOSINOPHIL # BLD AUTO: 0.44 10*3/MM3 (ref 0–0.4)
EOSINOPHIL NFR BLD AUTO: 7.6 % (ref 0.3–6.2)
ERYTHROCYTE [DISTWIDTH] IN BLOOD BY AUTOMATED COUNT: 14.3 % (ref 12.3–15.4)
HCT VFR BLD AUTO: 27.9 % (ref 34–46.6)
HGB BLD-MCNC: 9.2 G/DL (ref 12–15.9)
IMM GRANULOCYTES # BLD AUTO: 0.07 10*3/MM3 (ref 0–0.05)
IMM GRANULOCYTES NFR BLD AUTO: 1.2 % (ref 0–0.5)
INR PPP: 1.13 (ref 0.9–1.1)
LYMPHOCYTES # BLD AUTO: 1.07 10*3/MM3 (ref 0.7–3.1)
LYMPHOCYTES NFR BLD AUTO: 18.5 % (ref 19.6–45.3)
MCH RBC QN AUTO: 29.1 PG (ref 26.6–33)
MCHC RBC AUTO-ENTMCNC: 33 G/DL (ref 31.5–35.7)
MCV RBC AUTO: 88.3 FL (ref 79–97)
MONOCYTES # BLD AUTO: 0.62 10*3/MM3 (ref 0.1–0.9)
MONOCYTES NFR BLD AUTO: 10.7 % (ref 5–12)
NEUTROPHILS NFR BLD AUTO: 3.54 10*3/MM3 (ref 1.7–7)
NEUTROPHILS NFR BLD AUTO: 61.5 % (ref 42.7–76)
NRBC BLD AUTO-RTO: 0 /100 WBC (ref 0–0.2)
PLATELET # BLD AUTO: 355 10*3/MM3 (ref 140–450)
PMV BLD AUTO: 10 FL (ref 6–12)
PROTHROMBIN TIME: 14.4 SECONDS (ref 11.7–14.2)
RBC # BLD AUTO: 3.16 10*6/MM3 (ref 3.77–5.28)
WBC NRBC COR # BLD: 5.77 10*3/MM3 (ref 3.4–10.8)

## 2022-09-20 PROCEDURE — 25010000002 PIPERACILLIN SOD-TAZOBACTAM PER 1 G: Performed by: INTERNAL MEDICINE

## 2022-09-20 PROCEDURE — 25010000002 HEPARIN (PORCINE) 25000-0.45 UT/250ML-% SOLUTION: Performed by: INTERNAL MEDICINE

## 2022-09-20 PROCEDURE — 85025 COMPLETE CBC W/AUTO DIFF WBC: CPT | Performed by: INTERNAL MEDICINE

## 2022-09-20 PROCEDURE — 85730 THROMBOPLASTIN TIME PARTIAL: CPT | Performed by: INTERNAL MEDICINE

## 2022-09-20 PROCEDURE — 99231 SBSQ HOSP IP/OBS SF/LOW 25: CPT | Performed by: NURSE PRACTITIONER

## 2022-09-20 PROCEDURE — 85610 PROTHROMBIN TIME: CPT | Performed by: HOSPITALIST

## 2022-09-20 RX ADMIN — HYDROCODONE BITARTRATE AND ACETAMINOPHEN 2 TABLET: 10; 325 TABLET ORAL at 22:37

## 2022-09-20 RX ADMIN — MULTIPLE VITAMINS W/ MINERALS TAB 1 TABLET: TAB at 09:07

## 2022-09-20 RX ADMIN — HYDROCODONE BITARTRATE AND ACETAMINOPHEN 2 TABLET: 10; 325 TABLET ORAL at 13:37

## 2022-09-20 RX ADMIN — HYDROCODONE BITARTRATE AND ACETAMINOPHEN 2 TABLET: 10; 325 TABLET ORAL at 02:32

## 2022-09-20 RX ADMIN — Medication 2000 UNITS: at 09:07

## 2022-09-20 RX ADMIN — HEPARIN SODIUM 17.6 UNITS/KG/HR: 10000 INJECTION, SOLUTION INTRAVENOUS at 17:03

## 2022-09-20 RX ADMIN — HYDROCODONE BITARTRATE AND ACETAMINOPHEN 2 TABLET: 10; 325 TABLET ORAL at 09:07

## 2022-09-20 RX ADMIN — HYDRALAZINE HYDROCHLORIDE 25 MG: 25 TABLET, FILM COATED ORAL at 20:01

## 2022-09-20 RX ADMIN — TAZOBACTAM SODIUM AND PIPERACILLIN SODIUM 3.38 G: 375; 3 INJECTION, SOLUTION INTRAVENOUS at 11:25

## 2022-09-20 RX ADMIN — TAZOBACTAM SODIUM AND PIPERACILLIN SODIUM 3.38 G: 375; 3 INJECTION, SOLUTION INTRAVENOUS at 02:33

## 2022-09-20 RX ADMIN — METOPROLOL TARTRATE 50 MG: 50 TABLET, FILM COATED ORAL at 20:01

## 2022-09-20 RX ADMIN — HYDROCODONE BITARTRATE AND ACETAMINOPHEN 2 TABLET: 10; 325 TABLET ORAL at 18:27

## 2022-09-20 RX ADMIN — TAZOBACTAM SODIUM AND PIPERACILLIN SODIUM 3.38 G: 375; 3 INJECTION, SOLUTION INTRAVENOUS at 19:58

## 2022-09-20 RX ADMIN — HEPARIN SODIUM 17.6 UNITS/KG/HR: 10000 INJECTION, SOLUTION INTRAVENOUS at 03:13

## 2022-09-21 LAB
APTT PPP: 72.5 SECONDS (ref 22.7–35.4)
BASOPHILS # BLD AUTO: 0.04 10*3/MM3 (ref 0–0.2)
BASOPHILS NFR BLD AUTO: 0.8 % (ref 0–1.5)
DEPRECATED RDW RBC AUTO: 50 FL (ref 37–54)
EOSINOPHIL # BLD AUTO: 0.4 10*3/MM3 (ref 0–0.4)
EOSINOPHIL NFR BLD AUTO: 7.6 % (ref 0.3–6.2)
ERYTHROCYTE [DISTWIDTH] IN BLOOD BY AUTOMATED COUNT: 14.8 % (ref 12.3–15.4)
HCT VFR BLD AUTO: 28.4 % (ref 34–46.6)
HGB BLD-MCNC: 9 G/DL (ref 12–15.9)
IMM GRANULOCYTES # BLD AUTO: 0.03 10*3/MM3 (ref 0–0.05)
IMM GRANULOCYTES NFR BLD AUTO: 0.6 % (ref 0–0.5)
INR PPP: 1.12 (ref 0.9–1.1)
LYMPHOCYTES # BLD AUTO: 1.12 10*3/MM3 (ref 0.7–3.1)
LYMPHOCYTES NFR BLD AUTO: 21.3 % (ref 19.6–45.3)
MCH RBC QN AUTO: 29.3 PG (ref 26.6–33)
MCHC RBC AUTO-ENTMCNC: 31.7 G/DL (ref 31.5–35.7)
MCV RBC AUTO: 92.5 FL (ref 79–97)
MONOCYTES # BLD AUTO: 0.56 10*3/MM3 (ref 0.1–0.9)
MONOCYTES NFR BLD AUTO: 10.6 % (ref 5–12)
NEUTROPHILS NFR BLD AUTO: 3.12 10*3/MM3 (ref 1.7–7)
NEUTROPHILS NFR BLD AUTO: 59.1 % (ref 42.7–76)
NRBC BLD AUTO-RTO: 0 /100 WBC (ref 0–0.2)
PLATELET # BLD AUTO: 331 10*3/MM3 (ref 140–450)
PMV BLD AUTO: 10 FL (ref 6–12)
PROTHROMBIN TIME: 14.3 SECONDS (ref 11.7–14.2)
RBC # BLD AUTO: 3.07 10*6/MM3 (ref 3.77–5.28)
WBC NRBC COR # BLD: 5.27 10*3/MM3 (ref 3.4–10.8)

## 2022-09-21 PROCEDURE — 85025 COMPLETE CBC W/AUTO DIFF WBC: CPT | Performed by: INTERNAL MEDICINE

## 2022-09-21 PROCEDURE — 85610 PROTHROMBIN TIME: CPT | Performed by: HOSPITALIST

## 2022-09-21 PROCEDURE — 25010000002 HEPARIN (PORCINE) 25000-0.45 UT/250ML-% SOLUTION: Performed by: INTERNAL MEDICINE

## 2022-09-21 PROCEDURE — 25010000002 PIPERACILLIN SOD-TAZOBACTAM PER 1 G: Performed by: INTERNAL MEDICINE

## 2022-09-21 PROCEDURE — 99232 SBSQ HOSP IP/OBS MODERATE 35: CPT | Performed by: INTERNAL MEDICINE

## 2022-09-21 PROCEDURE — 85730 THROMBOPLASTIN TIME PARTIAL: CPT | Performed by: INTERNAL MEDICINE

## 2022-09-21 RX ADMIN — METOPROLOL TARTRATE 50 MG: 50 TABLET, FILM COATED ORAL at 10:39

## 2022-09-21 RX ADMIN — HYDROCODONE BITARTRATE AND ACETAMINOPHEN 2 TABLET: 10; 325 TABLET ORAL at 14:44

## 2022-09-21 RX ADMIN — HYDROCODONE BITARTRATE AND ACETAMINOPHEN 2 TABLET: 10; 325 TABLET ORAL at 06:33

## 2022-09-21 RX ADMIN — METOPROLOL TARTRATE 50 MG: 50 TABLET, FILM COATED ORAL at 21:10

## 2022-09-21 RX ADMIN — HEPARIN SODIUM 17.6 UNITS/KG/HR: 10000 INJECTION, SOLUTION INTRAVENOUS at 07:13

## 2022-09-21 RX ADMIN — MULTIPLE VITAMINS W/ MINERALS TAB 1 TABLET: TAB at 10:39

## 2022-09-21 RX ADMIN — TAZOBACTAM SODIUM AND PIPERACILLIN SODIUM 3.38 G: 375; 3 INJECTION, SOLUTION INTRAVENOUS at 10:39

## 2022-09-21 RX ADMIN — HYDROCODONE BITARTRATE AND ACETAMINOPHEN 2 TABLET: 10; 325 TABLET ORAL at 10:39

## 2022-09-21 RX ADMIN — TAZOBACTAM SODIUM AND PIPERACILLIN SODIUM 3.38 G: 375; 3 INJECTION, SOLUTION INTRAVENOUS at 02:45

## 2022-09-21 RX ADMIN — TAZOBACTAM SODIUM AND PIPERACILLIN SODIUM 3.38 G: 375; 3 INJECTION, SOLUTION INTRAVENOUS at 21:12

## 2022-09-21 RX ADMIN — HYDRALAZINE HYDROCHLORIDE 25 MG: 25 TABLET, FILM COATED ORAL at 10:39

## 2022-09-21 RX ADMIN — HYDROCODONE BITARTRATE AND ACETAMINOPHEN 2 TABLET: 10; 325 TABLET ORAL at 21:11

## 2022-09-21 RX ADMIN — HYDRALAZINE HYDROCHLORIDE 25 MG: 25 TABLET, FILM COATED ORAL at 21:10

## 2022-09-21 RX ADMIN — LISINOPRIL: 10 TABLET ORAL at 10:39

## 2022-09-21 RX ADMIN — Medication 2000 UNITS: at 10:39

## 2022-09-21 RX ADMIN — HYDROCODONE BITARTRATE AND ACETAMINOPHEN 2 TABLET: 10; 325 TABLET ORAL at 02:45

## 2022-09-21 RX ADMIN — HEPARIN SODIUM 17.6 UNITS/KG/HR: 10000 INJECTION, SOLUTION INTRAVENOUS at 21:08

## 2022-09-22 LAB
APTT PPP: 63.6 SECONDS (ref 22.7–35.4)
APTT PPP: 82.1 SECONDS (ref 22.7–35.4)
BACTERIA ISLT: NORMAL
BASOPHILS # BLD AUTO: 0.04 10*3/MM3 (ref 0–0.2)
BASOPHILS NFR BLD AUTO: 0.7 % (ref 0–1.5)
DEPRECATED RDW RBC AUTO: 49.1 FL (ref 37–54)
EOSINOPHIL # BLD AUTO: 0.39 10*3/MM3 (ref 0–0.4)
EOSINOPHIL NFR BLD AUTO: 7.1 % (ref 0.3–6.2)
ERYTHROCYTE [DISTWIDTH] IN BLOOD BY AUTOMATED COUNT: 14.6 % (ref 12.3–15.4)
HCT VFR BLD AUTO: 28.8 % (ref 34–46.6)
HGB BLD-MCNC: 9.1 G/DL (ref 12–15.9)
IMM GRANULOCYTES # BLD AUTO: 0.04 10*3/MM3 (ref 0–0.05)
IMM GRANULOCYTES NFR BLD AUTO: 0.7 % (ref 0–0.5)
INR PPP: 1.12 (ref 0.9–1.1)
LYMPHOCYTES # BLD AUTO: 1.11 10*3/MM3 (ref 0.7–3.1)
LYMPHOCYTES NFR BLD AUTO: 20.2 % (ref 19.6–45.3)
MCH RBC QN AUTO: 28.8 PG (ref 26.6–33)
MCHC RBC AUTO-ENTMCNC: 31.6 G/DL (ref 31.5–35.7)
MCV RBC AUTO: 91.1 FL (ref 79–97)
MONOCYTES # BLD AUTO: 0.51 10*3/MM3 (ref 0.1–0.9)
MONOCYTES NFR BLD AUTO: 9.3 % (ref 5–12)
NEUTROPHILS NFR BLD AUTO: 3.4 10*3/MM3 (ref 1.7–7)
NEUTROPHILS NFR BLD AUTO: 62 % (ref 42.7–76)
NRBC BLD AUTO-RTO: 0 /100 WBC (ref 0–0.2)
PLATELET # BLD AUTO: 322 10*3/MM3 (ref 140–450)
PMV BLD AUTO: 10.3 FL (ref 6–12)
PROTHROMBIN TIME: 14.3 SECONDS (ref 11.7–14.2)
RBC # BLD AUTO: 3.16 10*6/MM3 (ref 3.77–5.28)
WBC NRBC COR # BLD: 5.49 10*3/MM3 (ref 3.4–10.8)

## 2022-09-22 PROCEDURE — 85730 THROMBOPLASTIN TIME PARTIAL: CPT | Performed by: INTERNAL MEDICINE

## 2022-09-22 PROCEDURE — 85025 COMPLETE CBC W/AUTO DIFF WBC: CPT | Performed by: INTERNAL MEDICINE

## 2022-09-22 PROCEDURE — 85730 THROMBOPLASTIN TIME PARTIAL: CPT | Performed by: ORTHOPAEDIC SURGERY

## 2022-09-22 PROCEDURE — 25010000002 PIPERACILLIN SOD-TAZOBACTAM PER 1 G: Performed by: INTERNAL MEDICINE

## 2022-09-22 PROCEDURE — 25010000002 HEPARIN (PORCINE) 25000-0.45 UT/250ML-% SOLUTION: Performed by: INTERNAL MEDICINE

## 2022-09-22 PROCEDURE — 85610 PROTHROMBIN TIME: CPT | Performed by: HOSPITALIST

## 2022-09-22 PROCEDURE — 25010000002 HEPARIN (PORCINE) PER 1000 UNITS: Performed by: INTERNAL MEDICINE

## 2022-09-22 RX ADMIN — HYDROCODONE BITARTRATE AND ACETAMINOPHEN 2 TABLET: 10; 325 TABLET ORAL at 02:47

## 2022-09-22 RX ADMIN — TAZOBACTAM SODIUM AND PIPERACILLIN SODIUM 3.38 G: 375; 3 INJECTION, SOLUTION INTRAVENOUS at 11:11

## 2022-09-22 RX ADMIN — METOPROLOL TARTRATE 50 MG: 50 TABLET, FILM COATED ORAL at 20:17

## 2022-09-22 RX ADMIN — Medication 2000 UNITS: at 08:35

## 2022-09-22 RX ADMIN — HEPARIN SODIUM 4100 UNITS: 5000 INJECTION, SOLUTION INTRAVENOUS; SUBCUTANEOUS at 08:35

## 2022-09-22 RX ADMIN — HYDROCODONE BITARTRATE AND ACETAMINOPHEN 2 TABLET: 10; 325 TABLET ORAL at 11:38

## 2022-09-22 RX ADMIN — METOPROLOL TARTRATE 50 MG: 50 TABLET, FILM COATED ORAL at 08:35

## 2022-09-22 RX ADMIN — HEPARIN SODIUM 19.6 UNITS/KG/HR: 10000 INJECTION, SOLUTION INTRAVENOUS at 11:14

## 2022-09-22 RX ADMIN — HYDROCODONE BITARTRATE AND ACETAMINOPHEN 2 TABLET: 10; 325 TABLET ORAL at 06:43

## 2022-09-22 RX ADMIN — TAZOBACTAM SODIUM AND PIPERACILLIN SODIUM 3.38 G: 375; 3 INJECTION, SOLUTION INTRAVENOUS at 20:18

## 2022-09-22 RX ADMIN — TAZOBACTAM SODIUM AND PIPERACILLIN SODIUM 3.38 G: 375; 3 INJECTION, SOLUTION INTRAVENOUS at 02:47

## 2022-09-22 RX ADMIN — HYDROCODONE BITARTRATE AND ACETAMINOPHEN 2 TABLET: 10; 325 TABLET ORAL at 15:56

## 2022-09-22 RX ADMIN — LISINOPRIL: 10 TABLET ORAL at 08:35

## 2022-09-22 RX ADMIN — HYDRALAZINE HYDROCHLORIDE 25 MG: 25 TABLET, FILM COATED ORAL at 20:17

## 2022-09-22 RX ADMIN — MULTIPLE VITAMINS W/ MINERALS TAB 1 TABLET: TAB at 08:35

## 2022-09-22 RX ADMIN — HYDROCODONE BITARTRATE AND ACETAMINOPHEN 2 TABLET: 10; 325 TABLET ORAL at 20:18

## 2022-09-23 VITALS
HEIGHT: 69 IN | DIASTOLIC BLOOD PRESSURE: 73 MMHG | OXYGEN SATURATION: 95 % | WEIGHT: 226.41 LBS | BODY MASS INDEX: 33.53 KG/M2 | RESPIRATION RATE: 16 BRPM | SYSTOLIC BLOOD PRESSURE: 133 MMHG | HEART RATE: 88 BPM | TEMPERATURE: 97.9 F

## 2022-09-23 LAB
APTT PPP: 115.6 SECONDS (ref 22.7–35.4)
APTT PPP: 92.8 SECONDS (ref 22.7–35.4)
BASOPHILS # BLD AUTO: 0.04 10*3/MM3 (ref 0–0.2)
BASOPHILS NFR BLD AUTO: 0.7 % (ref 0–1.5)
DEPRECATED RDW RBC AUTO: 51.2 FL (ref 37–54)
EOSINOPHIL # BLD AUTO: 0.46 10*3/MM3 (ref 0–0.4)
EOSINOPHIL NFR BLD AUTO: 7.7 % (ref 0.3–6.2)
ERYTHROCYTE [DISTWIDTH] IN BLOOD BY AUTOMATED COUNT: 14.9 % (ref 12.3–15.4)
HCT VFR BLD AUTO: 29.2 % (ref 34–46.6)
HGB BLD-MCNC: 9.4 G/DL (ref 12–15.9)
IMM GRANULOCYTES # BLD AUTO: 0.03 10*3/MM3 (ref 0–0.05)
IMM GRANULOCYTES NFR BLD AUTO: 0.5 % (ref 0–0.5)
INR PPP: 2.24 (ref 0.9–1.1)
LYMPHOCYTES # BLD AUTO: 1.42 10*3/MM3 (ref 0.7–3.1)
LYMPHOCYTES NFR BLD AUTO: 23.9 % (ref 19.6–45.3)
MCH RBC QN AUTO: 29.7 PG (ref 26.6–33)
MCHC RBC AUTO-ENTMCNC: 32.2 G/DL (ref 31.5–35.7)
MCV RBC AUTO: 92.4 FL (ref 79–97)
MONOCYTES # BLD AUTO: 0.66 10*3/MM3 (ref 0.1–0.9)
MONOCYTES NFR BLD AUTO: 11.1 % (ref 5–12)
NEUTROPHILS NFR BLD AUTO: 3.34 10*3/MM3 (ref 1.7–7)
NEUTROPHILS NFR BLD AUTO: 56.1 % (ref 42.7–76)
NRBC BLD AUTO-RTO: 0 /100 WBC (ref 0–0.2)
PLATELET # BLD AUTO: 300 10*3/MM3 (ref 140–450)
PMV BLD AUTO: 10 FL (ref 6–12)
PROTHROMBIN TIME: 24.2 SECONDS (ref 11.7–14.2)
RBC # BLD AUTO: 3.16 10*6/MM3 (ref 3.77–5.28)
WBC NRBC COR # BLD: 5.95 10*3/MM3 (ref 3.4–10.8)

## 2022-09-23 PROCEDURE — 85025 COMPLETE CBC W/AUTO DIFF WBC: CPT | Performed by: INTERNAL MEDICINE

## 2022-09-23 PROCEDURE — 25010000002 HEPARIN (PORCINE) 25000-0.45 UT/250ML-% SOLUTION: Performed by: INTERNAL MEDICINE

## 2022-09-23 PROCEDURE — 85730 THROMBOPLASTIN TIME PARTIAL: CPT | Performed by: INTERNAL MEDICINE

## 2022-09-23 PROCEDURE — 85610 PROTHROMBIN TIME: CPT | Performed by: HOSPITALIST

## 2022-09-23 PROCEDURE — 25010000002 PIPERACILLIN SOD-TAZOBACTAM PER 1 G: Performed by: INTERNAL MEDICINE

## 2022-09-23 RX ORDER — HYDROCODONE BITARTRATE AND ACETAMINOPHEN 10; 325 MG/1; MG/1
2 TABLET ORAL EVERY 4 HOURS PRN
Status: DISCONTINUED | OUTPATIENT
Start: 2022-09-23 | End: 2022-09-24 | Stop reason: HOSPADM

## 2022-09-23 RX ORDER — HYDROCODONE BITARTRATE AND ACETAMINOPHEN 10; 325 MG/1; MG/1
1 TABLET ORAL EVERY 4 HOURS PRN
Status: DISCONTINUED | OUTPATIENT
Start: 2022-09-23 | End: 2022-09-24 | Stop reason: HOSPADM

## 2022-09-23 RX ORDER — HEPARIN SODIUM 5000 [USP'U]/ML
40-80 INJECTION, SOLUTION INTRAVENOUS; SUBCUTANEOUS EVERY 6 HOURS PRN
Start: 2022-09-23

## 2022-09-23 RX ADMIN — TAZOBACTAM SODIUM AND PIPERACILLIN SODIUM 3.38 G: 375; 3 INJECTION, SOLUTION INTRAVENOUS at 02:06

## 2022-09-23 RX ADMIN — HYDROCODONE BITARTRATE AND ACETAMINOPHEN 2 TABLET: 10; 325 TABLET ORAL at 04:12

## 2022-09-23 RX ADMIN — HYDRALAZINE HYDROCHLORIDE 25 MG: 25 TABLET, FILM COATED ORAL at 20:30

## 2022-09-23 RX ADMIN — Medication 2000 UNITS: at 09:03

## 2022-09-23 RX ADMIN — HYDROCODONE BITARTRATE AND ACETAMINOPHEN 2 TABLET: 10; 325 TABLET ORAL at 09:03

## 2022-09-23 RX ADMIN — HEPARIN SODIUM 17.6 UNITS/KG/HR: 10000 INJECTION, SOLUTION INTRAVENOUS at 16:03

## 2022-09-23 RX ADMIN — HYDROCODONE BITARTRATE AND ACETAMINOPHEN 2 TABLET: 10; 325 TABLET ORAL at 00:14

## 2022-09-23 RX ADMIN — HEPARIN SODIUM 17.6 UNITS/KG/HR: 10000 INJECTION, SOLUTION INTRAVENOUS at 02:05

## 2022-09-23 RX ADMIN — HYDROCODONE BITARTRATE AND ACETAMINOPHEN 2 TABLET: 10; 325 TABLET ORAL at 13:19

## 2022-09-23 RX ADMIN — LISINOPRIL: 10 TABLET ORAL at 09:02

## 2022-09-23 RX ADMIN — TAZOBACTAM SODIUM AND PIPERACILLIN SODIUM 3.38 G: 375; 3 INJECTION, SOLUTION INTRAVENOUS at 11:44

## 2022-09-23 RX ADMIN — MULTIPLE VITAMINS W/ MINERALS TAB 1 TABLET: TAB at 09:02

## 2022-09-23 RX ADMIN — METOPROLOL TARTRATE 50 MG: 50 TABLET, FILM COATED ORAL at 20:30

## 2022-09-23 RX ADMIN — HYDROCODONE BITARTRATE AND ACETAMINOPHEN 2 TABLET: 10; 325 TABLET ORAL at 18:36

## 2022-09-23 RX ADMIN — HYDRALAZINE HYDROCHLORIDE 25 MG: 25 TABLET, FILM COATED ORAL at 09:02

## 2022-09-23 RX ADMIN — TAZOBACTAM SODIUM AND PIPERACILLIN SODIUM 3.38 G: 375; 3 INJECTION, SOLUTION INTRAVENOUS at 18:36

## 2022-09-23 RX ADMIN — METOPROLOL TARTRATE 50 MG: 50 TABLET, FILM COATED ORAL at 09:02

## 2022-10-12 ENCOUNTER — TELEPHONE (OUTPATIENT)
Dept: INFECTIOUS DISEASES | Facility: CLINIC | Age: 72
End: 2022-10-12

## 2022-10-12 NOTE — TELEPHONE ENCOUNTER
JULIUS ONLY: I called [facility] to see if patient was still admitted there and was told she had moved to [facility]. I called [facility] and spoke to the nurse, Gallito, and he said the patient was there with them, she had another surgery 10/9/22 and that they would not be able to fax us her labs since we do not have privileges at [facility]. MD's there are following her and he states she is still on IV ABX. I told him to please let patient know to call us if she plans to follow up with Dr. May after discharge. TANNER, RN

## 2022-10-19 ENCOUNTER — TELEPHONE (OUTPATIENT)
Dept: INFECTIOUS DISEASES | Facility: CLINIC | Age: 72
End: 2022-10-19

## 2022-10-19 NOTE — TELEPHONE ENCOUNTER
JULIUS: Phone with nurse, Esteban, at [facility]. Patient is back there awaiting transfer to Rehab. He states she has I & D of leg (knee) wound done by plastic surgeon and her IV ABX are complete. Wound looks good and they are waiting on orders from Plastic Surgeon to transfer patient likely back to [facility].   I told him to call our office for any questions or concerns but otherwise, patient is not planning to follow up with us after discharge. Dina Conteh RN

## (undated) DEVICE — Device

## (undated) DEVICE — GLV SURG SIGNATURE ESSENTIAL PF LTX SZ8.5

## (undated) DEVICE — GLV SURG BIOGEL LTX PF 7

## (undated) DEVICE — PK KN TOTL 40

## (undated) DEVICE — SOL NACL 0.9PCT 1000ML

## (undated) DEVICE — DUAL CUT SAGITTAL BLADE

## (undated) DEVICE — SUT ETHLN 2/0 PSLX 30IN 1697H

## (undated) DEVICE — SUT PDS 2/0 CT1 27IN DYED Z339H

## (undated) DEVICE — TRAP FLD MINIVAC MEGADYNE 100ML

## (undated) DEVICE — STERILE PATIENT PROTECTIVE PAD FOR IMP® KNEE POSITIONERS & COHESIVE WRAP (10 / CASE): Brand: DE MAYO KNEE POSITIONER®

## (undated) DEVICE — TBG PENCL TELESCP MEGADYNE SMOKE EVAC 10FT

## (undated) DEVICE — SUT PDS 1 XLH LP 99IN Z881G

## (undated) DEVICE — SUT ETHIB 2 CV V37 MS/4 30IN MX69G

## (undated) DEVICE — GLV SURG SENSICARE PI MIC PF SZ7 LF STRL

## (undated) DEVICE — SUT ETHLN 2/0 PS 18IN 585H

## (undated) DEVICE — 450 ML BOTTLE OF 0.05% CHLORHEXIDINE GLUCONATE IN 99.95% STERILE WATER FOR IRRIGATION, USP AND APPLICATOR.: Brand: IRRISEPT ANTIMICROBIAL WOUND LAVAGE

## (undated) DEVICE — APPL CHLORAPREP HI/LITE 26ML ORNG

## (undated) DEVICE — NEEDLE, QUINCKE, 20GX3.5": Brand: MEDLINE

## (undated) DEVICE — SUT PDS 1 CT1 36IN Z347H

## (undated) DEVICE — SPNG GZ WOVN 4X4IN 12PLY 10/BX STRL

## (undated) DEVICE — ST TRIAL KN COPAL/EXCHANGE/G SM/MD/LG 1P/U

## (undated) DEVICE — A SINGLE USE BEAD MOLD FOR MOLDING BEADS OUT OF CERAMIC BONE GRAFT SUBSTITUTES. THE BEAD MOLD CONSISTS OF CAVITIES IN THREE DIFFERENT SIZES. THE CAVITIES ARE FILLED WITH CERAMIC BONE GRAFT SUBSTITUTE AND EVENLY DISTRIBUTED ACROSS THE MOLD USING THE SPATULA. THE CERAMIC BONE GRAFT SUBSTITUTE SETS IN THE CAVITIES AND THE CERAMIC BONE GRAFT SUBSTITUTE BEADS ARE THEN REMOVED FROM THE MOLD.: Brand: CERAMENT™ BEAD TRAY

## (undated) DEVICE — STPLR SKIN VISISTAT WD 35CT

## (undated) DEVICE — PAD,ABDOMINAL,8"X10",ST,LF: Brand: MEDLINE

## (undated) DEVICE — PREP SOL POVIDONE/IODINE BT 4OZ

## (undated) DEVICE — ST IRR CYSTO W/SPK 77IN LF

## (undated) DEVICE — SOL ISO/ALC RUB 70PCT 4OZ

## (undated) DEVICE — JACKSON-PRATT 100CC BULB RESERVOIR: Brand: CARDINAL HEALTH

## (undated) DEVICE — COVER,MAYO STAND,STERILE: Brand: MEDLINE

## (undated) DEVICE — GLV SURG PREMIERPRO ORTHO LTX PF SZ8.5 BRN

## (undated) DEVICE — DECANTER BAG 9": Brand: MEDLINE INDUSTRIES, INC.

## (undated) DEVICE — BNDG ELAS ELITE V/CLOSE 6IN 5YD LF STRL

## (undated) DEVICE — NAVIO FLAT MARKERS: Brand: NAVIO

## (undated) DEVICE — 400ML COMPACT EVACUATOR KIT, 1/8" PVC WITH TROCAR: Brand: HEMOVAC® WOUND DRAINAGE SYSTEM

## (undated) DEVICE — THE STERILE LIGHT HANDLE COVER IS USED WITH STERIS SURGICAL LIGHTING AND VISUALIZATION SYSTEMS.

## (undated) DEVICE — INTENDED FOR TISSUE SEPARATION, AND OTHER PROCEDURES THAT REQUIRE A SHARP SURGICAL BLADE TO PUNCTURE OR CUT.: Brand: BARD-PARKER ® CARBON RIB-BACK BLADES